# Patient Record
Sex: FEMALE | Race: WHITE | Employment: UNEMPLOYED | URBAN - METROPOLITAN AREA
[De-identification: names, ages, dates, MRNs, and addresses within clinical notes are randomized per-mention and may not be internally consistent; named-entity substitution may affect disease eponyms.]

---

## 2020-08-14 ENCOUNTER — ED HISTORICAL/CONVERTED ENCOUNTER (OUTPATIENT)
Dept: OTHER | Age: 30
End: 2020-08-14

## 2020-10-09 ENCOUNTER — HOSPITAL ENCOUNTER (INPATIENT)
Age: 30
LOS: 3 days | Discharge: HOME | DRG: 750 | End: 2020-10-13
Attending: EMERGENCY MEDICINE | Admitting: PSYCHIATRY & NEUROLOGY
Payer: MEDICAID

## 2020-10-09 DIAGNOSIS — F23 ACUTE PSYCHOSIS (HCC): Primary | ICD-10-CM

## 2020-10-09 LAB
ALBUMIN SERPL-MCNC: 4.3 G/DL (ref 3.5–5)
ALBUMIN/GLOB SERPL: 1.2 {RATIO} (ref 1.1–2.2)
ALP SERPL-CCNC: 86 U/L (ref 45–117)
ALT SERPL-CCNC: 23 U/L (ref 12–78)
AMPHET UR QL SCN: POSITIVE
ANION GAP SERPL CALC-SCNC: 20 MMOL/L (ref 5–15)
APAP SERPL-MCNC: 4 UG/ML (ref 10–30)
APPEARANCE UR: CLEAR
AST SERPL W P-5'-P-CCNC: 43 U/L (ref 15–37)
BACTERIA URNS QL MICRO: NEGATIVE /HPF
BARBITURATES UR QL SCN: NEGATIVE
BENZODIAZ UR QL: NEGATIVE
BILIRUB SERPL-MCNC: 1 MG/DL (ref 0.2–1)
BILIRUB UR QL: NEGATIVE
BUN SERPL-MCNC: 34 MG/DL (ref 6–20)
BUN/CREAT SERPL: 41 (ref 12–20)
CA-I BLD-MCNC: 9.4 MG/DL (ref 8.5–10.1)
CANNABINOIDS UR QL SCN: NEGATIVE
CHLORIDE SERPL-SCNC: 105 MMOL/L (ref 97–108)
CO2 SERPL-SCNC: 14 MMOL/L (ref 21–32)
COCAINE UR QL SCN: NEGATIVE
COLOR UR: ABNORMAL
CREAT SERPL-MCNC: 0.83 MG/DL (ref 0.55–1.02)
DATE LAST DOSE: ABNORMAL
DATE LAST DOSE: NORMAL
DRUG SCRN COMMENT,DRGCM: ABNORMAL
ETHANOL SERPL-MCNC: <10 MG/DL
GLOBULIN SER CALC-MCNC: 3.5 G/DL (ref 2–4)
GLUCOSE SERPL-MCNC: 88 MG/DL (ref 65–100)
GLUCOSE UR STRIP.AUTO-MCNC: NEGATIVE MG/DL
HCG SERPL QL: NEGATIVE
HGB UR QL STRIP: NEGATIVE
KETONES UR QL STRIP.AUTO: 80 MG/DL
LEUKOCYTE ESTERASE UR QL STRIP.AUTO: NEGATIVE
METHADONE UR QL: NEGATIVE
MUCOUS THREADS URNS QL MICRO: ABNORMAL /LPF
NITRITE UR QL STRIP.AUTO: NEGATIVE
OPIATES UR QL: NEGATIVE
PCP UR QL: NEGATIVE
PH UR STRIP: 5 [PH] (ref 5–8)
POTASSIUM SERPL-SCNC: 3.4 MMOL/L (ref 3.5–5.1)
PROT SERPL-MCNC: 7.8 G/DL (ref 6.4–8.2)
PROT UR STRIP-MCNC: 100 MG/DL
RBC #/AREA URNS HPF: ABNORMAL /HPF (ref 0–5)
REPORTED DOSE,DOSE: ABNORMAL UNITS
REPORTED DOSE,DOSE: NORMAL UNITS
SALICYLATES SERPL-MCNC: 3.7 MG/DL (ref 2.8–20)
SARS-COV-2, COV2: NORMAL
SARS-COV-2, COV2: NOT DETECTED
SODIUM SERPL-SCNC: 139 MMOL/L (ref 136–145)
SP GR UR REFRACTOMETRY: 1.02 (ref 1–1.03)
UA: UC IF INDICATED,UAUC: ABNORMAL
UROBILINOGEN UR QL STRIP.AUTO: 0.1 EU/DL (ref 0.1–1)
WBC URNS QL MICRO: ABNORMAL /HPF (ref 0–4)

## 2020-10-09 PROCEDURE — 81001 URINALYSIS AUTO W/SCOPE: CPT

## 2020-10-09 PROCEDURE — 80053 COMPREHEN METABOLIC PANEL: CPT

## 2020-10-09 PROCEDURE — 96372 THER/PROPH/DIAG INJ SC/IM: CPT

## 2020-10-09 PROCEDURE — 84703 CHORIONIC GONADOTROPIN ASSAY: CPT

## 2020-10-09 PROCEDURE — 74011250636 HC RX REV CODE- 250/636: Performed by: EMERGENCY MEDICINE

## 2020-10-09 PROCEDURE — 87635 SARS-COV-2 COVID-19 AMP PRB: CPT

## 2020-10-09 PROCEDURE — 80307 DRUG TEST PRSMV CHEM ANLYZR: CPT

## 2020-10-09 PROCEDURE — 99285 EMERGENCY DEPT VISIT HI MDM: CPT

## 2020-10-09 RX ORDER — BUPRENORPHINE HYDROCHLORIDE, NALOXONE HYDROCHLORIDE 8; 2 MG/1; MG/1
1 FILM, SOLUBLE BUCCAL; SUBLINGUAL DAILY
Status: ON HOLD | COMMUNITY
Start: 2020-10-04 | End: 2020-10-10

## 2020-10-09 RX ORDER — ALPRAZOLAM 1 MG/1
TABLET ORAL
Status: ON HOLD | COMMUNITY
Start: 2020-09-23 | End: 2020-10-10

## 2020-10-09 RX ORDER — ZIPRASIDONE MESYLATE 20 MG/ML
20 INJECTION, POWDER, LYOPHILIZED, FOR SOLUTION INTRAMUSCULAR
Status: COMPLETED | OUTPATIENT
Start: 2020-10-09 | End: 2020-10-09

## 2020-10-09 RX ADMIN — ZIPRASIDONE MESYLATE 20 MG: 20 INJECTION, POWDER, LYOPHILIZED, FOR SOLUTION INTRAMUSCULAR at 20:45

## 2020-10-10 PROBLEM — F25.9 SCHIZOAFFECTIVE DISORDER (HCC): Status: ACTIVE | Noted: 2020-10-10

## 2020-10-10 PROBLEM — F23 ACUTE PSYCHOSIS (HCC): Status: ACTIVE | Noted: 2020-10-10

## 2020-10-10 PROCEDURE — 74011250637 HC RX REV CODE- 250/637: Performed by: PSYCHIATRY & NEUROLOGY

## 2020-10-10 PROCEDURE — 65220000003 HC RM SEMIPRIVATE PSYCH

## 2020-10-10 RX ORDER — OLANZAPINE 5 MG/1
5 TABLET ORAL
Status: DISCONTINUED | OUTPATIENT
Start: 2020-10-10 | End: 2020-10-13 | Stop reason: HOSPADM

## 2020-10-10 RX ORDER — TRAZODONE HYDROCHLORIDE 50 MG/1
50 TABLET ORAL
Status: DISCONTINUED | OUTPATIENT
Start: 2020-10-10 | End: 2020-10-13 | Stop reason: HOSPADM

## 2020-10-10 RX ORDER — DIPHENHYDRAMINE HYDROCHLORIDE 50 MG/ML
50 INJECTION, SOLUTION INTRAMUSCULAR; INTRAVENOUS
Status: DISCONTINUED | OUTPATIENT
Start: 2020-10-10 | End: 2020-10-13 | Stop reason: HOSPADM

## 2020-10-10 RX ORDER — ACETAMINOPHEN 325 MG/1
650 TABLET ORAL
Status: DISCONTINUED | OUTPATIENT
Start: 2020-10-10 | End: 2020-10-13 | Stop reason: HOSPADM

## 2020-10-10 RX ORDER — LORAZEPAM 2 MG/ML
1 INJECTION INTRAMUSCULAR
Status: DISCONTINUED | OUTPATIENT
Start: 2020-10-10 | End: 2020-10-13 | Stop reason: HOSPADM

## 2020-10-10 RX ORDER — RISPERIDONE 1 MG/1
1 TABLET, FILM COATED ORAL 2 TIMES DAILY
Status: DISCONTINUED | OUTPATIENT
Start: 2020-10-10 | End: 2020-10-11

## 2020-10-10 RX ORDER — HALOPERIDOL 5 MG/ML
5 INJECTION INTRAMUSCULAR
Status: DISCONTINUED | OUTPATIENT
Start: 2020-10-10 | End: 2020-10-13 | Stop reason: HOSPADM

## 2020-10-10 RX ORDER — ADHESIVE BANDAGE
30 BANDAGE TOPICAL DAILY PRN
Status: DISCONTINUED | OUTPATIENT
Start: 2020-10-10 | End: 2020-10-13 | Stop reason: HOSPADM

## 2020-10-10 RX ORDER — BENZTROPINE MESYLATE 1 MG/1
1 TABLET ORAL
Status: DISCONTINUED | OUTPATIENT
Start: 2020-10-10 | End: 2020-10-13 | Stop reason: HOSPADM

## 2020-10-10 RX ADMIN — RISPERIDONE 1 MG: 1 TABLET ORAL at 22:22

## 2020-10-10 RX ADMIN — RISPERIDONE 1 MG: 1 TABLET ORAL at 17:14

## 2020-10-10 NOTE — ED PROVIDER NOTES
EMERGENCY DEPARTMENT HISTORY AND PHYSICAL EXAM      Date: 10/9/2020  Patient Name: Joni Sen    History of Presenting Illness     Chief Complaint   Patient presents with    Mental Health Problem       History Provided By: EMS and Law Enforcement    HPI: Joni Sen, 26 y.o. female with a past medical history significant Prior psychiatric history presents to the ED with cc of patient was brought in by EMS after police were alerted that Pt found by Theresa SOTO/ digging through trash after reports of neighbors call for Pt digging through neighbors trash. PD report Pt stated was given handful of unknown substance/pills prior to episode. PD report Pt has Hx of BH issues    There are no other complaints, changes, or physical findings at this time. PCP: None    Current Outpatient Medications   Medication Sig Dispense Refill    ALPRAZolam (XANAX) 1 mg tablet       Suboxone 8-2 mg film sublingaul film 1 Film by SubLINGual route daily. 1/2 film PRN for cravings         Past History     Past Medical History:  No past medical history on file. Past Surgical History:  No past surgical history on file. Family History:  No family history on file. Social History:  Social History     Tobacco Use    Smoking status: Unknown If Ever Smoked   Substance Use Topics    Alcohol use: Not on file    Drug use: Not on file       Allergies:  No Known Allergies      Review of Systems     Review of Systems   Unable to perform ROS: Psychiatric disorder   Psychiatric/Behavioral: Positive for agitation, behavioral problems and confusion. The patient is nervous/anxious. Physical Exam     Physical Exam  Constitutional:       General: She is in acute distress. Appearance: She is toxic-appearing. She is not ill-appearing. HENT:      Head: Normocephalic and atraumatic.       Nose: Nose normal.      Mouth/Throat:      Mouth: Mucous membranes are moist.   Eyes:      Extraocular Movements: Extraocular movements intact. Conjunctiva/sclera: Conjunctivae normal.      Pupils: Pupils are equal, round, and reactive to light. Neck:      Musculoskeletal: Neck supple. Cardiovascular:      Rate and Rhythm: Normal rate and regular rhythm. Pulses: Normal pulses. Pulmonary:      Effort: Pulmonary effort is normal.      Breath sounds: Normal breath sounds. Abdominal:      General: Abdomen is flat. Palpations: Abdomen is soft. Tenderness: There is no abdominal tenderness. Musculoskeletal: Normal range of motion. General: No swelling or deformity. Skin:     General: Skin is warm and dry. Neurological:      General: No focal deficit present. Mental Status: She is oriented to person, place, and time.    Psychiatric:      Comments: Is extremely combative and agitated         Diagnostic Study Results     Labs -     Recent Results (from the past 12 hour(s))   URINALYSIS W/ REFLEX CULTURE    Collection Time: 10/09/20  9:15 PM    Specimen: Urine   Result Value Ref Range    Color Yellow/Straw      Appearance Clear Clear      Specific gravity 1.025 1.003 - 1.030      pH (UA) 5.0 5.0 - 8.0      Protein 100 (A) Negative mg/dL    Glucose Negative Negative mg/dL    Ketone 80 (A) Negative mg/dL    Bilirubin Negative Negative      Blood Negative Negative      Urobilinogen 0.1 0.1 - 1.0 EU/dL    Nitrites Negative Negative      Leukocyte Esterase Negative Negative      UA:UC IF INDICATED Culture not indicated by UA result Culture not indicated by UA result      WBC 5-10 0 - 4 /hpf    RBC 0-5 0 - 5 /hpf    Bacteria Negative Negative /hpf    Mucus Trace /lpf   HCG QL SERUM    Collection Time: 10/09/20  9:15 PM   Result Value Ref Range    HCG, Ql. Negative Negative     METABOLIC PANEL, COMPREHENSIVE    Collection Time: 10/09/20  9:15 PM   Result Value Ref Range    Sodium 139 136 - 145 mmol/L    Potassium 3.4 (L) 3.5 - 5.1 mmol/L    Chloride 105 97 - 108 mmol/L    CO2 14 (LL) 21 - 32 mmol/L    Anion gap 20 (H) 5 - 15 mmol/L    Glucose 88 65 - 100 mg/dL    BUN 34 (H) 6 - 20 mg/dL    Creatinine 0.83 0.55 - 1.02 mg/dL    BUN/Creatinine ratio 41 (H) 12 - 20      GFR est AA >60 >60 ml/min/1.73m2    GFR est non-AA >60 >60 ml/min/1.73m2    Calcium 9.4 8.5 - 10.1 mg/dL    Bilirubin, total 1.0 0.2 - 1.0 mg/dL    AST (SGOT) 43 (H) 15 - 37 U/L    ALT (SGPT) 23 12 - 78 U/L    Alk. phosphatase 86 45 - 117 U/L    Protein, total 7.8 6.4 - 8.2 g/dL    Albumin 4.3 3.5 - 5.0 g/dL    Globulin 3.5 2.0 - 4.0 g/dL    A-G Ratio 1.2 1.1 - 2.2     DRUG SCREEN, URINE    Collection Time: 10/09/20  9:15 PM   Result Value Ref Range    AMPHETAMINES Positive (A) Negative      BARBITURATES Negative Negative      BENZODIAZEPINES Negative Negative      COCAINE Negative Negative      METHADONE Negative Negative      OPIATES Negative Negative      PCP(PHENCYCLIDINE) Negative Negative      THC (TH-CANNABINOL) Negative Negative      Drug screen comment        This test is a screen for drugs of abuse in a medical setting only (i.e., they are unconfirmed results and as such must not be used for non-medical purposes, e.g.,employment testing, legal testing). Due to its inherent nature, false positive (FP) and false negative (FN) results may be obtained. Therefore, if necessary for medical care, recommend confirmation of positive findings by GC/MS.    ACETAMINOPHEN    Collection Time: 10/09/20  9:15 PM   Result Value Ref Range    Acetaminophen level 4 (L) 10 - 30 ug/mL    Reported dose date Not provided      Reported dose: Not provided Units   SALICYLATE    Collection Time: 10/09/20  9:15 PM   Result Value Ref Range    Salicylate level 3.7 2.8 - 20.0 mg/dL    Reported dose date Not provided      Reported dose: Not provided Units   SARS-COV-2    Collection Time: 10/09/20  9:15 PM   Result Value Ref Range    SARS-CoV-2 Please find results under separate order     ETHYL ALCOHOL    Collection Time: 10/09/20  9:15 PM   Result Value Ref Range    ALCOHOL(ETHYL),SERUM <10 <10 mg/dL   SARS-COV-2    Collection Time: 10/09/20  9:15 PM   Result Value Ref Range    SARS-CoV-2 Not Detected Not Detected         Radiologic Studies -   XR Results (most recent):  No results found for this or any previous visit. CT Results  (Last 48 hours)    None        CXR Results  (Last 48 hours)    None          Medical Decision Making and ED Course   I am the first provider for this patient. Provider Notes:   Patient presents with acute suicidal ideation. DDx:  2/2 MDD, schizoaffective d/o, bipolar, drug induced, organic cause such as electrolyte anomoly or infection. Stable vitals and benign exam. No obvious organic causes to explain behavior but will obtain psych labs, UA, UDS and speak with mental health professional about possible admission. Pt is currently voluntary. Sitter at bedside. Will continue to monitor while in ED. I reviewed the vital signs, available nursing notes, past medical history, past surgical history, family history and social history. Vital Signs-Reviewed the patient's vital signs. Patient Vitals for the past 12 hrs:   Temp Pulse Resp BP SpO2   10/10/20 0604 98 °F (36.7 °C) 89 16 122/73    10/09/20 2050 98.8 °F (37.1 °C) (!) 134 24 (!) 142/80 99 %         Records Reviewed: Nursing Notes and Ambulance Run Sheet    ED Course:   Initial assessment performed. The patients presenting problems have been discussed, and they are in agreement with the care plan formulated and outlined with them. I have encouraged them to ask questions as they arise throughout their visit. Disposition     Admit to psychiatry      Diagnosis     Clinical Impression:   Encounter Diagnoses     ICD-10-CM ICD-9-CM   1. Acute psychosis [facility])  F23 298.9        Attestations:    Wolf Mon MD    Please note that this dictation was completed with [employer], the [employer] voice recognition software.   Quite often unanticipated grammatical, syntax, homophones, and other interpretive errors are inadvertently transcribed by the computer software. Please disregard these errors. Please excuse any errors that have escaped final proofreading. Thank you.

## 2020-10-10 NOTE — SUICIDE SAFETY PLAN
SAFETY PLAN    A suicide Safety Plan is a document that supports someone when they are having thoughts of suicide. Warning Signs that indicate a suicidal crisis may be developing: What (situations, thoughts, feelings, body sensations, behaviors, etc.) do you experience that lets you know you are beginning to think about suicide? 1.refused to answer        Internal Coping Strategies:  What things can I do (relaxation techniques, hobbies, physical activities, etc.) to take my mind off my problems without contacting another person? 1. Refused to answer        People and social settings that provide distraction: Who can I call or where can I go to distract me? 1. Name: refused to answer Phone: n/a    People whom I can ask for help: Who can I call when I need help - for example, friends, family, clergy, someone else? 1. Name: refused to answer  Phone: n/a    Professionals or [address] [place] [address] agencies I can contact during a crisis: Who can I call for help - for example, my doctor, my psychiatrist, my psychologist, a mental health provider, a suicide hotline? 1. Clinician Name:n/a  Phone: n/a      Clinician Pager or Emergency Contact  n/a  2. Suicide Prevention Lifeline: 3-100-947-TALK (7240)    4. [address] [place] [address] Emergency Services -  for example, [facility] suicide hotline, [address] [place] [address] Avenue: [phone]      Emergency Services Address: [address] [place] [address]      Emergency Services Phone: n/a    Making the environment safe: How can I make my environment (house/apartment/living space) safer? For example, can I remove guns, medications, and other items? 1.  Refused to answer

## 2020-10-10 NOTE — BSMART NOTE
Pt assessed face to face in ED Trauma 2. Pt is 26year old white female presenting to ED under paperless ECO with complaints of unspecified psychosis. Pt presents laying in hospital bed in green gown, filthy appearance, poor smell, unkempt and disheveled, intense eye contact, drowsy, alert only to self but not time place or situation, mumbled speech. Pt presents with labile affect, anxious mood, illogical thought process, impaired thought content, no insight/judgement. Pt unable to participate in assessment due to current mental state. Pt mumbling, word salad, unable to understand. Per [place] PD Juan J Christine who picked up pt, apparently PD called due to pt digging through her neighbor's trash can. When PD arrived on scene at pt's house, pt was on the porch yelling and screaming for everyone to leave her property. Pd reports that it was only pt and her father at the house. Pt also asked PD to speak with her 2 brothers who pt stated were in her bedroom. When PD went to speak to them, nobody there, per father, bother her brothers are away at [employer] for school. Per PD, apparently pt has psych hx but were unable to give further information. Upon arrival to ED, pt uncooperative, yelling and screaming, combative so was put into 2 point restraints and given Geodon. Pt unable to have coherent conversation with writer. Pt assessed by Natalie Lancaster from [place] [address] who reports pt is to be a TDO. Dr. Dmitriy Vail notified, reports that pt will be accepted to  pending medical clearance and negative COVID test.  ED RN Pat Joseph and Dr. Martinez Been notified.

## 2020-10-10 NOTE — GROUP NOTE
PIY[facility]  GROUP DOCUMENTATION INDIVIDUAL Group Therapy Note Date: 10/10/2020 Group Start Time: 1300 Group End Time: 7994 Group Topic: Process Group - Inpatient [facility] Aj Small IP  GROUP DOCUMENTATION GROUP Group Therapy Note The therapist facilitated a group by allowing the patient's to share their thoughts and feelings Attendees:  
 
  
 
Attendance: The nursing staff asked that the therapist not invite the patient to group. She had just arrived on the unit, and was very agitated while in the ED. She had been sleeping when group occurred. Patient's Goal: Interventions/techniques: Follows Directions:  
 
Interactions:  
 
Mental Status:  
 
Behavior/appearance:  
 
Goals Achieved: Additional Notes:   
 
Karthik Harper

## 2020-10-10 NOTE — PROGRESS NOTES
Problem: Psychosis  Goal: *STG: Decreased hallucinations  Outcome: Progressing Towards Goal     Problem: Psychosis  Goal: *STG: Remains safe in hospital  Outcome: Progressing Towards Goal      Problem: Psychosis  Goal: *STG/LTG: Demonstrates improved thought patterns as evidenced by logical and coherent speech  Outcome: Progressing Towards Goal   Pt has spent most of shift in bed resting with eyes closed, respirations even and unlabored. Pt continues to denied a/v hallucinations, without noted behaviors. Pt denied SI/HI. Pt without aggressive behaviors. Pt has accepted and tolerated meds in her room, she did not want to go into the dining room with peers today. Pt received several calls from family members who did not have her passcode. Pt informed of calls and advised her to contact them with her passcode to accept calls. Pt was provided with written numbers for family members, when handed to her she threw the paper on the bed. Pt remains unkempt and disheveled, again encouraged to completed hygienic needs as personal hygiene items have been provided to pt. Will continue to monitor pt on close observation and follow treatment plan. Pt has several black and brown bruises over her hips, legs and arms, pt stated she did not know how she got them. No open wounds.

## 2020-10-10 NOTE — ROUTINE PROCESS
TRANSFER - OUT REPORT: 
 
Verbal report given to Ranjana Mendez on Quan Sam  being transferred to  for routine progression of care Report consisted of patients Situation, Background, Assessment and  
Recommendations(SBAR). Information from the following report(s) ED Summary was reviewed with the receiving nurse. Lines:    
 
Opportunity for questions and clarification was provided. Patient trans ported with:[place] [employer] Tech

## 2020-10-10 NOTE — H&P
[facility] HISTORY AND PHYSICAL    Name:  Azra Macias  MR#:  [id number]  :  1990  ACCOUNT #:  [de-identified]  ADMIT DATE:  10/09/2020    INITIAL PSYCHIATRIC ASSESSMENT    HISTORY OF PRESENT ILLNESS:  The patient is a 79-year-old  female, who was admitted to the behavioral health floor under TDO after the patient presented with a paperless ECO. The patient presented with psychosis unspecified at the time of initial presentation to the ED. The patient is a poor historian and information is thus obtained from talking with behavioral health intake staff, review of electronic records, and talking to the patient as well. As per the information available, the patient presented initially to the emergency department unkempt, disheveled, oriented to self, disorganized thought process, anxious, labile with poor insight and judgment. The patient continues to stare, unable to provide information or express her feelings. The patient noted mumbling and difficult to understand. As per the report available and per [place] [employer], who picked the patient, they had noted that the patient was digging through her neighbor's trash can. When police had arrived at the scene, the patient was reported on the porch, yelling and screaming for everyone to leave her property. The patient also had wanted the police officers to talk to her brothers and as per the information from her father, both her brothers are at [employer] at school. The patient further had difficulty providing further information. The patient in the emergency department on arrival was noted to be yelling, screaming, actively responding, combative, had to be placed on two-point restraints and was given Geodon. The patient TDO'ed to the behavioral health floor. PAST PSYCHIATRIC HISTORY:  Information is unavailable at this time. Unable to obtain.     Trauma history, family, medical and personal history unobtainable at this time. MENTAL STATUS EXAMINATION:  The patient is alert, oriented to name. Unable to assess her orientation to place, time. Actively responding to external and internal stimuli. Disorganized, disheveled. Poor insight, judgment. Unable to express herself, loose associations. ADMITTING DIAGNOSES:  1.  Schizoaffective disorder. 2.  Psychosis, unspecified. 3.  Rule out substance-induced mood disorder. Positive for amphetamines. The patient to be placed on close observation. We will start her on Risperdal 1 mg p.o. b.i.d. and place her on p.r.n. medications as needed for acute psychosis, anxiety, agitation as per the physician orders. The patient will continue to engage in individual therapy, group therapy, and support group as she continues to progress. We will continue to obtain further collateral.    We will engage in family meetings to continue to address her treatment planning and discharge planning. Place on close observation. We will obtain previous treatment records. LENGTH OF STAY:  Five to seven days. STRENGTHS:  Seems overall healthy and has support from father. WEAKNESS:  Poor insight. DISCHARGE CRITERIA:  The patient shows improvement in her psychotic symptoms and continues to show improvement and gain insight into her current illness and situations leading to her current situation and has healthy ways to cope and shows improved insight. Medical consult to be obtained.       Leah Becerra MD      [id number]  D:  10/10/2020 14:50  T:  10/10/2020 17:25  JOB #:  [id number]

## 2020-10-10 NOTE — BSMART NOTE
Request from [facility] Hiro Ruiz to call [facility] at [phone] to inform them if pt is COVID positive as Hiro Ruiz was dealing with pt without a mask on when he first made contact.

## 2020-10-10 NOTE — BH NOTES
Writer spoke with pt's sister Geoff Sinclair ([phone]) who reported that her sister has had one prior TDO and that she is currently in a suboxone program and she wanted to relay the information to the on-call psychiatrist. Tamara Larkin informed Dr. Hernandez Lennon.

## 2020-10-10 NOTE — ED TRIAGE NOTES
Pt found by Theresa SOTO/ digging through trash after reports of neighbors call for Pt digging through neighbors trash. PD report Pt stated was given handful of unknown substance/pills prior to episode. PD report Pt has Hx of BH response.

## 2020-10-11 PROCEDURE — 65220000003 HC RM SEMIPRIVATE PSYCH

## 2020-10-11 PROCEDURE — 74011250637 HC RX REV CODE- 250/637: Performed by: EMERGENCY MEDICINE

## 2020-10-11 PROCEDURE — 74011250637 HC RX REV CODE- 250/637: Performed by: PSYCHIATRY & NEUROLOGY

## 2020-10-11 RX ORDER — HYDROXYZINE PAMOATE 50 MG/1
50 CAPSULE ORAL
Status: DISCONTINUED | OUTPATIENT
Start: 2020-10-11 | End: 2020-10-13 | Stop reason: HOSPADM

## 2020-10-11 RX ORDER — CITALOPRAM 10 MG/1
10 TABLET ORAL DAILY
Status: DISCONTINUED | OUTPATIENT
Start: 2020-10-11 | End: 2020-10-13 | Stop reason: HOSPADM

## 2020-10-11 RX ORDER — IBUPROFEN 200 MG
1 TABLET ORAL DAILY
Status: DISCONTINUED | OUTPATIENT
Start: 2020-10-11 | End: 2020-10-13 | Stop reason: HOSPADM

## 2020-10-11 RX ORDER — RISPERIDONE 2 MG/1
2 TABLET, FILM COATED ORAL 2 TIMES DAILY
Status: DISCONTINUED | OUTPATIENT
Start: 2020-10-11 | End: 2020-10-13 | Stop reason: HOSPADM

## 2020-10-11 RX ADMIN — HYDROXYZINE PAMOATE 50 MG: 50 CAPSULE ORAL at 21:00

## 2020-10-11 RX ADMIN — RISPERIDONE 1 MG: 1 TABLET ORAL at 08:09

## 2020-10-11 RX ADMIN — RISPERIDONE 2 MG: 2 TABLET ORAL at 20:59

## 2020-10-11 RX ADMIN — CITALOPRAM HYDROBROMIDE 10 MG: 10 TABLET ORAL at 11:07

## 2020-10-11 RX ADMIN — TRAZODONE HYDROCHLORIDE 50 MG: 50 TABLET ORAL at 21:00

## 2020-10-11 RX ADMIN — OLANZAPINE 5 MG: 5 TABLET, FILM COATED ORAL at 15:23

## 2020-10-11 RX ADMIN — ACETAMINOPHEN 650 MG: 325 TABLET, FILM COATED ORAL at 11:24

## 2020-10-11 RX ADMIN — HYDROXYZINE PAMOATE 50 MG: 50 CAPSULE ORAL at 11:31

## 2020-10-11 NOTE — GROUP NOTE
IP  GROUP DOCUMENTATION INDIVIDUAL Group Therapy Note Date: 10/11/2020 Group Start Time: 2390 Group End Time: 1000 Group Topic: [employer] [facility] 2 BH NON ACUTE Екатерина Bepatricke [facility] GROUP DOCUMENTATION GROUP Group Therapy Note Attendees: 
 
  
 
Attendance:  
 
Patient's Goal: Interventions/techniques: Follows Directions:  
 
Interactions:  
 
Mental Status:  
 
Behavior/appearance:  
 
Goals Achieved: Additional Notes:  
 
Jania Camilo

## 2020-10-11 NOTE — BH NOTES
RELEASE OF INFORMATION     The patient gave the therapist consent to speak with her father Oc Dial ([phone]).

## 2020-10-11 NOTE — BH NOTES
Pt is laying on bed with eyes closed. Pt slept for the entire shift. Slept thru the shift. Woke up this morning and was calm and went back to bed. No outburst behavior noted. Pt is psychotic but is not aggressive at this moment. No violent behaviors noted. Mood is angry. Affect hostile. Insight and judgement poor. Pt thought process is disorganized. Vitals stable.  Pt continue to be on close observation via 15 mins safety checks

## 2020-10-11 NOTE — CONSULTS
Consult    Subjective:     Patient is a 26y.o. year old female no past medical history except dependence on amphetamines came to emergency room and admitted to psychiatric floor because of psychosis anxiety depression aggression    Patient denies any chest pain or shortness of breath nausea vomiting diarrhea no constipation    Past Medical History:   Diagnosis Date    Aggressive outburst     Anxiety disorder       History reviewed. No pertinent surgical history. History reviewed. No pertinent family history.    Social History     Tobacco Use    Smoking status: Former Smoker     Types: Cigarettes    Smokeless tobacco: Never Used    Tobacco comment: pt refused   Substance Use Topics    Alcohol use: Not Currently       Current Facility-Administered Medications   Medication Dose Route Frequency Provider Last Rate Last Dose    risperiDONE (RisperDAL) tablet 2 mg  2 mg Oral BID Erika Cain MD        citalopram (CELEXA) tablet 10 mg  10 mg Oral DAILY Flores Cain MD   10 mg at 10/11/20 1107    nicotine (NICODERM CQ) 21 mg/24 hr patch 1 Patch  1 Patch TransDERmal DAILY Erika Cain MD   1 Patch at 10/11/20 1107    hydrOXYzine pamoate (VISTARIL) capsule 50 mg  50 mg Oral TID PRN María Gomes MD   50 mg at 10/11/20 1131    acetaminophen (TYLENOL) tablet 650 mg  650 mg Oral Q6H PRN Michelle Reed MD   650 mg at 10/11/20 1124    OLANZapine (ZyPREXA) tablet 5 mg  5 mg Oral Q6H PRN María Gomes MD   5 mg at 10/11/20 1523    haloperidol lactate (HALDOL) injection 5 mg  5 mg IntraMUSCular Q6H PRN María Gomes MD        benztropine (COGENTIN) tablet 1 mg  1 mg Oral BID PRN María Gomes MD        diphenhydrAMINE (BENADRYL) injection 50 mg  50 mg IntraMUSCular BID PRN María Gomes MD        LORazepam (ATIVAN) injection 1 mg  1 mg IntraMUSCular Q4H PRN María Gomes MD        traZODone (DESYREL) tablet 50 mg  50 mg Oral QHS PRN María Gomes MD        magnesium hydroxide (MILK OF MAGNESIA) 400 mg/5 mL oral suspension 30 mL  30 mL Oral DAILY PRN Angelica Marlow MD            No Known Allergies     Review of Systems:  Constitutional: Negative for chills and fever. HENT: Negative. Eyes: Negative. Respiratory: Negative. Cardiovascular: Negative. Gastrointestinal: Negative for abdominal pain and nausea. Skin: Negative. Neurological: Negative. Objective: Intake and Output:    No intake/output data recorded. No intake/output data recorded. Physical Exam:   Constitutional: pt is oriented to person, place, and time. HENT:   Head: Normocephalic and atraumatic. Eyes: Pupils are equal, round, and reactive to light. EOM are normal.   Cardiovascular: Normal rate, regular rhythm and normal heart sounds. Pulmonary/Chest: Breath sounds normal. No wheezes. No rales. Exhibits no tenderness. Abdominal: Soft. Bowel sounds are normal. There is no abdominal tenderness. There is no rebound and no guarding. Musculoskeletal: Normal range of motion. Neurological: pt is alert and oriented to person, place, and time. Alert. Normal strength. No cranial nerve deficit or sensory deficit. Displays a negative Romberg sign. Data Review:   No results found for this or any previous visit (from the past 24 hour(s)).     No orders to display        Assessment:     Active Problems:    Acute psychosis ([facility] [address] (10/10/2020)      Schizoaffective disorder ([facility] [address] (10/10/2020)          Plan:   Continue current medication as prescribed by the psychiatrist   repeat CBC BMP today

## 2020-10-11 NOTE — GROUP NOTE
[facility]  GROUP DOCUMENTATION INDIVIDUAL Group Therapy Note Date: 10/11/2020 Group Start Time: 1 Group End Time: 3102 Group Topic: Nursing [facility] ACUTE Jessie Obando LPN IP  GROUP DOCUMENTATION GROUP Group Therapy Note Attendees:  
 
 
  
 
Attendance: Did not attend Patient's Goal 
 
Interventions/techniques: Follows Directions:  
 
Interactions:  
 
Mental Status:  
 
Behavior/appearance:  
 
Goals Achieved:   
 
 
Additional Notes:  
 
Jose Angel Mckinney LPN

## 2020-10-11 NOTE — PROGRESS NOTES
Problem: Psychosis  Goal: *STG: Remains safe in hospital  Outcome: Progressing Towards Goal     Problem: Psychosis  Goal: *STG/LTG: Complies with medication therapy  Outcome: Progressing Towards Goal   Patient has remained in her room thus far today. Eats her meals in her room. Stated that she is a vegetarian & does not eat meat. Diet changed from regular to vegetarian. Medication compliant. No side effects noted. Stated, \"I need a cigarette, can't I go outside & smoke a cigarette. \" Nicoderm  Patch 21mg ordered & applied topically to left upper arm. Patient requested & received vistaril 50 mg po at [phone] for complaints of increase anxiety. Patient stated that she takes suboxone. According to [address] on [facility] in [place], patient picked up suboxone 8-2mg 1 film BID & half a film prn for cravings. 70 films were dispensed on 10/04/2020. Prescribed by Dr. Jessie Chua. Dr. Eduardo Lutz was made aware. Patient verbalized understanding of scheduled hearing to be held tomorrow. Stated that she has been under a TDO before in the past. Patient's mood is anxious. Thoughts are disorganized at times. Hygiene remains poor. Not attending to ADL's. Denies SI/HI. Denies AVH. No physical complaints voiced. Remains on CO. Continue to monitor patient closely. Stated that she did not sleep well last night due to having frequent awakenings. Presently lying in bed awake. Stated that she is worried about her hearing tomorrow.
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
## 2020-10-11 NOTE — GROUP NOTE
PIY[facility]  GROUP DOCUMENTATION INDIVIDUAL Group Therapy Note Date: 10/11/2020 Group Start Time: 2773 Group End Time: 1400 Group Topic: Process Group - Inpatient [facility] Yajaira Andrade [facility] GROUP DOCUMENTATION GROUP Group Therapy Note The therapist facilitated a group by encouraging the patient's to share their thoughts and feelings. Attendees:  
 
  
 
Attendance: Did not attend Patient's Goal: Interventions/techniques: Follows Directions:  
 
Interactions:  
 
Mental Status:  
 
Behavior/appearance:  
 
Goals Achieved: Additional Notes:  *** Mariana Romero

## 2020-10-11 NOTE — BH NOTES
[place]: Ms. Lee Ann Lewis  was admitted to the behavioral health floor under TDO. Pt presented with psychosis. Pt was seen during morning rounds and was withdrawn, continues to stare, unable to provide much information. Pt did give consent to reach out to her family.     MSE:  The patient is alert, oriented to name. Unable to assess her orientation to place, time. Not oriented to date (*When asked what month we are in pt replied September)  Actively responding to external and internal stimuli. Disorganized, disheveled. Poor insight, judgment. Unable to express herself, loose associations.     Assessment   1. Schizoaffective disorder. 2.  Psychosis, unspecified. 3.  Rule out substance-induced mood disorder. Positive for amphetamines.     Plan:  1. Patient on close observation  2. p.r.n. medications avalible for acute psychosis, anxiety, agitation   3.  Pt will continue to be encouraged to engage in individual therapy, group therapy, and support group as she continues to progress.   4. We will continue to obtain further collateral.   5.  We will obtain previous treatment records      Current Facility-Administered Medications:     risperiDONE (RisperDAL) tablet 2 mg, 2 mg, Oral, BID, Flores Cain MD    citalopram (CELEXA) tablet 10 mg, 10 mg, Oral, DAILY, Flores Cain MD, 10 mg at 10/11/20 1107    nicotine (NICODERM CQ) 21 mg/24 hr patch 1 Patch, 1 Patch, TransDERmal, DAILY, Flores Cain MD, 1 Patch at 10/11/20 1107    hydrOXYzine pamoate (VISTARIL) capsule 50 mg, 50 mg, Oral, TID PRN, Bridgette Palomino MD, 50 mg at 10/11/20 1131    acetaminophen (TYLENOL) tablet 650 mg, 650 mg, Oral, Q6H PRN, Elroy Orosco MD, 650 mg at 10/11/20 1124    OLANZapine (ZyPREXA) tablet 5 mg, 5 mg, Oral, Q6H PRN, Bridgette Palomino MD    haloperidol lactate (HALDOL) injection 5 mg, 5 mg, IntraMUSCular, Q6H PRN, Flores Cain MD    benztropine (COGENTIN) tablet 1 mg, 1 mg, Oral, BID PRN, Bridgette Palomino MD    diphenhydrAMINE (BENADRYL) injection 50 mg, 50 mg, IntraMUSCular, BID PRN, Flores Cain MD    LORazepam (ATIVAN) injection 1 mg, 1 mg, IntraMUSCular, Q4H PRN, Flores Cain MD    traZODone (DESYREL) tablet 50 mg, 50 mg, Oral, QHS PRN, Flores Cain MD    magnesium hydroxide (MILK OF MAGNESIA) 400 mg/5 mL oral suspension 30 mL, 30 mL, Oral, DAILY PRN, Donya Love MD

## 2020-10-11 NOTE — BH NOTES
[facility] Recreational Therapy Assessment    Orientation:  Person and Place    Reason for Admission:  Pt reported \"she don't know why she was admitted and she just want to go home\" Pt declined being depressed, having suicidal thoughts, hearing voices, seeing things or using alcohol or drugs\" According to chart pt was seen going thru the trash screaming and yelling as if she was talking to to someone. Chart indicate pt was presenting A/V Hallucinations. Pt was admitted under a TDO. Medical Precautions / Conditions:  None    Impairments:     Vision:  Wears Glasses No      Wears Contacts No      Are they with Patient No     Hearing Aids No     Utilization of Ambulatory Devices:  None    Health Problems Preventing Participation in Activities:  No    Leisure Interest Checklist:  Listening to Music, Reading, TV / Movies, Visiting with Others and Walking    Does patient participate in leisure activities:  Yes    Setting:  Alone, With Family and With Friends    Do emotions interfere with leisure activities / lifestyles:  No    When engaging in leisure activities, do you forget worries:  Yes    Do you belong to a Yazidism:  No    Are you active in Garcias activities:  No    Typical Day:  Pt reported \"she go outside and do different stuff\"    Strengths:  Pt reported \"her family\"    Limitations / Barriers:  Pt reported \"being here in the hospital\"    Treatment Modalities:  Stress Management, Coping Skills, Symptom Recognition, Healthy Thinking, Mood Management and Leisure Skills    Patient Educational  Needs:  Skills to recognize and challenge problematic thinking, Identify positive coping strategies and skills to manage symptoms or moods, Leisure education and Recognition of symptoms and signs    Focus of Treatment:  Introduce positive outlets for self expression and Introduce and encourage the exploration of alternative coping skills    Summary:  Pt was cooperative during assessment.  Pt reported she live with her parents and was not working.  Pt reported \"she didn't know why she was admitted and she wanted to go home\"

## 2020-10-11 NOTE — GROUP NOTE
[facility]  GROUP DOCUMENTATION INDIVIDUAL Group Therapy Note Date: 10/11/2020 Group Start Time: 8252 Group End Time: 1400 Group Topic: Process Group - Inpatient [facility] Kameron PICKETT  GROUP DOCUMENTATION GROUP Group Therapy Note The therapist facilitated a group by encouraging the patient's to share their thoughts and feelings. Attendees:  
 
  
 
Attendance: Did not attend Patient's Goal: Interventions/techniques: Follows Directions:  
 
Interactions:  
 
Mental Status:  
 
Behavior/appearance:  
 
Goals Achieved: Additional Notes:   
 
Brie Krueger

## 2020-10-12 PROCEDURE — 65220000003 HC RM SEMIPRIVATE PSYCH

## 2020-10-12 PROCEDURE — 74011250637 HC RX REV CODE- 250/637: Performed by: EMERGENCY MEDICINE

## 2020-10-12 PROCEDURE — 74011250637 HC RX REV CODE- 250/637: Performed by: PSYCHIATRY & NEUROLOGY

## 2020-10-12 RX ADMIN — TRAZODONE HYDROCHLORIDE 50 MG: 50 TABLET ORAL at 20:14

## 2020-10-12 RX ADMIN — ACETAMINOPHEN 650 MG: 325 TABLET, FILM COATED ORAL at 08:14

## 2020-10-12 RX ADMIN — RISPERIDONE 2 MG: 2 TABLET ORAL at 20:14

## 2020-10-12 RX ADMIN — RISPERIDONE 2 MG: 2 TABLET ORAL at 08:10

## 2020-10-12 RX ADMIN — HYDROXYZINE PAMOATE 50 MG: 50 CAPSULE ORAL at 08:14

## 2020-10-12 RX ADMIN — OLANZAPINE 5 MG: 5 TABLET, FILM COATED ORAL at 12:44

## 2020-10-12 RX ADMIN — CITALOPRAM HYDROBROMIDE 10 MG: 10 TABLET ORAL at 08:10

## 2020-10-12 NOTE — PROGRESS NOTES
Comprehensive Nutrition Assessment    Type and Reason for Visit: Initial    Nutrition Recommendations/Plan:   Continue Vegetarian diet, Double Portions     If PO intakes <50% trays, add Ensure Enlive TID    RN, please document all PO intakes in EMR    Nutrition Assessment:  Pt brought in under TDO, required 2 pt restraints in ED. Now more calm, however not interacting with peers and with poor hygiene. Per EMR, appetite and intakes fairly good >50% on double portions on vegetarian diet. Labs and meds reviewed. Malnutrition Assessment:  Malnutrition Status:  Insufficient data      Nutrition Related Findings:  Unable to perform  NFPE at this time. Noted wt not measured; possibly in error. RD called unit to request updated scale wt as able. No documented dysphagia, n/v, or c/d. No edema.       Wounds:    None       Current Nutrition Therapies:  DIET REGULAR Vegetarian, Double Portions    Anthropometric Measures:  · Height:  5' 4\" (162.6 cm)  · Current Body Wt:  45.4 kg (100 lb 1.4 oz)(stated vs est)    · Ideal Body Wt:  120 lbs:  83.4 %   · BMI Category:  Underweight (BMI less than 18.5)         Nutrition Diagnosis:   · Underweight related to (suspect inadequate protein-energy intake) as evidenced by BMI    Nutrition Interventions:   Food and/or Nutrient Delivery: Continue current diet  Nutrition Education and Counseling: No recommendations at this time  Coordination of Nutrition Care: Continued inpatient monitoring    Goals:  Meet >75% EENs in 7 days via PO  Wt gain 1lb +/- 0.5lb per week       Nutrition Monitoring and Evaluation:   Behavioral-Environmental Outcomes:  N/A  Food/Nutrient Intake Outcomes: Food and nutrient intake  Physical Signs/Symptoms Outcomes: Weight    Discharge Planning:    No discharge needs at this time     Electronically signed by Roz Esquivel on 10/12/2020 at 11:47 AM    Contact:

## 2020-10-12 NOTE — BH NOTES
Client is pleasant and cooperative. Alert and oriented x 3. Appearance is untidy and hair is uncombed. She takes meds as prescribed and denies any side effects. She admits feeling a little shaky but the meds are helping her to feel better. No interaction noted between peers. Remains on close observation for safety.

## 2020-10-12 NOTE — GROUP NOTE
[facility]  GROUP DOCUMENTATION INDIVIDUAL Group Therapy Note Date: 10/12/2020 Group Start Time: 1400 Group End Time: 1500 Group Topic: AA/NA 
 
[facility] Pedro PICKETT  GROUP DOCUMENTATION GROUP Group Therapy Note The therapist facilitated a group about the characteristics of addiction, and encouraged the patients to consider triggers, and ways to prevent relapsing. Attendees:  
 
  
 
Attendance: Did not attend Patient's Goal: Interventions/techniques: Follows Directions:  
 
Interactions:  
 
Mental Status:  
 
Behavior/appearance:  
 
Goals Achieved: Additional Notes:   
Ranjeet De León

## 2020-10-12 NOTE — BH NOTES
Dx: TDO, unspecified Psychosis    Patient affect  Is flat. She denies having any pain, depression, SI, HI and AV hallucination. She stated that she very restless. She unable to sit still for any length of time. She was giving Vistaril 50 mg po prn. She compliant with medication. Patient has been isolated to her room. She remains on close observation for safety. No acute distress noted.

## 2020-10-12 NOTE — GROUP NOTE
[facility] GROUP DOCUMENTATION INDIVIDUAL Group Therapy Note Date: 10/12/2020 Group Start Time: 1100 Group End Time: 0415 Group Topic: Process Group - Inpatient [facility] 2  NON ACUTE Tyrel Arboleda [facility] GROUP DOCUMENTATION GROUP Group Therapy Note Attendees: 7 Patients were encouraged to discuss their thoughts, feelings, and emotions. Patient's were also encouraged to be supportive of and listen to their peers. Attendance: Did not attend Patient's Goal:  N/A Interventions/techniques: Other N/A Follows Directions: Other N/A Interactions: Other N/A Mental Status: Other N/A Behavior/appearance: N/A Goals Achieved: N/A Additional Notes:  Patient did not attend group, despite having been encouraged to do so. [place]

## 2020-10-13 VITALS
BODY MASS INDEX: 17.07 KG/M2 | OXYGEN SATURATION: 97 % | HEIGHT: 64 IN | SYSTOLIC BLOOD PRESSURE: 124 MMHG | DIASTOLIC BLOOD PRESSURE: 79 MMHG | TEMPERATURE: 98.2 F | WEIGHT: 100 LBS | RESPIRATION RATE: 18 BRPM | HEART RATE: 83 BPM

## 2020-10-13 PROCEDURE — 74011250637 HC RX REV CODE- 250/637: Performed by: PSYCHIATRY & NEUROLOGY

## 2020-10-13 PROCEDURE — 74011250637 HC RX REV CODE- 250/637: Performed by: EMERGENCY MEDICINE

## 2020-10-13 RX ADMIN — CITALOPRAM HYDROBROMIDE 10 MG: 10 TABLET ORAL at 08:41

## 2020-10-13 RX ADMIN — HYDROXYZINE PAMOATE 50 MG: 50 CAPSULE ORAL at 07:19

## 2020-10-13 RX ADMIN — ACETAMINOPHEN 650 MG: 325 TABLET, FILM COATED ORAL at 08:40

## 2020-10-13 RX ADMIN — RISPERIDONE 2 MG: 2 TABLET ORAL at 08:40

## 2020-10-13 NOTE — GROUP NOTE
IP  GROUP DOCUMENTATION INDIVIDUAL Group Therapy Note Date: 10/13/2020 Group Start Time: 1300 Group End Time: 0469 Group Topic: Process Group - Inpatient [facility] 2 BH NON ACUTE [facility]  GROUP DOCUMENTATION GROUP Group Therapy Note Attendees: 5 Patients were encouraged to speak about their thoughts, feelings, and emotions. Patients were also encouraged to listen and be supportive of their peers. Attendance: Did not attend Patient's Goal:  N/A Interventions/techniques: Other N/A Follows Directions: Other N/A Interactions: Other N/A Mental Status: Other N/A Behavior/appearance: N/A Goals Achieved: N/A Additional Notes:  Pt did not attend group despite having been encouraged to do so. [place]

## 2020-10-13 NOTE — BH NOTES
Patient seen this morning presents slightly better. She has been more oriented and not labile not to internal stimuli or external stimuli this morning. She is much more coherent in conversation. Still presents anxious with limited insight. Patient focused on being dismissed by the TDO hearing. Patient denies any hallucinations this morning. Still continues to present with poor insight into need for mental health and substance abuse treatment. Has not been able to take  responsibility to the situations leading to her hospitalization. Has not distended group therapy. Assessment plan  Still presents labile and anxious isolates to self in the room.   Focused on wanting to be dismissed by TDO  Does not need to continue or see a need for inpatient or outpatient mental health and substance abuse treatment  Obtain further collateral no active SI HI TDO hearing tomorrow    Scheduled         citalopram (CELEXA) tablet 10 mg         10 mg, PO, DAILY         Last Action:  Given, 10 mg at 10/12 0810         nicotine (NICODERM CQ) 21 mg/24 hr patch 1 Patch         1 Patch, TD, DAILY         Last Action:  Apply Patch, 1 Patch at 10/12 0811         risperiDONE (RisperDAL) tablet 2 mg         2 mg, PO, BID         Last Action:  Given

## 2020-10-13 NOTE — BH NOTES
SAFETY PLAN    A suicide Safety Plan is a document that supports someone when they are having thoughts of suicide. Warning Signs that indicate a suicidal crisis may be developing: What (situations, thoughts, feelings, body sensations, behaviors, etc.) do you experience that lets you know you are beginning to think about suicide? 1. Crying Spells  2. Using drugs  3. Internal Coping Strategies:  What things can I do (relaxation techniques, hobbies, physical activities, etc.) to take my mind off my problems without contacting another person? 1. Being Outside  2. Music  3. People and social settings that provide distraction: Who can I call or where can I go to distract me? 1. Name: No one   Phone:   2. Name:   Phone:    3. Place: [place]          4. Place:     People whom I can ask for help: Who can I call when I need help - for example, friends, family, clergy, someone else? 1. Name: No one               Phone:   2. Name:   Phone:   3. Name:   Phone:     Professionals or [address] [place] [address] I can contact during a crisis: Who can I call for help - for example, my doctor, my psychiatrist, my psychologist, a mental health provider, a suicide hotline? 1. Clinician Name: N/A   Phone:       Clinician Pager or Emergency Contact #:     2. Clinician Name:    Phone:       Clinician Pager or Emergency Contact #:     3. Suicide Prevention Lifeline: 0-474-863-TALK (8333)    4. [address] [place] [address] Emergency Services -  for example, [facility] suicide hotline, [facility] Hotline: [facility]      Emergency Services Address:       Emergency Services Phone: [phone]    Making the environment safe: How can I make my environment (house/apartment/living space) safer? For example, can I remove guns, medications, and other items? 1. Remove drug paraphernalia   2.

## 2020-10-13 NOTE — BH NOTES
FIREARM ASSESSMENT    The therapist spoke with the patient's father who said that he has guns in the home, where the patient resides, which are locked up in a cabinet. He denied that she owns any herself. The therapist strongly encouraged him to keep the firearms secured by lock.

## 2020-10-13 NOTE — BH NOTES
DISCHARGE SUMMARY    NAME:Alexa Minor  : 1990  MRN: [id number]    The patient Kenyetta Thrasher exhibits the ability to control behavior in a less restrictive environment. Patient's level of functioning is improving. No assaultive/destructive behavior has been observed for the past 24 hours. No suicidal/homicidal threat or behavior has been observed for the past 24 hours. There is no evidence of serious medication side effects. Patient has not been in physical or protective restraints for at least the past 24 hours. If weapons involved, how are they secured? The father reports that all of his firearms are secured in a locked cabinet in his home. Is patient aware of and in agreement with discharge plan? Yes. Arrangements for medication:  The patient was not given any prescriptions due to the [facility] status of her discharge. Copy of discharge instructions to provider?:  Yes    Arrangements for transportation home:  Her father is picking her up in his private vehicle. Keep all follow up appointments as scheduled, continue to take prescribed medications per physician instructions.   Mental health crisis number:  [phone] or your local mental health crisis line number at [phone]
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
## 2020-10-13 NOTE — BH NOTES
Patient isolates to room throughout shift. Refuses snack refuses group. Presents disheveled, sad affect depressed mood. Denies SI. Med compliant. Instructed on safety, asking for help, s/s to report. Verbalizes understanding. Resting in room, q15 checks. Will continue to monitor.

## 2020-10-13 NOTE — MASTER TREATMENT PLAN
DISCHARGE SUMMARY 
 
NAME:Alexa Larios [place] : 1990 MRN: [id number] The patient Eve Roach exhibits the ability to control behavior in a less restrictive environment. Patient's level of functioning is improving. No assaultive/destructive behavior has been observed for the past 24 hours. No suicidal/homicidal threat or behavior has been observed for the past 24 hours. There is no evidence of serious medication side effects. Patient has not been in physical or protective restraints for at least the past 24 hours. If weapons involved, how are they secured? The patient's father, who she resides with, reports that his all of his firearms in the home are secured in a locked cabinet. Is patient aware of and in agreement with discharge plan? Yes. She was dismissed at her hearing, and is aware that she will be leaving [place]. Arrangements for medication:  Prescriptions given to patient, given a weeks supply or 30 day supply. Copy of discharge instructions to provider?:  The patient was discharged AMA, and no coordination between services was done. Arrangements for transportation home:  Her father is picking her up in his vehicle. He states that she agreed to go to a rehab program during the hearing with the , and that he and his wife are trying to arrange for her to either go to [facility] today, or [facility]. He said that if she does not adhere to the plan then she will be kicked out of his house. Keep all follow up appointments as scheduled, continue to take prescribed medications per physician instructions. Mental health crisis number:  [phone] or your local mental health crisis line number at [phone].

## 2020-10-13 NOTE — BH NOTES
Behavioral Health Transition Record to Provider    Patient Name: Roe Blanc  YOB: 1990  Medical Record Number: [id number]  Date of Admission: 10/9/2020  Date of Discharge: 10/13/20    Attending Provider: No att. providers found  Discharging Provider: N/A  To contact this individual call [phone] and ask the  to page. If unavailable, ask to be transferred to [facility] Provider on call. Mario Ruiz Provider will be available on call 24/7 and during holidays. Primary Care Provider: None    No Known Allergies    Reason for Admission: The patient was admitted to exhibiting psychotic symptoms, including disorganized thoughts and speech, as well as agitation, and responding to internal stimuli. She also tested positive for Amphetamines and has a history of substance abuse.      Admission Diagnosis: Acute psychosis ([facility] [address] [F23]  Schizoaffective disorder ([facility] [address] [F25.9]    * No surgery found *    Results for orders placed or performed during the hospital encounter of 10/09/20   SARS-COV-2   Result Value Ref Range    SARS-CoV-2 Not Detected Not Detected     URINALYSIS W/ REFLEX CULTURE    Specimen: Urine   Result Value Ref Range    Color Yellow/Straw      Appearance Clear Clear      Specific gravity 1.025 1.003 - 1.030      pH (UA) 5.0 5.0 - 8.0      Protein 100 (A) Negative mg/dL    Glucose Negative Negative mg/dL    Ketone 80 (A) Negative mg/dL    Bilirubin Negative Negative      Blood Negative Negative      Urobilinogen 0.1 0.1 - 1.0 EU/dL    Nitrites Negative Negative      Leukocyte Esterase Negative Negative      UA:UC IF INDICATED Culture not indicated by UA result Culture not indicated by UA result      WBC 5-10 0 - 4 /hpf    RBC 0-5 0 - 5 /hpf    Bacteria Negative Negative /hpf    Mucus Trace /lpf   HCG QL SERUM   Result Value Ref Range    HCG, Ql. Negative Negative     METABOLIC PANEL, COMPREHENSIVE   Result Value Ref Range    Sodium 139 136 - 145 mmol/L    Potassium 3.4 (L) 3.5 - 5.1 mmol/L    Chloride 105 97 - 108 mmol/L    CO2 14 (LL) 21 - 32 mmol/L    Anion gap 20 (H) 5 - 15 mmol/L    Glucose 88 65 - 100 mg/dL    BUN 34 (H) 6 - 20 mg/dL    Creatinine 0.83 0.55 - 1.02 mg/dL    BUN/Creatinine ratio 41 (H) 12 - 20      GFR est AA >60 >60 ml/min/1.73m2    GFR est non-AA >60 >60 ml/min/1.73m2    Calcium 9.4 8.5 - 10.1 mg/dL    Bilirubin, total 1.0 0.2 - 1.0 mg/dL    AST (SGOT) 43 (H) 15 - 37 U/L    ALT (SGPT) 23 12 - 78 U/L    Alk. phosphatase 86 45 - 117 U/L    Protein, total 7.8 6.4 - 8.2 g/dL    Albumin 4.3 3.5 - 5.0 g/dL    Globulin 3.5 2.0 - 4.0 g/dL    A-G Ratio 1.2 1.1 - 2.2     DRUG SCREEN, URINE   Result Value Ref Range    AMPHETAMINES Positive (A) Negative      BARBITURATES Negative Negative      BENZODIAZEPINES Negative Negative      COCAINE Negative Negative      METHADONE Negative Negative      OPIATES Negative Negative      PCP(PHENCYCLIDINE) Negative Negative      THC (TH-CANNABINOL) Negative Negative      Drug screen comment        This test is a screen for drugs of abuse in a medical setting only (i.e., they are unconfirmed results and as such must not be used for non-medical purposes, e.g.,employment testing, legal testing). Due to its inherent nature, false positive (FP) and false negative (FN) results may be obtained. Therefore, if necessary for medical care, recommend confirmation of positive findings by GC/MS. ACETAMINOPHEN   Result Value Ref Range    Acetaminophen level 4 (L) 10 - 30 ug/mL    Reported dose date Not provided      Reported dose: Not provided Units   SALICYLATE   Result Value Ref Range    Salicylate level 3.7 2.8 - 20.0 mg/dL    Reported dose date Not provided      Reported dose: Not provided Units   SARS-COV-2   Result Value Ref Range    SARS-CoV-2 Please find results under separate order     ETHYL ALCOHOL   Result Value Ref Range    ALCOHOL(ETHYL),SERUM <10 <10 mg/dL       Immunizations administered during this encounter:    There is no immunization history on file for this patient. Screening for Metabolic Disorders for Patients on Antipsychotic Medications  (Data obtained from the EMR)    Estimated Body Mass Index  Estimated body mass index is 17.16 kg/m² as calculated from the following:    Height as of this encounter: 5' 4\" (1.626 m). Weight as of this encounter: 45.4 kg (100 lb). Vital Signs/Blood Pressure  Visit Vitals  /79   Pulse 83   Temp 98.2 °F (36.8 °C)   Resp 18   Ht 5' 4\" (1.626 m)   Wt 45.4 kg (100 lb)   SpO2 97%   Breastfeeding No   BMI 17.16 kg/m²       Blood Glucose/Hemoglobin A1c  Lab Results   Component Value Date/Time    Glucose 88 10/09/2020 09:15 PM       No results found for: HBA1C, HGBE8, EEI2AHUR     Lipid Panel  No results found for: CHOL, CHOLX, CHLST, CHOLV, 026751, HDL, HDLP, LDL, LDLC, DLDLP, TGLX, TRIGL, TRIGP, CHHD, CHHDX     Discharge Diagnosis: 1.  Schizoaffective disorder. 2.  Psychosis, unspecified. 3.  Rule out substance-induced mood disorder. Positive for amphetamines    Discharge Plan: The patient was dismissed in her hearing today and will be discharged AMA as a result of her substance abuse that she is in denial about. Her family is working on getting her into a rehab program.     Discharge Medication List and Instructions: There are no discharge medications for this patient. Unresulted Labs (24h ago, onward)    None        To obtain results of studies pending at discharge, please contact  [phone]    Follow-up Information     Follow up With Specialties Details Why Contact Info    None    None ([phone]) Patient stated that they have no PCP            Advanced Directive:   Does the patient have an appointed surrogate decision maker? No  Does the patient have a Medical Advance Directive? No  Does the patient have a Psychiatric Advance Directive?  No  If the patient does not have a surrogate or Medical Advance Directive AND Psychiatric Advance Directive, the patient was offered information on these advance directives Patient declined to complete    Patient Instructions: The patient was not given any prescriptions to take once discharged due to her AMA status. Tobacco Cessation Discharge Plan:   Is the patient a smoker and needs referral for smoking cessation? No  Patient referred to the following for smoking cessation with an appointment? No     Patient was offered medication to assist with smoking cessation at discharge? No  Was education for smoking cessation added to the discharge instructions? Refused    Alcohol/Substance Abuse Discharge Plan:   Does the patient have a history of substance/alcohol abuse and requires a referral for treatment? Yes  Patient referred to the following for substance/alcohol abuse treatment with an appointment? Refused  Patient was offered medication to assist with alcohol cessation at discharge? No  Was education for substance/alcohol abuse added to discharge instructions?  Yes    Patient discharged to Home; discussed with patient/caregiver

## 2020-10-13 NOTE — BH NOTES
Discharge:    Patient had TDO hearing and was released. Patient was discharged AMA because she did not want to follow recommendations by provider and . Patient and nurse went over paperwork. Patient verbalized understanding. Patient belongings were returned. Patient denied SI/HI this shift. She stated she was still feeling anxious but did not want to be here. Patient was escorted to main entrance by staff and picked up by her father. Safety maintained throughout discharge.

## 2020-11-02 ENCOUNTER — TRANSCRIBE ORDER (OUTPATIENT)
Dept: SCHEDULING | Age: 30
End: 2020-11-02

## 2020-11-02 DIAGNOSIS — B18.2 CHRONIC HEPATITIS C WITH HEPATIC COMA (HCC): Primary | ICD-10-CM

## 2020-11-30 NOTE — DISCHARGE SUMMARY
Name:  Cynthia Terrazas  MR#:  [id number]  :  1990  ACCOUNT #:  [de-identified]  ADMIT DATE:  10/09/2020     Discharge summary     HISTORY OF PRESENT ILLNESS:  The patient is a 69-year-old  female, who was admitted to the behavioral health floor under TDO after the patient presented with a paperless ECO. The patient presented with psychosis unspecified at the time of initial presentation to the ED. The patient is a poor historian and information is thus obtained from talking with behavioral health intake staff, review of electronic records, and talking to the patient as well.     As per the information available, the patient presented initially to the emergency department unkempt, disheveled, oriented to self, disorganized thought process, anxious, labile with poor insight and judgment. The patient continues to stare, unable to provide information or express her feelings. The patient noted mumbling and difficult to understand. As per the report available and per [place] [employer], who picked the patient, they had noted that the patient was digging through her neighbor's trash can. When police had arrived at the scene, the patient was reported on the porch, yelling and screaming for everyone to leave her property. The patient also had wanted the police officers to talk to her brothers and as per the information from her father, both her brothers are at [facility] at school. The patient further had difficulty providing further information. The patient in the emergency department on arrival was noted to be yelling, screaming, actively responding, combative, had to be placed on two-point restraints and was given Geodon. The patient TDO'ed to the behavioral health floor. During inpatient psychiatric hospitalization patient was placed on close observation group therapy group therapy support group. Patient made slow progress and had very limited participation.   Patient often withdrawn continuing to stare unable to provide detailed information. Patient continued to improve more oriented less labile not actively seen responding to internal or external stimuli. She continued to improve more coherent in her conversation still presented with limited insight. Patient's father believes that she has been abusing Suboxone. Patient's family were more encouraging that she go into inpatient rehab. Patient's family preferred that the court mandate her to go into inpatient rehab. Patient family was encouraged to participate in the hearing via phone to advocate. Discharge diagnosis schizoaffective disorder  Psychosis unspecified  UDS positive for amphetamines  Reported abuse Suboxone    Patient was dismissed in the TDO hearing and patient still insists on wanting to leave so discharge [address] She also had declined inpatient rehab. Resources were provided      Scheduled                       citalopram (CELEXA) tablet 10 mg                    10 mg, PO, DAILY                      Last Action:  Given, 10 mg at 10/12 0810                      nicotine (NICODERM CQ) 21 mg/24 hr patch 1 Patch                          1 Patch, TD, DAILY                    Last Action:  Apply Patch, 1 Patch at 10/12 0811                      risperiDONE (RisperDAL) tablet 2 mg                           2 mg, PO, BID                             Last Action:  Given             Screening for Metabolic Disorders for Patients on Antipsychotic Medications  (Data obtained from the EMR)     Estimated Body Mass Index  Estimated body mass index is 17.16 kg/m² as calculated from the following:    Height as of this encounter: 5' 4\" (1.626 m). Weight as of this encounter: 45.4 kg (100 lb).       Vital Signs/Blood Pressure  Visit Vitals  /79  Pulse 83  Temp 98.2 °F (36.8 °C)  Resp 18  Ht 5' 4\" (1.626 m)  Wt 45.4 kg (100 lb)  SpO2 97%  Breastfeeding No  BMI 17.16 kg/m²        Blood Glucose/Hemoglobin A1c  Lab Results  Component Value Date/Time    Glucose 88 10/09/2020 09:15 PM              .

## 2021-02-28 ENCOUNTER — HOSPITAL ENCOUNTER (INPATIENT)
Age: 31
LOS: 17 days | Discharge: HOME | DRG: 812 | End: 2021-03-17
Attending: STUDENT IN AN ORGANIZED HEALTH CARE EDUCATION/TRAINING PROGRAM | Admitting: FAMILY MEDICINE
Payer: MEDICAID

## 2021-02-28 ENCOUNTER — APPOINTMENT (OUTPATIENT)
Dept: CT IMAGING | Age: 31
End: 2021-02-28
Attending: STUDENT IN AN ORGANIZED HEALTH CARE EDUCATION/TRAINING PROGRAM
Payer: MEDICAID

## 2021-02-28 ENCOUNTER — APPOINTMENT (OUTPATIENT)
Dept: CT IMAGING | Age: 31
DRG: 812 | End: 2021-02-28
Attending: STUDENT IN AN ORGANIZED HEALTH CARE EDUCATION/TRAINING PROGRAM
Payer: MEDICAID

## 2021-02-28 ENCOUNTER — HOSPITAL ENCOUNTER (EMERGENCY)
Age: 31
Discharge: HOME | End: 2021-02-28
Attending: STUDENT IN AN ORGANIZED HEALTH CARE EDUCATION/TRAINING PROGRAM | Admitting: HOSPITALIST
Payer: MEDICAID

## 2021-02-28 VITALS
SYSTOLIC BLOOD PRESSURE: 119 MMHG | TEMPERATURE: 99 F | DIASTOLIC BLOOD PRESSURE: 76 MMHG | HEART RATE: 124 BPM | HEIGHT: 66 IN | BODY MASS INDEX: 20.89 KG/M2 | WEIGHT: 130 LBS | RESPIRATION RATE: 20 BRPM | OXYGEN SATURATION: 98 %

## 2021-02-28 DIAGNOSIS — R41.82 ALTERED MENTAL STATUS, UNSPECIFIED ALTERED MENTAL STATUS TYPE: ICD-10-CM

## 2021-02-28 DIAGNOSIS — R77.8 ELEVATED TROPONIN: ICD-10-CM

## 2021-02-28 DIAGNOSIS — R31.9 URINARY TRACT INFECTION WITH HEMATURIA, SITE UNSPECIFIED: ICD-10-CM

## 2021-02-28 DIAGNOSIS — E87.6 HYPOKALEMIA: ICD-10-CM

## 2021-02-28 DIAGNOSIS — F19.10 IV DRUG ABUSE (HCC): ICD-10-CM

## 2021-02-28 DIAGNOSIS — B18.2 CHRONIC HEPATITIS C WITHOUT HEPATIC COMA (HCC): ICD-10-CM

## 2021-02-28 DIAGNOSIS — I63.10 CEREBROVASCULAR ACCIDENT (CVA) DUE TO EMBOLISM OF PRECEREBRAL ARTERY (HCC): ICD-10-CM

## 2021-02-28 DIAGNOSIS — I33.0 ACUTE BACTERIAL ENDOCARDITIS: ICD-10-CM

## 2021-02-28 DIAGNOSIS — N39.0 URINARY TRACT INFECTION WITH HEMATURIA, SITE UNSPECIFIED: ICD-10-CM

## 2021-02-28 DIAGNOSIS — R41.82 ALTERED MENTAL STATUS, UNSPECIFIED ALTERED MENTAL STATUS TYPE: Primary | ICD-10-CM

## 2021-02-28 DIAGNOSIS — D69.6 THROMBOCYTOPENIA (HCC): Primary | ICD-10-CM

## 2021-02-28 PROBLEM — A41.9 SEPSIS (HCC): Status: ACTIVE | Noted: 2021-02-28

## 2021-02-28 PROBLEM — G93.40 ACUTE ENCEPHALOPATHY: Status: ACTIVE | Noted: 2021-02-28

## 2021-02-28 LAB
ALBUMIN SERPL-MCNC: 2.6 G/DL (ref 3.5–5)
ALBUMIN SERPL-MCNC: 2.9 G/DL (ref 3.5–5)
ALBUMIN/GLOB SERPL: 0.6 {RATIO} (ref 1.1–2.2)
ALBUMIN/GLOB SERPL: 0.7 {RATIO} (ref 1.1–2.2)
ALP SERPL-CCNC: 202 U/L (ref 45–117)
ALP SERPL-CCNC: 204 U/L (ref 45–117)
ALT SERPL-CCNC: 44 U/L (ref 12–78)
ALT SERPL-CCNC: 54 U/L (ref 12–78)
AMMONIA PLAS-SCNC: <10 UMOL/L
AMPHET UR QL SCN: POSITIVE
AMPHET UR QL SCN: POSITIVE
ANION GAP SERPL CALC-SCNC: 10 MMOL/L (ref 5–15)
ANION GAP SERPL CALC-SCNC: 9 MMOL/L (ref 5–15)
APPEARANCE UR: ABNORMAL
APPEARANCE UR: CLEAR
AST SERPL W P-5'-P-CCNC: 33 U/L (ref 15–37)
AST SERPL-CCNC: 35 U/L (ref 15–37)
BACTERIA URNS QL MICRO: ABNORMAL /HPF
BACTERIA URNS QL MICRO: NEGATIVE /HPF
BARBITURATES UR QL SCN: NEGATIVE
BARBITURATES UR QL SCN: NEGATIVE
BASOPHILS # BLD: 0 K/UL (ref 0–0.1)
BASOPHILS # BLD: 0 K/UL (ref 0–0.1)
BASOPHILS NFR BLD: 0 % (ref 0–1)
BASOPHILS NFR BLD: 0 % (ref 0–1)
BENZODIAZ UR QL: NEGATIVE
BENZODIAZ UR QL: NEGATIVE
BILIRUB SERPL-MCNC: 1.4 MG/DL (ref 0.2–1)
BILIRUB SERPL-MCNC: 2.3 MG/DL (ref 0.2–1)
BILIRUB UR QL CFM: NEGATIVE
BILIRUB UR QL: NEGATIVE
BUN SERPL-MCNC: 20 MG/DL (ref 6–20)
BUN SERPL-MCNC: 22 MG/DL (ref 6–20)
BUN/CREAT SERPL: 36 (ref 12–20)
BUN/CREAT SERPL: 40 (ref 12–20)
CA-I BLD-MCNC: 9.2 MG/DL (ref 8.5–10.1)
CALCIUM SERPL-MCNC: 8.9 MG/DL (ref 8.5–10.1)
CANNABINOIDS UR QL SCN: POSITIVE
CANNABINOIDS UR QL SCN: POSITIVE
CHLORIDE SERPL-SCNC: 100 MMOL/L (ref 97–108)
CHLORIDE SERPL-SCNC: 106 MMOL/L (ref 97–108)
CK SERPL-CCNC: 131 U/L (ref 26–192)
CO2 SERPL-SCNC: 24 MMOL/L (ref 21–32)
CO2 SERPL-SCNC: 26 MMOL/L (ref 21–32)
COCAINE UR QL SCN: NEGATIVE
COCAINE UR QL SCN: NEGATIVE
COLOR UR: ABNORMAL
COLOR UR: ABNORMAL
COMMENT, HOLDF: NORMAL
COMMENT, HOLDF: NORMAL
COVID-19 RAPID TEST, COVR: NOT DETECTED
CREAT SERPL-MCNC: 0.55 MG/DL (ref 0.55–1.02)
CREAT SERPL-MCNC: 0.55 MG/DL (ref 0.55–1.02)
CRP SERPL-MCNC: 32.5 MG/DL (ref 0–0.6)
DIFFERENTIAL METHOD BLD: ABNORMAL
DIFFERENTIAL METHOD BLD: ABNORMAL
DRUG SCRN COMMENT,DRGCM: ABNORMAL
DRUG SCRN COMMENT,DRGCM: ABNORMAL
EOSINOPHIL # BLD: 0 K/UL (ref 0–0.4)
EOSINOPHIL # BLD: 0 K/UL (ref 0–0.4)
EOSINOPHIL NFR BLD: 0 % (ref 0–7)
EOSINOPHIL NFR BLD: 0 % (ref 0–7)
EPITH CASTS URNS QL MICRO: ABNORMAL /LPF
ERYTHROCYTE [DISTWIDTH] IN BLOOD BY AUTOMATED COUNT: 13.2 % (ref 11.5–14.5)
ERYTHROCYTE [DISTWIDTH] IN BLOOD BY AUTOMATED COUNT: 13.2 % (ref 11.5–14.5)
ERYTHROCYTE [SEDIMENTATION RATE] IN BLOOD: 41 MM/HR (ref 0–20)
ETHANOL SERPL-MCNC: <4 MG/DL
FLUAV AG NPH QL IA: NEGATIVE
FLUBV AG NOSE QL IA: NEGATIVE
GLOBULIN SER CALC-MCNC: 4.1 G/DL (ref 2–4)
GLOBULIN SER CALC-MCNC: 4.2 G/DL (ref 2–4)
GLUCOSE SERPL-MCNC: 142 MG/DL (ref 65–100)
GLUCOSE SERPL-MCNC: 151 MG/DL (ref 65–100)
GLUCOSE UR STRIP.AUTO-MCNC: NEGATIVE MG/DL
GLUCOSE UR STRIP.AUTO-MCNC: NEGATIVE MG/DL
HCG SERPL QL: NEGATIVE
HCG UR QL: NEGATIVE
HCT VFR BLD AUTO: 36.2 % (ref 35–47)
HCT VFR BLD AUTO: 37.1 % (ref 35–47)
HGB BLD-MCNC: 12.4 G/DL (ref 11.5–16)
HGB BLD-MCNC: 13.1 G/DL (ref 11.5–16)
HGB UR QL STRIP: ABNORMAL
HGB UR QL STRIP: ABNORMAL
HYALINE CASTS URNS QL MICRO: ABNORMAL /LPF (ref 0–5)
IMM GRANULOCYTES # BLD AUTO: 0 K/UL
IMM GRANULOCYTES # BLD AUTO: 0 K/UL (ref 0–0.04)
IMM GRANULOCYTES NFR BLD AUTO: 0 %
IMM GRANULOCYTES NFR BLD AUTO: 0 % (ref 0–0.5)
KETONES UR QL STRIP.AUTO: 15 MG/DL
KETONES UR QL STRIP.AUTO: 5 MG/DL
LACTATE SERPL-SCNC: 1.8 MMOL/L (ref 0.4–2)
LEUKOCYTE ESTERASE UR QL STRIP.AUTO: ABNORMAL
LEUKOCYTE ESTERASE UR QL STRIP.AUTO: ABNORMAL
LYMPHOCYTES # BLD: 0.4 K/UL (ref 0.8–3.5)
LYMPHOCYTES # BLD: 0.5 K/UL (ref 0.8–3.5)
LYMPHOCYTES NFR BLD: 3 % (ref 12–49)
LYMPHOCYTES NFR BLD: 5 % (ref 12–49)
MCH RBC QN AUTO: 29.2 PG (ref 26–34)
MCH RBC QN AUTO: 29.5 PG (ref 26–34)
MCHC RBC AUTO-ENTMCNC: 34.3 G/DL (ref 30–36.5)
MCHC RBC AUTO-ENTMCNC: 35.3 G/DL (ref 30–36.5)
MCV RBC AUTO: 82.8 FL (ref 80–99)
MCV RBC AUTO: 86 FL (ref 80–99)
METHADONE UR QL: NEGATIVE
METHADONE UR QL: NEGATIVE
MONOCYTES # BLD: 0.1 K/UL (ref 0–1)
MONOCYTES # BLD: 0.6 K/UL (ref 0–1)
MONOCYTES NFR BLD: 1 % (ref 5–13)
MONOCYTES NFR BLD: 6 % (ref 5–13)
NEUTS SEG # BLD: 12.9 K/UL (ref 1.8–8)
NEUTS SEG # BLD: 9.5 K/UL (ref 1.8–8)
NEUTS SEG NFR BLD: 89 % (ref 32–75)
NEUTS SEG NFR BLD: 96 % (ref 32–75)
NITRITE UR QL STRIP.AUTO: POSITIVE
NITRITE UR QL STRIP.AUTO: POSITIVE
NRBC # BLD: 0 K/UL (ref 0–0.01)
NRBC BLD-RTO: 0 PER 100 WBC
OPIATES UR QL: NEGATIVE
OPIATES UR QL: NEGATIVE
PCP UR QL: NEGATIVE
PCP UR QL: NEGATIVE
PH UR STRIP: 6 [PH] (ref 5–8)
PH UR STRIP: 6.5 [PH] (ref 5–8)
PLATELET # BLD AUTO: 61 K/UL (ref 150–400)
PLATELET # BLD AUTO: 72 K/UL (ref 150–400)
PMV BLD AUTO: 10.5 FL (ref 8.9–12.9)
PMV BLD AUTO: 10.6 FL (ref 8.9–12.9)
POTASSIUM SERPL-SCNC: 2.8 MMOL/L (ref 3.5–5.1)
POTASSIUM SERPL-SCNC: 3 MMOL/L (ref 3.5–5.1)
PROT SERPL-MCNC: 6.8 G/DL (ref 6.4–8.2)
PROT SERPL-MCNC: 7 G/DL (ref 6.4–8.2)
PROT UR STRIP-MCNC: 100 MG/DL
PROT UR STRIP-MCNC: 100 MG/DL
RBC # BLD AUTO: 4.21 M/UL (ref 3.8–5.2)
RBC # BLD AUTO: 4.48 M/UL (ref 3.8–5.2)
RBC #/AREA URNS HPF: >100 /HPF (ref 0–5)
RBC #/AREA URNS HPF: ABNORMAL /HPF (ref 0–5)
RBC MORPH BLD: ABNORMAL
SAMPLES BEING HELD,HOLD: NORMAL
SAMPLES BEING HELD,HOLD: NORMAL
SARS-COV-2, COV2: NORMAL
SODIUM SERPL-SCNC: 136 MMOL/L (ref 136–145)
SODIUM SERPL-SCNC: 139 MMOL/L (ref 136–145)
SP GR UR REFRACTOMETRY: 1.02 (ref 1–1.03)
SP GR UR REFRACTOMETRY: 1.02 (ref 1–1.03)
SPECIMEN SOURCE: NORMAL
TROPONIN I SERPL-MCNC: 0.14 NG/ML
TROPONIN I SERPL-MCNC: 0.18 NG/ML
UA: UC IF INDICATED,UAUC: ABNORMAL
UR CULT HOLD, URHOLD: NORMAL
UROBILINOGEN UR QL STRIP.AUTO: 2 EU/DL (ref 0.2–1)
UROBILINOGEN UR QL STRIP.AUTO: 4 EU/DL (ref 0.1–1)
WBC # BLD AUTO: 10.7 K/UL (ref 3.6–11)
WBC # BLD AUTO: 13.4 K/UL (ref 3.6–11)
WBC URNS QL MICRO: ABNORMAL /HPF (ref 0–4)
WBC URNS QL MICRO: ABNORMAL /HPF (ref 0–4)

## 2021-02-28 PROCEDURE — 84484 ASSAY OF TROPONIN QUANT: CPT

## 2021-02-28 PROCEDURE — 74011250637 HC RX REV CODE- 250/637: Performed by: STUDENT IN AN ORGANIZED HEALTH CARE EDUCATION/TRAINING PROGRAM

## 2021-02-28 PROCEDURE — 87186 SC STD MICRODIL/AGAR DIL: CPT

## 2021-02-28 PROCEDURE — 83605 ASSAY OF LACTIC ACID: CPT

## 2021-02-28 PROCEDURE — 99285 EMERGENCY DEPT VISIT HI MDM: CPT

## 2021-02-28 PROCEDURE — 80307 DRUG TEST PRSMV CHEM ANLYZR: CPT

## 2021-02-28 PROCEDURE — 87040 BLOOD CULTURE FOR BACTERIA: CPT

## 2021-02-28 PROCEDURE — 96375 TX/PRO/DX INJ NEW DRUG ADDON: CPT

## 2021-02-28 PROCEDURE — 93005 ELECTROCARDIOGRAM TRACING: CPT

## 2021-02-28 PROCEDURE — 85025 COMPLETE CBC W/AUTO DIFF WBC: CPT

## 2021-02-28 PROCEDURE — 74011250636 HC RX REV CODE- 250/636: Performed by: EMERGENCY MEDICINE

## 2021-02-28 PROCEDURE — 85652 RBC SED RATE AUTOMATED: CPT

## 2021-02-28 PROCEDURE — 86140 C-REACTIVE PROTEIN: CPT

## 2021-02-28 PROCEDURE — 81001 URINALYSIS AUTO W/SCOPE: CPT

## 2021-02-28 PROCEDURE — 87635 SARS-COV-2 COVID-19 AMP PRB: CPT

## 2021-02-28 PROCEDURE — 87077 CULTURE AEROBIC IDENTIFY: CPT

## 2021-02-28 PROCEDURE — 81025 URINE PREGNANCY TEST: CPT

## 2021-02-28 PROCEDURE — 65270000029 HC RM PRIVATE

## 2021-02-28 PROCEDURE — 65660000000 HC RM CCU STEPDOWN

## 2021-02-28 PROCEDURE — 74011000250 HC RX REV CODE- 250: Performed by: STUDENT IN AN ORGANIZED HEALTH CARE EDUCATION/TRAINING PROGRAM

## 2021-02-28 PROCEDURE — 87086 URINE CULTURE/COLONY COUNT: CPT

## 2021-02-28 PROCEDURE — 96374 THER/PROPH/DIAG INJ IV PUSH: CPT

## 2021-02-28 PROCEDURE — 80053 COMPREHEN METABOLIC PANEL: CPT

## 2021-02-28 PROCEDURE — 82077 ASSAY SPEC XCP UR&BREATH IA: CPT

## 2021-02-28 PROCEDURE — 36415 COLL VENOUS BLD VENIPUNCTURE: CPT

## 2021-02-28 PROCEDURE — 70450 CT HEAD/BRAIN W/O DYE: CPT

## 2021-02-28 PROCEDURE — 74011250636 HC RX REV CODE- 250/636: Performed by: STUDENT IN AN ORGANIZED HEALTH CARE EDUCATION/TRAINING PROGRAM

## 2021-02-28 PROCEDURE — 87804 INFLUENZA ASSAY W/OPTIC: CPT

## 2021-02-28 PROCEDURE — 84703 CHORIONIC GONADOTROPIN ASSAY: CPT

## 2021-02-28 PROCEDURE — 74011250636 HC RX REV CODE- 250/636: Performed by: FAMILY MEDICINE

## 2021-02-28 PROCEDURE — 87147 CULTURE TYPE IMMUNOLOGIC: CPT

## 2021-02-28 PROCEDURE — 82550 ASSAY OF CK (CPK): CPT

## 2021-02-28 PROCEDURE — 82140 ASSAY OF AMMONIA: CPT

## 2021-02-28 PROCEDURE — 71275 CT ANGIOGRAPHY CHEST: CPT

## 2021-02-28 PROCEDURE — 99284 EMERGENCY DEPT VISIT MOD MDM: CPT

## 2021-02-28 PROCEDURE — 74011000636 HC RX REV CODE- 636: Performed by: RADIOLOGY

## 2021-02-28 RX ORDER — ONDANSETRON 2 MG/ML
4 INJECTION INTRAMUSCULAR; INTRAVENOUS
Status: DISCONTINUED | OUTPATIENT
Start: 2021-02-28 | End: 2021-03-17 | Stop reason: HOSPADM

## 2021-02-28 RX ORDER — ACETAMINOPHEN 500 MG
1000 TABLET ORAL ONCE
Status: COMPLETED | OUTPATIENT
Start: 2021-02-28 | End: 2021-02-28

## 2021-02-28 RX ORDER — SODIUM CHLORIDE AND POTASSIUM CHLORIDE .9; .15 G/100ML; G/100ML
SOLUTION INTRAVENOUS CONTINUOUS
Status: DISCONTINUED | OUTPATIENT
Start: 2021-02-28 | End: 2021-03-02

## 2021-02-28 RX ORDER — LORAZEPAM 2 MG/ML
1 INJECTION INTRAMUSCULAR
Status: COMPLETED | OUTPATIENT
Start: 2021-02-28 | End: 2021-02-28

## 2021-02-28 RX ORDER — HYDROXYZINE 25 MG/1
25 TABLET, FILM COATED ORAL
Status: COMPLETED | OUTPATIENT
Start: 2021-02-28 | End: 2021-02-28

## 2021-02-28 RX ORDER — LORAZEPAM 2 MG/ML
1 INJECTION INTRAMUSCULAR
Status: COMPLETED | OUTPATIENT
Start: 2021-02-28 | End: 2021-03-01

## 2021-02-28 RX ORDER — POLYETHYLENE GLYCOL 3350 17 G/17G
17 POWDER, FOR SOLUTION ORAL DAILY PRN
Status: DISCONTINUED | OUTPATIENT
Start: 2021-02-28 | End: 2021-03-17 | Stop reason: HOSPADM

## 2021-02-28 RX ORDER — METRONIDAZOLE 500 MG/100ML
500 INJECTION, SOLUTION INTRAVENOUS EVERY 12 HOURS
Status: DISCONTINUED | OUTPATIENT
Start: 2021-03-01 | End: 2021-03-01

## 2021-02-28 RX ORDER — CEPHALEXIN 250 MG/1
250 CAPSULE ORAL 3 TIMES DAILY
Qty: 21 CAP | Refills: 0 | Status: SHIPPED | OUTPATIENT
Start: 2021-02-28

## 2021-02-28 RX ORDER — KETOROLAC TROMETHAMINE 30 MG/ML
30 INJECTION, SOLUTION INTRAMUSCULAR; INTRAVENOUS
Status: COMPLETED | OUTPATIENT
Start: 2021-02-28 | End: 2021-02-28

## 2021-02-28 RX ORDER — VANCOMYCIN HYDROCHLORIDE
1250 ONCE
Status: COMPLETED | OUTPATIENT
Start: 2021-02-28 | End: 2021-03-01

## 2021-02-28 RX ORDER — SODIUM CHLORIDE 9 MG/ML
1000 INJECTION, SOLUTION INTRAVENOUS ONCE
Status: COMPLETED | OUTPATIENT
Start: 2021-02-28 | End: 2021-03-01

## 2021-02-28 RX ORDER — SODIUM CHLORIDE 0.9 % (FLUSH) 0.9 %
5-40 SYRINGE (ML) INJECTION EVERY 8 HOURS
Status: DISCONTINUED | OUTPATIENT
Start: 2021-02-28 | End: 2021-03-17 | Stop reason: HOSPADM

## 2021-02-28 RX ORDER — PROMETHAZINE HYDROCHLORIDE 25 MG/1
12.5 TABLET ORAL
Status: DISCONTINUED | OUTPATIENT
Start: 2021-02-28 | End: 2021-03-17 | Stop reason: HOSPADM

## 2021-02-28 RX ORDER — ACETAMINOPHEN 325 MG/1
650 TABLET ORAL
Status: DISCONTINUED | OUTPATIENT
Start: 2021-02-28 | End: 2021-03-10

## 2021-02-28 RX ORDER — POTASSIUM CHLORIDE 750 MG/1
40 TABLET, FILM COATED, EXTENDED RELEASE ORAL
Status: COMPLETED | OUTPATIENT
Start: 2021-02-28 | End: 2021-02-28

## 2021-02-28 RX ORDER — CEPHALEXIN 250 MG/1
250 CAPSULE ORAL 3 TIMES DAILY
Qty: 21 CAP | Refills: 0 | Status: SHIPPED | OUTPATIENT
Start: 2021-02-28 | End: 2021-02-28 | Stop reason: SDUPTHER

## 2021-02-28 RX ORDER — ENOXAPARIN SODIUM 100 MG/ML
40 INJECTION SUBCUTANEOUS DAILY
Status: DISCONTINUED | OUTPATIENT
Start: 2021-03-01 | End: 2021-03-06

## 2021-02-28 RX ORDER — ACETAMINOPHEN 650 MG/1
650 SUPPOSITORY RECTAL
Status: DISCONTINUED | OUTPATIENT
Start: 2021-02-28 | End: 2021-03-10

## 2021-02-28 RX ORDER — LORAZEPAM 0.5 MG/1
1 TABLET ORAL
Status: DISCONTINUED | OUTPATIENT
Start: 2021-02-28 | End: 2021-02-28

## 2021-02-28 RX ORDER — SODIUM CHLORIDE 0.9 % (FLUSH) 0.9 %
5-40 SYRINGE (ML) INJECTION AS NEEDED
Status: DISCONTINUED | OUTPATIENT
Start: 2021-02-28 | End: 2021-03-17 | Stop reason: HOSPADM

## 2021-02-28 RX ORDER — POTASSIUM CHLORIDE 750 MG/1
10 TABLET, FILM COATED, EXTENDED RELEASE ORAL
Status: COMPLETED | OUTPATIENT
Start: 2021-02-28 | End: 2021-02-28

## 2021-02-28 RX ADMIN — IOPAMIDOL 80 ML: 755 INJECTION, SOLUTION INTRAVENOUS at 21:36

## 2021-02-28 RX ADMIN — Medication 10 ML: at 23:41

## 2021-02-28 RX ADMIN — KETOROLAC TROMETHAMINE 30 MG: 30 INJECTION, SOLUTION INTRAMUSCULAR at 22:15

## 2021-02-28 RX ADMIN — SODIUM CHLORIDE 1000 ML: 9 INJECTION, SOLUTION INTRAVENOUS at 09:19

## 2021-02-28 RX ADMIN — ACETAMINOPHEN 1000 MG: 500 TABLET ORAL at 08:13

## 2021-02-28 RX ADMIN — SODIUM CHLORIDE 1000 ML: 9 INJECTION, SOLUTION INTRAVENOUS at 20:44

## 2021-02-28 RX ADMIN — HYDROXYZINE HYDROCHLORIDE 25 MG: 25 TABLET, FILM COATED ORAL at 22:15

## 2021-02-28 RX ADMIN — POTASSIUM CHLORIDE AND SODIUM CHLORIDE: 900; 150 INJECTION, SOLUTION INTRAVENOUS at 23:40

## 2021-02-28 RX ADMIN — CEFTRIAXONE SODIUM 2 G: 2 INJECTION, POWDER, FOR SOLUTION INTRAMUSCULAR; INTRAVENOUS at 11:21

## 2021-02-28 RX ADMIN — SODIUM CHLORIDE 1000 ML: 9 INJECTION, SOLUTION INTRAVENOUS at 06:10

## 2021-02-28 RX ADMIN — POTASSIUM CHLORIDE 40 MEQ: 750 TABLET, FILM COATED, EXTENDED RELEASE ORAL at 11:21

## 2021-02-28 RX ADMIN — VANCOMYCIN HYDROCHLORIDE 1250 MG: 10 INJECTION, POWDER, LYOPHILIZED, FOR SOLUTION INTRAVENOUS at 20:59

## 2021-02-28 RX ADMIN — METRONIDAZOLE 500 MG: 500 INJECTION, SOLUTION INTRAVENOUS at 23:45

## 2021-02-28 RX ADMIN — LORAZEPAM 1 MG: 2 INJECTION INTRAMUSCULAR at 06:11

## 2021-02-28 RX ADMIN — POTASSIUM CHLORIDE 10 MEQ: 750 TABLET, FILM COATED, EXTENDED RELEASE ORAL at 22:22

## 2021-02-28 NOTE — Clinical Note
Status[de-identified] INPATIENT [101]   Type of Bed: Remote Telemetry [29]   Inpatient Hospitalization Certified Necessary for the Following Reasons: 9.  Other (further clarification in H&P documentation)   Admitting Diagnosis: Acute encephalopathy [[id number]]   Admitting Physician: Pamela Cormier [[id number]]   Attending Physician: Pamela Cormier [[id number]]   Estimated Length of Stay: 3-4 Midnights   Discharge Plan[de-identified] [facility]

## 2021-02-28 NOTE — ED PROVIDER NOTES
EMERGENCY DEPARTMENT HISTORY AND PHYSICAL EXAM      Date: 2/28/2021  Patient Name: Puja Casiano    History of Presenting Illness     Chief Complaint   Patient presents with    Other       History Provided By: patient, triage note    HPI: Puja Casiano, 31 y.o. female with a past medical history significant Schizoaffective disorder, substance abuse disorder presents to the ED with cc of total body pain for 3 days. Patient providing minimal history, falling asleep during interview intermittently wakes up in the ninth and states that she has total body pain, does not give full history. I attempted to call contact listed in patient's chart Mr. Shey Higginbotham listed as life partner, no answer left message on voicemail. As per chart review patient was admitted in December to [facility] regional behavioral health for psychosis, substance abuse disorder. There are no other complaints, changes, or physical findings at this time. PCP: Unknown, Provider    Current Facility-Administered Medications   Medication Dose Route Frequency Provider Last Rate Last Admin    potassium chloride SR (KLOR-CON 10) tablet 40 mEq  40 mEq Oral NOW Tammy Maldonado MD        cefTRIAXone (ROCEPHIN) 2 g in sterile water (preservative free) 20 mL IV syringe  2 g IntraVENous NOW Tammy Maldonado MD           Past History     Past Medical History:  Past Medical History:   Diagnosis Date    Aggressive outburst     Anxiety disorder     Hepatitis C        Past Surgical History:  History reviewed. No pertinent surgical history. Family History:  History reviewed. No pertinent family history.     Social History:  Social History     Tobacco Use    Smoking status: Former Smoker     Types: Cigarettes    Smokeless tobacco: Never Used    Tobacco comment: pt refused   Substance Use Topics    Alcohol use: Not Currently    Drug use: Yes     Types: Marijuana, Heroin     Comment: positive for amphetamines in ED       Allergies:  No Known Allergies      Review of Systems     Review of Systems   Unable to perform ROS: Psychiatric disorder       Physical Exam     Physical Exam  Vitals signs and nursing note reviewed. Constitutional:       General: She is not in acute distress. Appearance: She is ill-appearing. She is not toxic-appearing. HENT:      Head: Normocephalic and atraumatic. Nose: Nose normal.      Mouth/Throat:      Mouth: Mucous membranes are moist.   Eyes:      Extraocular Movements: Extraocular movements intact. Conjunctiva/sclera: Conjunctivae normal.   Neck:      Musculoskeletal: Normal range of motion and neck supple. Cardiovascular:      Rate and Rhythm: Regular rhythm. Tachycardia present. Pulses: Normal pulses. Pulmonary:      Effort: Pulmonary effort is normal.      Breath sounds: Normal breath sounds. Abdominal:      General: Abdomen is flat. There is no distension. Palpations: Abdomen is soft. Tenderness: There is generalized abdominal tenderness. Musculoskeletal: Normal range of motion. General: No swelling, tenderness or signs of injury. Lymphadenopathy:      Cervical: No cervical adenopathy. Skin:     General: Skin is warm. Comments: Multiple pock marks and abrasions over face and arms in various stages of healing. Neurological:      Mental Status: She is alert. Cranial Nerves: No cranial nerve deficit. Motor: No weakness.    Psychiatric:      Comments: Anxious,          Diagnostic Study Results     Labs -     Recent Results (from the past 12 hour(s))   CBC WITH AUTOMATED DIFF    Collection Time: 02/28/21  6:00 AM   Result Value Ref Range    WBC 10.7 3.6 - 11.0 K/uL    RBC 4.21 3.80 - 5.20 M/uL    HGB 12.4 11.5 - 16.0 g/dL    HCT 36.2 35.0 - 47.0 %    MCV 86.0 80.0 - 99.0 FL    MCH 29.5 26.0 - 34.0 PG    MCHC 34.3 30.0 - 36.5 g/dL    RDW 13.2 11.5 - 14.5 %    PLATELET 72 (L) 767 - 400 K/uL    MPV 10.5 8.9 - 12.9 FL    NEUTROPHILS 89 (H) 32 - 75 % LYMPHOCYTES 5 (L) 12 - 49 %    MONOCYTES 6 5 - 13 %    EOSINOPHILS 0 0 - 7 %    BASOPHILS 0 0 - 1 %    IMMATURE GRANULOCYTES 0 0.0 - 0.5 %    ABS. NEUTROPHILS 9.5 (H) 1.8 - 8.0 K/UL    ABS. LYMPHOCYTES 0.5 (L) 0.8 - 3.5 K/UL    ABS. MONOCYTES 0.6 0.0 - 1.0 K/UL    ABS. EOSINOPHILS 0.0 0.0 - 0.4 K/UL    ABS. BASOPHILS 0.0 0.0 - 0.1 K/UL    ABS. IMM. GRANS. 0.0 0.00 - 0.04 K/UL    DF AUTOMATED     METABOLIC PANEL, COMPREHENSIVE    Collection Time: 02/28/21  6:00 AM   Result Value Ref Range    Sodium 136 136 - 145 mmol/L    Potassium 2.8 (L) 3.5 - 5.1 mmol/L    Chloride 100 97 - 108 mmol/L    CO2 26 21 - 32 mmol/L    Anion gap 10 5 - 15 mmol/L    Glucose 151 (H) 65 - 100 mg/dL    BUN 22 (H) 6 - 20 mg/dL    Creatinine 0.55 0.55 - 1.02 mg/dL    BUN/Creatinine ratio 40 (H) 12 - 20      GFR est AA >60 >60 ml/min/1.73m2    GFR est non-AA >60 >60 ml/min/1.73m2    Calcium 9.2 8.5 - 10.1 mg/dL    Bilirubin, total 1.4 (H) 0.2 - 1.0 mg/dL    AST (SGOT) 33 15 - 37 U/L    ALT (SGPT) 54 12 - 78 U/L    Alk.  phosphatase 204 (H) 45 - 117 U/L    Protein, total 7.0 6.4 - 8.2 g/dL    Albumin 2.9 (L) 3.5 - 5.0 g/dL    Globulin 4.1 (H) 2.0 - 4.0 g/dL    A-G Ratio 0.7 (L) 1.1 - 2.2     HCG QL SERUM    Collection Time: 02/28/21  6:00 AM   Result Value Ref Range    HCG, Ql. Negative Negative     URINALYSIS W/ REFLEX CULTURE    Collection Time: 02/28/21  6:00 AM    Specimen: Urine   Result Value Ref Range    Color Paige      Appearance Turbid (A) Clear      Specific gravity 1.016 1.003 - 1.030      pH (UA) 6.0 5.0 - 8.0      Protein 100 (A) Negative mg/dL    Glucose Negative Negative mg/dL    Ketone 5 (A) Negative mg/dL    Bilirubin Negative Negative      Blood Large (A) Negative      Urobilinogen 4.0 (H) 0.1 - 1.0 EU/dL    Nitrites Positive (A) Negative      Leukocyte Esterase Trace (A) Negative      UA:UC IF INDICATED Culture not indicated by UA result Culture not indicated by UA result      WBC 5-10 0 - 4 /hpf    RBC 20-50 0 - 5 /hpf Bacteria Negative Negative /hpf   DRUG SCREEN, URINE    Collection Time: 02/28/21  6:00 AM   Result Value Ref Range    AMPHETAMINES Positive (A) Negative      BARBITURATES Negative Negative      BENZODIAZEPINES Negative Negative      COCAINE Negative Negative      METHADONE Negative Negative      OPIATES Negative Negative      PCP(PHENCYCLIDINE) Negative Negative      THC (TH-CANNABINOL) Positive (A) Negative      Drug screen comment        This test is a screen for drugs of abuse in a medical setting only (i.e., they are unconfirmed results and as such must not be used for non-medical purposes, e.g.,employment testing, legal testing). Due to its inherent nature, false positive (FP) and false negative (FN) results may be obtained. Therefore, if necessary for medical care, recommend confirmation of positive findings by GC/MS. ETHYL ALCOHOL    Collection Time: 02/28/21  6:00 AM   Result Value Ref Range    ALCOHOL(ETHYL),SERUM <4 <10 mg/dL   TROPONIN I    Collection Time: 02/28/21  8:00 AM   Result Value Ref Range    Troponin-I, Qt. 0.14 (H) <0.05 ng/mL   CK    Collection Time: 02/28/21  8:00 AM   Result Value Ref Range     26 - 192 U/L       Radiologic Studies -   [unfilled]  CT Results  (Last 48 hours)               02/28/21 0832  CT HEAD WO CONT Final result    Impression:  Negative CT of the brain. Narrative:  Axial images from the skull base to the vertex were obtained without the use of   contrast. Bone windows were reviewed. Sagittal and coronal reformatted images   were also reviewed. All CT scans at this facility are performed using dose   reduction optimization techniques as appropriate to a performed exam including   the following:   automated exposure control, adjustments of the mA and/or kV   according to patient size, or use of iterative reconstruction technique. INDICATION: Headache. There is no evidence of hemorrhage. No mass or mass effect is seen.  There is no   acute ischemia. Ventricles, sulci and cisterns are intact. Normal gray matter/white matter   differentiation. Bone windows show no acute abnormalities. The visualized portions of the orbits,   paranasal sinuses and mastoid air cells are unremarkable. Review of the   reconstructed images show no additional findings. CXR Results  (Last 48 hours)    None          Medical Decision Making and ED Course   I am the first provider for this patient. I reviewed the vital signs, available nursing notes, past medical history, past surgical history, family history and social history. Vital Signs-Reviewed the patient's vital signs. Patient Vitals for the past 12 hrs:   Temp Pulse Resp BP SpO2   02/28/21 0758 99.8 °F (37.7 °C) (!) 110 27 124/76 97 %   02/28/21 0542 99 °F (37.2 °C) 94 20 126/73 99 %       EKG interpretation: (Preliminary)  Sinus tachycardia 119, no ST elevations, normal axis      Records Reviewed: Nursing Notes and Old Medical Records    The patient presents with total body pain, weakness with a differential diagnosis of mental health disorder, withdrawal from substance abuse, UTI, sepsis. Provider Notes (Medical Decision Making):     MDM   69-year-old female, past medical history of schizoaffective disorder, bipolar disorder, substance abuse disorder, presents to emergency department from home for total body pain reportedly for the last 3 days. Patient was extremely anxious upon arrival, previous team administered 2 mg of Ativan. Currently patient awake, does not provide adequate history, falling asleep during interview. Physical exam shows tachycardia 105-110, disheveled appearing female. Clear breath sounds, abdomen soft nondistended, reported diffuse tenderness to palpation. No signs of acute injury no signs of traumatic injury. We will draw basic lab work including tox screens, attempt to reach family to gain more history. ED Course:   Initial assessment performed.  The patients presenting problems have been discussed, and they are in agreement with the care plan formulated and outlined with them. I have encouraged them to ask questions as they arise throughout their visit. ED Course as of Feb 28 1028   Thuan Garciayohan Feb 28, 2021   0809 Patient's boyfriend Dustin Alaniz returned call, states that patient has been acting more confused over the last several days, much more somnolent, falling asleep during conversations, and complaining of total body pain. States that this not consistent with her psychiatric disorders. Patient reassessed, states that she just has come total body pain does not offer any more history, patient afebrile at triage, no significant white count, no nuchal rigidity, do not have high suspicion for meningitis at this time. [PZ]   4915 Patient metabolic panel resulting, potassium low 2.8, and 22 creatinine 0.55, 40 mEq of potassium p.o. ordered. Additionally patient's lab work significant for elevation of troponin, 0.14. EKG reviewed no ST elevations. Urinalysis and tox screen still pending. Head CT resulted by radiology, no acute abnormalities noted. [PZ]   [id number] Patient reassessed, patient's boyfriend at bedside, stating patient complaining of pain in throat increasingly confused over the last several days. Again I reassessed patient patient falling asleep midsentence, attempted to passively flex head patient complaining of severe pain, becomes very stiff, although this is the same reaction over any physical touch. Unclear if there is significant nuchal rigidity however given mental status changes, will treat for presumptive meningitis. CBC reviewed, platelets are 70 at this time will not attempt LP due to thrombocytopenia. [PZ]   [id number] Given altered mentation, unclear history, will treat presumptively with 2 g of Rocephin.       Discussed case with , will admit for altered mental status, elevated troponins, UA U tox are still pending.    [PZ] ED Course User Index  [PZ] Noreen Harrington MD         Procedures       Keon Zavala MD  Procedures             Disposition   Admit patient          Diagnosis     Clinical Impression:   1. Altered mental status, unspecified altered mental status type    2. Elevated troponin        Attestations:    Keon Zavala MD    Please note that this dictation was completed with [employer], the computer voice recognition software. Quite often unanticipated grammatical, syntax, homophones, and other interpretive errors are inadvertently transcribed by the computer software. Please disregard these errors. Please excuse any errors that have escaped final proofreading. Thank you.

## 2021-02-28 NOTE — ED NOTES
Pt arrives via POV from [facility] accompanied by mother with AMS and chest pain. Mother reports pt as active IV drug user. Pt unable to get out of vehicle upon arrival to [facility] ED d/t generalized pain. Pt moaning and tearful in triage, grabbing right side of chest. Track marks noted on both arms.

## 2021-02-28 NOTE — ED NOTES
Pt states she wants to use BR and wants to stand up. Make shift bedside commode (chair and bedpan) arranged at bedside. Pt able to stand c assist, nervous but pivoted s difficulty then voided . Pt continues to state she wants to go home. Instructed pt that the doctor wants her to get more IVF and meds, trying to get her better. States \"I don't care I just want to go home\". Assisted back to bed and wants her mom to be called to pick her up.  Call placed to mom went straight to voice mail

## 2021-03-01 LAB
ALBUMIN SERPL-MCNC: 2.1 G/DL (ref 3.5–5)
ALBUMIN/GLOB SERPL: 0.5 {RATIO} (ref 1.1–2.2)
ALP SERPL-CCNC: 177 U/L (ref 45–117)
ALT SERPL-CCNC: 34 U/L (ref 12–78)
ANION GAP SERPL CALC-SCNC: 9 MMOL/L (ref 5–15)
AST SERPL-CCNC: 36 U/L (ref 15–37)
ATRIAL RATE: 119 BPM
ATRIAL RATE: 130 BPM
BASOPHILS # BLD: 0 K/UL (ref 0–0.1)
BASOPHILS NFR BLD: 0 % (ref 0–1)
BILIRUB SERPL-MCNC: 2.2 MG/DL (ref 0.2–1)
BUN SERPL-MCNC: 19 MG/DL (ref 6–20)
BUN/CREAT SERPL: 42 (ref 12–20)
CALCIUM SERPL-MCNC: 8.8 MG/DL (ref 8.5–10.1)
CALCULATED P AXIS, ECG09: 72 DEGREES
CALCULATED P AXIS, ECG09: 75 DEGREES
CALCULATED R AXIS, ECG10: 91 DEGREES
CALCULATED R AXIS, ECG10: 98 DEGREES
CALCULATED T AXIS, ECG11: 46 DEGREES
CALCULATED T AXIS, ECG11: 53 DEGREES
CHLORIDE SERPL-SCNC: 111 MMOL/L (ref 97–108)
CO2 SERPL-SCNC: 22 MMOL/L (ref 21–32)
COMMENT, HOLDF: NORMAL
CREAT SERPL-MCNC: 0.45 MG/DL (ref 0.55–1.02)
DIAGNOSIS, 93000: NORMAL
DIAGNOSIS, 93000: NORMAL
DIFFERENTIAL METHOD BLD: ABNORMAL
EOSINOPHIL # BLD: 0 K/UL (ref 0–0.4)
EOSINOPHIL NFR BLD: 0 % (ref 0–7)
ERYTHROCYTE [DISTWIDTH] IN BLOOD BY AUTOMATED COUNT: 13.4 % (ref 11.5–14.5)
GLOBULIN SER CALC-MCNC: 3.9 G/DL (ref 2–4)
GLUCOSE SERPL-MCNC: 108 MG/DL (ref 65–100)
HCT VFR BLD AUTO: 31.6 % (ref 35–47)
HGB BLD-MCNC: 11.1 G/DL (ref 11.5–16)
IMM GRANULOCYTES # BLD AUTO: 0 K/UL
IMM GRANULOCYTES NFR BLD AUTO: 0 %
LYMPHOCYTES # BLD: 0.3 K/UL (ref 0.8–3.5)
LYMPHOCYTES NFR BLD: 2 % (ref 12–49)
MCH RBC QN AUTO: 29.2 PG (ref 26–34)
MCHC RBC AUTO-ENTMCNC: 35.1 G/DL (ref 30–36.5)
MCV RBC AUTO: 83.2 FL (ref 80–99)
MONOCYTES # BLD: 0.3 K/UL (ref 0–1)
MONOCYTES NFR BLD: 2 % (ref 5–13)
NEUTS BAND NFR BLD MANUAL: 2 % (ref 0–6)
NEUTS SEG # BLD: 13.9 K/UL (ref 1.8–8)
NEUTS SEG NFR BLD: 94 % (ref 32–75)
NRBC # BLD: 0 K/UL (ref 0–0.01)
NRBC BLD-RTO: 0 PER 100 WBC
P-R INTERVAL, ECG05: 138 MS
P-R INTERVAL, ECG05: 148 MS
PLATELET # BLD AUTO: 56 K/UL (ref 150–400)
PMV BLD AUTO: 10.2 FL (ref 8.9–12.9)
POTASSIUM SERPL-SCNC: 3 MMOL/L (ref 3.5–5.1)
PROT SERPL-MCNC: 6 G/DL (ref 6.4–8.2)
Q-T INTERVAL, ECG07: 322 MS
Q-T INTERVAL, ECG07: 348 MS
QRS DURATION, ECG06: 92 MS
QRS DURATION, ECG06: 94 MS
QTC CALCULATION (BEZET), ECG08: 473 MS
QTC CALCULATION (BEZET), ECG08: 489 MS
RBC # BLD AUTO: 3.8 M/UL (ref 3.8–5.2)
RBC MORPH BLD: ABNORMAL
SAMPLES BEING HELD,HOLD: NORMAL
SODIUM SERPL-SCNC: 142 MMOL/L (ref 136–145)
TROPONIN I SERPL-MCNC: 0.18 NG/ML
VENTRICULAR RATE, ECG03: 119 BPM
VENTRICULAR RATE, ECG03: 130 BPM
WBC # BLD AUTO: 14.5 K/UL (ref 3.6–11)

## 2021-03-01 PROCEDURE — 74011250637 HC RX REV CODE- 250/637: Performed by: FAMILY MEDICINE

## 2021-03-01 PROCEDURE — 74011250636 HC RX REV CODE- 250/636: Performed by: INTERNAL MEDICINE

## 2021-03-01 PROCEDURE — U0005 INFEC AGEN DETEC AMPLI PROBE: HCPCS

## 2021-03-01 PROCEDURE — 74011000258 HC RX REV CODE- 258: Performed by: INTERNAL MEDICINE

## 2021-03-01 PROCEDURE — 74011250637 HC RX REV CODE- 250/637: Performed by: INTERNAL MEDICINE

## 2021-03-01 PROCEDURE — 36415 COLL VENOUS BLD VENIPUNCTURE: CPT

## 2021-03-01 PROCEDURE — 0202U NFCT DS 22 TRGT SARS-COV-2: CPT

## 2021-03-01 PROCEDURE — 74011000258 HC RX REV CODE- 258: Performed by: FAMILY MEDICINE

## 2021-03-01 PROCEDURE — 74011250636 HC RX REV CODE- 250/636: Performed by: NURSE PRACTITIONER

## 2021-03-01 PROCEDURE — 74011250636 HC RX REV CODE- 250/636: Performed by: FAMILY MEDICINE

## 2021-03-01 PROCEDURE — 80053 COMPREHEN METABOLIC PANEL: CPT

## 2021-03-01 PROCEDURE — 85025 COMPLETE CBC W/AUTO DIFF WBC: CPT

## 2021-03-01 PROCEDURE — 65660000001 HC RM ICU INTERMED STEPDOWN

## 2021-03-01 PROCEDURE — 84484 ASSAY OF TROPONIN QUANT: CPT

## 2021-03-01 RX ORDER — KETOROLAC TROMETHAMINE 30 MG/ML
15 INJECTION, SOLUTION INTRAMUSCULAR; INTRAVENOUS
Status: DISCONTINUED | OUTPATIENT
Start: 2021-03-01 | End: 2021-03-06

## 2021-03-01 RX ORDER — LORAZEPAM 2 MG/ML
2 INJECTION INTRAMUSCULAR
Status: DISCONTINUED | OUTPATIENT
Start: 2021-03-01 | End: 2021-03-02

## 2021-03-01 RX ORDER — BUPRENORPHINE HYDROCHLORIDE AND NALOXONE HYDROCHLORIDE DIHYDRATE 8; 2 MG/1; MG/1
1 TABLET SUBLINGUAL DAILY
Status: DISCONTINUED | OUTPATIENT
Start: 2021-03-01 | End: 2021-03-01 | Stop reason: DRUGHIGH

## 2021-03-01 RX ORDER — BUPRENORPHINE HYDROCHLORIDE AND NALOXONE HYDROCHLORIDE DIHYDRATE 8; 2 MG/1; MG/1
1 TABLET SUBLINGUAL 2 TIMES DAILY
Status: DISCONTINUED | OUTPATIENT
Start: 2021-03-01 | End: 2021-03-17 | Stop reason: HOSPADM

## 2021-03-01 RX ORDER — POTASSIUM CHLORIDE 7.45 MG/ML
10 INJECTION INTRAVENOUS
Status: COMPLETED | OUTPATIENT
Start: 2021-03-01 | End: 2021-03-01

## 2021-03-01 RX ORDER — ALPRAZOLAM 0.25 MG/1
1 TABLET ORAL
Status: DISCONTINUED | OUTPATIENT
Start: 2021-03-01 | End: 2021-03-02

## 2021-03-01 RX ADMIN — PIPERACILLIN AND TAZOBACTAM 3.38 G: 3; .375 INJECTION, POWDER, LYOPHILIZED, FOR SOLUTION INTRAVENOUS at 11:28

## 2021-03-01 RX ADMIN — POTASSIUM CHLORIDE 10 MEQ: 7.46 INJECTION, SOLUTION INTRAVENOUS at 12:30

## 2021-03-01 RX ADMIN — LORAZEPAM 2 MG: 2 INJECTION INTRAMUSCULAR; INTRAVENOUS at 00:53

## 2021-03-01 RX ADMIN — ENOXAPARIN SODIUM 40 MG: 40 INJECTION SUBCUTANEOUS at 08:57

## 2021-03-01 RX ADMIN — PIPERACILLIN AND TAZOBACTAM 3.38 G: 3; .375 INJECTION, POWDER, LYOPHILIZED, FOR SOLUTION INTRAVENOUS at 19:11

## 2021-03-01 RX ADMIN — LORAZEPAM 2 MG: 2 INJECTION INTRAMUSCULAR; INTRAVENOUS at 14:18

## 2021-03-01 RX ADMIN — VANCOMYCIN HYDROCHLORIDE 1000 MG: 1 INJECTION, POWDER, LYOPHILIZED, FOR SOLUTION INTRAVENOUS at 07:08

## 2021-03-01 RX ADMIN — Medication 10 ML: at 06:00

## 2021-03-01 RX ADMIN — METRONIDAZOLE 500 MG: 500 INJECTION, SOLUTION INTRAVENOUS at 11:11

## 2021-03-01 RX ADMIN — POTASSIUM CHLORIDE 10 MEQ: 7.46 INJECTION, SOLUTION INTRAVENOUS at 13:34

## 2021-03-01 RX ADMIN — ACETAMINOPHEN 650 MG: 325 TABLET ORAL at 08:58

## 2021-03-01 RX ADMIN — LORAZEPAM 2 MG: 2 INJECTION INTRAMUSCULAR; INTRAVENOUS at 18:00

## 2021-03-01 RX ADMIN — VANCOMYCIN HYDROCHLORIDE 1000 MG: 1 INJECTION, POWDER, LYOPHILIZED, FOR SOLUTION INTRAVENOUS at 11:35

## 2021-03-01 RX ADMIN — POTASSIUM CHLORIDE 10 MEQ: 7.46 INJECTION, SOLUTION INTRAVENOUS at 14:18

## 2021-03-01 RX ADMIN — VANCOMYCIN HYDROCHLORIDE 1000 MG: 1 INJECTION, POWDER, LYOPHILIZED, FOR SOLUTION INTRAVENOUS at 20:32

## 2021-03-01 RX ADMIN — CEFTRIAXONE SODIUM 1 G: 1 INJECTION, POWDER, FOR SOLUTION INTRAMUSCULAR; INTRAVENOUS at 11:11

## 2021-03-01 RX ADMIN — LORAZEPAM 1 MG: 2 INJECTION INTRAMUSCULAR; INTRAVENOUS at 08:57

## 2021-03-01 RX ADMIN — ACETAMINOPHEN 650 MG: 325 TABLET ORAL at 01:06

## 2021-03-01 RX ADMIN — POTASSIUM CHLORIDE 10 MEQ: 7.46 INJECTION, SOLUTION INTRAVENOUS at 11:28

## 2021-03-01 RX ADMIN — KETOROLAC TROMETHAMINE 15 MG: 30 INJECTION, SOLUTION INTRAMUSCULAR at 21:07

## 2021-03-01 RX ADMIN — POTASSIUM CHLORIDE AND SODIUM CHLORIDE: 900; 150 INJECTION, SOLUTION INTRAVENOUS at 11:28

## 2021-03-01 RX ADMIN — LORAZEPAM 1 MG: 2 INJECTION INTRAMUSCULAR; INTRAVENOUS at 01:03

## 2021-03-01 NOTE — ED PROVIDER NOTES
Patient is a 56-year-old female history of anxiety, hepatitis C and IV drug use (heroin) presenting today with altered mental status and fatigue with generalized pain. She is a very poor historian due to fatigue. Her mom is at bedside and provides history. 4 days of generalized body pain, somnolence, trouble breathing, chest pain and fevers as high as 101 Fahrenheit. She last used IV drug yesterday. Per chart review she was seen earlier today at [facility].  She had a negative CT head. Lab work showed thrombocytopenia at 70, renal function okay, elevated troponin. She was to be admitted however patient left AMA. She says that she did not get along with the physicians there. She already received ceftriaxone and blood cultures were sent. Past Medical History:   Diagnosis Date    Aggressive outburst     Anxiety disorder     Hepatitis C     IV drug abuse ([facility] [address]        History reviewed. No pertinent surgical history. History reviewed. No pertinent family history.     Social History     Socioeconomic History    Marital status: SINGLE     Spouse name: Not on file    Number of children: Not on file    Years of education: Not on file    Highest education level: Not on file   Occupational History    Not on file   Social Needs    Financial resource strain: Patient refused    Food insecurity     Worry: Patient refused     Inability: Patient refused    Transportation needs     Medical: Patient refused     Non-medical: Patient refused   Tobacco Use    Smoking status: Former Smoker     Types: Cigarettes    Smokeless tobacco: Never Used    Tobacco comment: pt refused   Substance and Sexual Activity    Alcohol use: Not Currently    Drug use: Yes     Types: Marijuana, Heroin     Comment: positive for amphetamines in ED    Sexual activity: Not Currently   Lifestyle    Physical activity     Days per week: Patient refused     Minutes per session: Patient refused    Stress: Very much Relationships    Social connections     Talks on phone: Patient refused     Gets together: Patient refused     Attends Anabaptist service: Patient refused     Active member of club or organization: Patient refused     Attends meetings of clubs or organizations: Patient refused     Relationship status: Patient refused    Intimate partner violence     Fear of current or ex partner: Patient refused     Emotionally abused: Patient refused     Physically abused: Patient refused     Forced sexual activity: Patient refused   Other Topics Concern     Service Not Asked    Blood Transfusions Not Asked    Caffeine Concern Not Asked    Occupational Exposure Not Asked    Hobby Hazards Not Asked    Sleep Concern Not Asked    Stress Concern Not Asked    Weight Concern Not Asked    Special Diet Yes    Back Care Not Asked    Exercise Not Asked    Bike Helmet Not Asked   [address] [facility] Not Asked    Self-Exams Not Asked   Social History Narrative    Not on file         ALLERGIES: Patient has no known allergies. Review of Systems   Constitutional: Positive for chills and fever. HENT: Negative for congestion and rhinorrhea. Eyes: Negative for redness and visual disturbance. Respiratory: Positive for shortness of breath. Negative for cough. Cardiovascular: Positive for chest pain. Negative for leg swelling. Gastrointestinal: Positive for abdominal pain. Negative for diarrhea, nausea and vomiting. Genitourinary: Negative for dysuria, flank pain, frequency, hematuria and urgency. Musculoskeletal: Positive for arthralgias, back pain and myalgias. Negative for neck pain. Skin: Negative for rash and wound. Allergic/Immunologic: Negative for immunocompromised state. Neurological: Negative for dizziness and headaches. Psychiatric/Behavioral: Positive for confusion.        Vitals:    02/28/21 1734   BP: 121/82   Pulse: (!) 133   Resp: 28   Temp: 98.8 °F (37.1 °C)   SpO2: 95%   Weight: 59 kg (130 lb)   Height: 5' 6\" (1.676 m)            Physical Exam  Vitals signs and nursing note reviewed. Constitutional:       General: She is in acute distress. Appearance: She is well-developed. She is ill-appearing. She is not diaphoretic. Comments: Somnolent but wakes easily  Cachectic   HENT:      Head: Normocephalic. Mouth/Throat:      Pharynx: No oropharyngeal exudate. Eyes:      General: Scleral icterus present. Right eye: No discharge. Left eye: No discharge. Pupils: Pupils are equal, round, and reactive to light. Neck:      Musculoskeletal: Normal range of motion and neck supple. No neck rigidity or pain with movement. Cardiovascular:      Rate and Rhythm: Regular rhythm. Tachycardia present. Pulses: Normal pulses. Heart sounds: Normal heart sounds. No murmur. No friction rub. No gallop. Pulmonary:      Effort: Pulmonary effort is normal. No respiratory distress. Breath sounds: Normal breath sounds. No stridor. No wheezing or rales. Abdominal:      General: Bowel sounds are normal. There is no distension. Palpations: Abdomen is soft. Tenderness: There is no abdominal tenderness. There is no guarding or rebound. Musculoskeletal: Normal range of motion. General: No deformity. Skin:     General: Skin is warm and dry. Capillary Refill: Capillary refill takes less than 2 seconds. Findings: No rash. Comments: Track marks on extremities  Questionable Osler's nodes to index fingers bilaterally. no splinter hemorrhages. Neurological:      Mental Status: She is alert and oriented to person, place, and time.       Comments: Moves all 4 extremities   Psychiatric:      Comments: Anxious       Labs Reviewed:   UDS positive for amphetamines and THC  UA appears infected  Leukocytosis, thrombocytopenia, left shift  Hypokalemic with normal renal function  Alk phos elevated  Mild hyperbilirubinemia  Ammonia okay  Troponin elevated 0.18 which is up trending  Lactic acid normal    Imaging Reviewed:   CTA without septic emboli      Course:  After my evaluation of pt I ordered IVF and vancomycin (already received ctx and had blood cx sent earlier today). 9:29 PM RR, HR, thrombocytopenia--code sepsis called. Lactic ok, bp ok therefore doesn't require 30ml/kg IVF. abx given already. cx sent this AM.     Perfect Serve Consult for Admission  9:48 PM    ED Room Number: ER12/12  Patient Name and age:  Alix Salgado 31 y.o.  female  Working Diagnosis:   1. Thrombocytopenia ([facility] [address]    2. Altered mental status, unspecified altered mental status type    3. Urinary tract infection with hematuria, site unspecified    4. IV drug abuse ([facility] [address]    5. Hypokalemia    6. Chronic hepatitis C without hepatic coma ([facility] [address]        COVID-19 Suspicion:  no  Sepsis present:  yes  Reassessment needed: no  Code Status:  Full Code  Readmission: no  Isolation Requirements:  no  Recommended Level of Care:  telemetry  Department:[facility] Adult ED - 21   Other:  31 y.o. female hx of IVDA here today with myalgias, fatigue, sob/cp, +trop, neg CT PE/septic emboli. Left AMA from other hospital today--got ctx, I added on vanco. cx sent earlier. Tachycardic, bp ok. ?ITP. MDM:  Patient is a 50-year-old female history of IV drug abuse presenting today with generalized myalgias, fatigue, altered mental status and fevers. She was seen at another hospital earlier today for the same but left [address] She received ceftriaxone at that time. Here she arrives with elevated white blood cell count, elevated troponin and hypokalemia with inflammatory markers also elevated. My initial concern was for septic emboli/endocarditis so CTA was ordered. No emboli seen on CTA. She was started on vancomycin to supplement the ceftriaxone she already received. Blood cultures had already been sent prior to arrival.  She has no nuchal rigidity to suggest meningitis.   She does have thrombocytopenia therefore may have possible ITP in the setting of hepatitis C. She does have a UTI which should be covered by the ceftriaxone she received. She will be admitted for further management and work-up. Clinical Impression:     ICD-10-CM ICD-9-CM    1. Thrombocytopenia (HCC)  D69.6 287.5    2. Altered mental status, unspecified altered mental status type  R41.82 780.97    3. Urinary tract infection with hematuria, site unspecified  N39.0 599.0     R31.9 599.70    4. IV drug abuse ([facility] [address]  F19.10 305.90    5. Hypokalemia  E87.6 276.8    6. Chronic hepatitis C without hepatic coma (HCC)  B18.2 070.54            Disposition: admit  Geoffrey Arevalo DO      Total critical care time spent exclusive of procedures:  40  Due to a high probability of clinically significant, life threatening deterioration, the patient required my highest level of preparedness to intervene emergently and I personally spent this critical care time directly and personally managing the patient. This critical care time included obtaining a history; examining the patient; pulse oximetry; ordering and review of studies; arranging urgent treatment with development of a management plan; evaluation of patient's response to treatment; frequent reassessment; and, discussions with other providers. This critical care time was performed to assess and manage the high probability of imminent, life-threatening deterioration that could result in multi-organ failure. It was exclusive of separately billable procedures and treating other patients and teaching time.

## 2021-03-01 NOTE — PROGRESS NOTES
0730: Bedside and Verbal shift change report given to Fiona Rice RN (oncoming nurse) by Adriana Motley RN (offgoing nurse). Report included the following information SBAR, Kardex, ED Summary, Procedure Summary, Intake/Output, MAR, Recent Results, Med Rec Status, Cardiac Rhythm ST, Alarm Parameters  and Dual Neuro Assessment. 0800: Resumed pt care, VSS, no pain. Pt confused, not answering questions except thrashing/screaming when attempting to evaluate patient. Family outside of room, charge nurse & night shift RN to speak with family about COVID precautions and no visitors allowed until negative test is present. Purse full of pills without patients name returned to family to take home. Bed alarm in place   Drips: NS/K+ @ 100/hr     0900: Large episode of bowel incontinence, full CHG provided, brief placed. x2 PIV's inserted. PRN ativan given for agitation/restlessness. 1200: Large episode of bowel incontinence, full CHG provided, brief replaced. During change pt extremely agitated/thrashing around/screaming, trying to hit/kick staff, unable to be redirected. 1300: Orders for PRN suboxone/xanax. Dr. Pelon Wills notified of positive blood cultures, consult placed for Infectious Disease     1400: Pt ripping off all clothing/leads/etc, another episodes of bowel incontinence. Orders for restraints and PRN ativan. 1415: PRN ativan given for +2 agitation. 1600: 6+ loose bowel movements in past 6 hours, orders to place flexiseal     Assigned nurse, Joana Nichols, and verifying nurse, Merlinda Sato, verify a provider order for the Fecal Management System is present and have reviewed the indications and contraindications confirming it is appropriate to proceed with insertion of the Fecal Management System. 1700: Orders to send COVID PCR     1730: PCR sent    1800: PRN ativan given for 2+ agitation     2000: Bedside and Verbal shift change report given to Luna Harvey (oncoming nurse) by Fiona Rice RN (offgoing nurse). Report included the following information SBAR, Kardex, Procedure Summary, Intake/Output, MAR, Recent Results, Med Rec Status, Cardiac Rhythm ST, Alarm Parameters  and Dual Neuro Assessment.

## 2021-03-01 NOTE — PROGRESS NOTES
Clinical Pharmacy Note: Metronidazole Dosing    Please note that the metronidazole dose for Brisa Aguilera has been changed to 500 mg IV q12h per [facility]-approved protocol. Please contact the pharmacy with any questions.     [address], [place]

## 2021-03-01 NOTE — PROGRESS NOTES
Verbal shift change report given to Bhumika Beth (oncoming nurse) by Miguel Fournier RN (offgoing nurse). Report included the following information SBAR, Kardex, ED Summary, Procedure Summary, Intake/Output, MAR, Accordion, Recent Results, Med Rec Status and Cardiac Rhythm ST.         DUAL SKIN  Primary Nurse Awais Patel and BELKIS Del Valle performed a dual skin assessment on this patient Impairment noted- see wound doc flow sheet  Terrance score is 16        TRANSFER - IN REPORT:    Verbal report received from Shari Smith, [address] [place] [address] (name) on Dominga Wade  being received from ED (unit) for routine progression of care      Report consisted of patients Situation, Background, Assessment and   Recommendations(SBAR). Information from the following report(s) SBAR, Kardex, ED Summary, Intake/Output, MAR, Accordion, Recent Results, Med Rec Status and Cardiac Rhythm ST was reviewed with the receiving nurse. Opportunity for questions and clarification was provided. Assessment completed upon patients arrival to unit and care assumed.

## 2021-03-01 NOTE — ROUTINE PROCESS
TRANSFER - OUT REPORT:    Verbal report given to [address] RN(name) on [facility]  being transferred to [facility] (unit) for routine progression of care       Report consisted of patients Situation, Background, Assessment and   Recommendations(SBAR). Information from the following report(s) SBAR, Kardex, ED Summary, Intake/Output, MAR and Recent Results was reviewed with the receiving nurse. Lines:   Peripheral IV 02/28/21 Left Antecubital (Active)        Opportunity for questions and clarification was provided.       Patient transported with:   Registered Nurse

## 2021-03-01 NOTE — H&P
Hospitalist Admission Note    NAME: Quentin Pathak   :  1990   MRN:  [id number]     Date/Time:  2021 11:23 PM    Patient PCP: Unknown, Provider  ______________________________________________________________________  Given the patient's current clinical presentation, I have a high level of concern for decompensation if discharged from the emergency department. Complex decision making was performed, which includes reviewing the patient's available past medical records, laboratory results, and x-ray films. My assessment of this patient's clinical condition and my plan of care is as follows. Assessment / Plan:  Patient 69-year-old female admitted for severe sepsis    1. Severe sepsis evidenced by leukocytosis, tachycardia, fever, elevated troponin: We will admit to the stepdown, fluid resuscitation, broad-spectrum antibiotic, strict intake output. Keep patient n.p.o. at this time. Cycle troponin. Follow blood cultures. Pharmacy will dose antibiotics. Empiric treatment of aspiration pneumonia as evidenced on CT of chest.  Consult ID  2. History of IV drug abuse: No detailed information provided by mother and patient unable to provide information due to altered mental status, history of heroin and amphetamine abuse. Symptomatic management. Lorazepam as needed for agitation  Code Status: Full code  Surrogate Decision Maker:    DVT Prophylaxis: Lovenox  GI Prophylaxis: not indicated    Baseline: Lives with her mother. She has been bedridden for last 3 to 4 days with fever and myalgia      Subjective:   CHIEF COMPLAINT: Altered mental status myalgia and fever    HISTORY OF PRESENT ILLNESS:     Thony Garcia is a 31 y.o.  female who presents with above symptoms. Started suddenly. Progressively worse. Severe now. Patient with altered mental status not willing and unable to provide information. Mother reports she is adult but lives at her house.   Not sure when she used any type of IV drugs last time. Possibly yesterday. Reports she has been dealing with IV drug abuse for many years. Apparently after an . Mother reports she has been multiple times in, due to narcotic overdose, reversed by nasal Narcan. She also has history of amphetamine use, unknown last use. Last 4 days unable to ambulate. She has been suffering from severe myalgia and fever occasional cough, negative for Covid no exposure. Mother reports she was negative as well. Patient and mother report, never had similar symptoms in the past.  She takes Suboxone and Xanax as needed for anxiety. Not have any other prescriptions. We were asked to admit for work up and evaluation of the above problems. Past Medical History:   Diagnosis Date    Aggressive outburst     Anxiety disorder     Hepatitis C     IV drug abuse ([facility] [address]         History reviewed. No pertinent surgical history. Social History     Tobacco Use    Smoking status: Former Smoker     Types: Cigarettes    Smokeless tobacco: Never Used    Tobacco comment: pt refused   Substance Use Topics    Alcohol use: Not Currently        History reviewed. No pertinent family history. No Known Allergies     Prior to Admission medications    Medication Sig Start Date End Date Taking? Authorizing Provider   cephALEXin (Keflex) 250 mg capsule Take 1 Cap by mouth three (3) times daily. 21   Babatunde Ross MD       REVIEW OF SYSTEMS:     I am not able to complete the review of systems because:    The patient is intubated and sedated    The patient has altered mental status due to his acute medical problems    The patient has baseline aphasia from prior stroke(s)    The patient has baseline dementia and is not reliable historian    The patient is in acute medical distress and unable to provide information           Total of 12 systems reviewed as follows:       POSITIVE= underlined text  Negative = text not underlined  General:  fever, chills, sweats, generalized weakness, weight loss/gain,      loss of appetite   Eyes:    blurred vision, eye pain, loss of vision, double vision  ENT:    rhinorrhea, pharyngitis   Respiratory:   cough, sputum production, SOB, FERREIRA, wheezing, pleuritic pain   Cardiology:   chest pain, palpitations, orthopnea, PND, edema, syncope   Gastrointestinal:  abdominal pain , N/V, diarrhea, dysphagia, constipation, bleeding   Genitourinary:  frequency, urgency, dysuria, hematuria, incontinence   Muskuloskeletal :  arthralgia, myalgia, back pain  Hematology:  easy bruising, nose or gum bleeding, lymphadenopathy   Dermatological: rash, ulceration, pruritis, color change / jaundice  Endocrine:   hot flashes or polydipsia   Neurological:  headache, dizziness, confusion, focal weakness, paresthesia,     Speech difficulties, memory loss, gait difficulty  Psychological: Feelings of anxiety, depression, agitation    Objective:   VITALS:    Visit Vitals  /77   Pulse (!) 131   Temp 99.4 °F (37.4 °C)   Resp (!) 32   Ht 5' 6\" (1.676 m)   Wt 59 kg (130 lb)   SpO2 98%   BMI 20.98 kg/m²       PHYSICAL EXAM:    General:    Opens eyes, ill looking patient. Dilated pupils. Septic. HEENT: Atraumatic, anicteric sclerae, pink conjunctivae     No oral ulcers, mucosa moist, throat clear, dentition fair  Neck:  Supple, symmetrical,  thyroid: non tender  Lungs:   Clear to auscultation bilaterally. No Wheezing or Rhonchi. No rales. Chest wall: Tender due to myalgia. No Accessory muscle use. Heart:   Regular  rhythm tachycardic 130s,  No  murmur   No edema  Abdomen:   Soft, tender all over. Not distended. Bowel sounds normal  Extremities: No cyanosis. No clubbing,      Skin turgor normal, Capillary refill normal, Radial dial pulse 2+  Skin:     Not pale. Not Jaundiced  No rashes   Psych:  Anxious  Neurologic: Lethargic. EOMs intact. No facial asymmetry. No aphasia or slurred speech. Symmetrical strength, Sensation grossly intact. _______________________________________________________________________  Care Plan discussed with:    Comments   Patient     Family      RN     Care Manager                    Consultant:      _______________________________________________________________________  Expected  Disposition:   Home with Family    HH/PT/OT/RN    SNF/LTC    BE    ________________________________________________________________________  TOTAL TIME: 36 Minutes    Critical Care Provided     Minutes non procedure based      Comments     Reviewed previous records   >50% of visit spent in counseling and coordination of care  Discussion with patient and/or family and questions answered       ________________________________________________________________________  Signed: Angela Perry MD    Procedures: see electronic medical records for all procedures/Xrays and details which were not copied into this note but were reviewed prior to creation of Plan. LAB DATA REVIEWED:    Recent Results (from the past 24 hour(s))   CBC WITH AUTOMATED DIFF    Collection Time: 02/28/21  6:00 AM   Result Value Ref Range    WBC 10.7 3.6 - 11.0 K/uL    RBC 4.21 3.80 - 5.20 M/uL    HGB 12.4 11.5 - 16.0 g/dL    HCT 36.2 35.0 - 47.0 %    MCV 86.0 80.0 - 99.0 FL    MCH 29.5 26.0 - 34.0 PG    MCHC 34.3 30.0 - 36.5 g/dL    RDW 13.2 11.5 - 14.5 %    PLATELET 72 (L) 753 - 400 K/uL    MPV 10.5 8.9 - 12.9 FL    NEUTROPHILS 89 (H) 32 - 75 %    LYMPHOCYTES 5 (L) 12 - 49 %    MONOCYTES 6 5 - 13 %    EOSINOPHILS 0 0 - 7 %    BASOPHILS 0 0 - 1 %    IMMATURE GRANULOCYTES 0 0.0 - 0.5 %    ABS. NEUTROPHILS 9.5 (H) 1.8 - 8.0 K/UL    ABS. LYMPHOCYTES 0.5 (L) 0.8 - 3.5 K/UL    ABS. MONOCYTES 0.6 0.0 - 1.0 K/UL    ABS. EOSINOPHILS 0.0 0.0 - 0.4 K/UL    ABS. BASOPHILS 0.0 0.0 - 0.1 K/UL    ABS. IMM.  GRANS. 0.0 0.00 - 0.04 K/UL    DF AUTOMATED     METABOLIC PANEL, COMPREHENSIVE    Collection Time: 02/28/21  6:00 AM   Result Value Ref Range    Sodium 136 136 - 145 mmol/L Potassium 2.8 (L) 3.5 - 5.1 mmol/L    Chloride 100 97 - 108 mmol/L    CO2 26 21 - 32 mmol/L    Anion gap 10 5 - 15 mmol/L    Glucose 151 (H) 65 - 100 mg/dL    BUN 22 (H) 6 - 20 mg/dL    Creatinine 0.55 0.55 - 1.02 mg/dL    BUN/Creatinine ratio 40 (H) 12 - 20      GFR est AA >60 >60 ml/min/1.73m2    GFR est non-AA >60 >60 ml/min/1.73m2    Calcium 9.2 8.5 - 10.1 mg/dL    Bilirubin, total 1.4 (H) 0.2 - 1.0 mg/dL    AST (SGOT) 33 15 - 37 U/L    ALT (SGPT) 54 12 - 78 U/L    Alk.  phosphatase 204 (H) 45 - 117 U/L    Protein, total 7.0 6.4 - 8.2 g/dL    Albumin 2.9 (L) 3.5 - 5.0 g/dL    Globulin 4.1 (H) 2.0 - 4.0 g/dL    A-G Ratio 0.7 (L) 1.1 - 2.2     HCG QL SERUM    Collection Time: 02/28/21  6:00 AM   Result Value Ref Range    HCG, Ql. Negative Negative     URINALYSIS W/ REFLEX CULTURE    Collection Time: 02/28/21  6:00 AM    Specimen: Urine   Result Value Ref Range    Color Paige      Appearance Turbid (A) Clear      Specific gravity 1.016 1.003 - 1.030      pH (UA) 6.0 5.0 - 8.0      Protein 100 (A) Negative mg/dL    Glucose Negative Negative mg/dL    Ketone 5 (A) Negative mg/dL    Bilirubin Negative Negative      Blood Large (A) Negative      Urobilinogen 4.0 (H) 0.1 - 1.0 EU/dL    Nitrites Positive (A) Negative      Leukocyte Esterase Trace (A) Negative      UA:UC IF INDICATED Culture not indicated by UA result Culture not indicated by UA result      WBC 5-10 0 - 4 /hpf    RBC 20-50 0 - 5 /hpf    Bacteria Negative Negative /hpf   DRUG SCREEN, URINE    Collection Time: 02/28/21  6:00 AM   Result Value Ref Range    AMPHETAMINES Positive (A) Negative      BARBITURATES Negative Negative      BENZODIAZEPINES Negative Negative      COCAINE Negative Negative      METHADONE Negative Negative      OPIATES Negative Negative      PCP(PHENCYCLIDINE) Negative Negative      THC (TH-CANNABINOL) Positive (A) Negative      Drug screen comment        This test is a screen for drugs of abuse in a medical setting only (i.e., they are unconfirmed results and as such must not be used for non-medical purposes, e.g.,employment testing, legal testing). Due to its inherent nature, false positive (FP) and false negative (FN) results may be obtained. Therefore, if necessary for medical care, recommend confirmation of positive findings by GC/MS.    ETHYL ALCOHOL    Collection Time: 02/28/21  6:00 AM   Result Value Ref Range    ALCOHOL(ETHYL),SERUM <4 <10 mg/dL   TROPONIN I    Collection Time: 02/28/21  8:00 AM   Result Value Ref Range    Troponin-I, Qt. 0.14 (H) <0.05 ng/mL   CK    Collection Time: 02/28/21  8:00 AM   Result Value Ref Range     26 - 192 U/L   INFLUENZA A & B AG (RAPID TEST)    Collection Time: 02/28/21 10:50 AM   Result Value Ref Range    Influenza A Antigen Negative Negative      Influenza B Antigen Negative Negative     SARS-COV-2    Collection Time: 02/28/21 11:00 AM   Result Value Ref Range    SARS-CoV-2 Nasopharyngeal     COVID-19 RAPID TEST    Collection Time: 02/28/21 11:00 AM   Result Value Ref Range    Specimen source Nasopharyngeal      COVID-19 rapid test Not Detected Not Detected     DRUG SCREEN, URINE    Collection Time: 02/28/21  6:12 PM   Result Value Ref Range    AMPHETAMINES Positive (A) NEG      BARBITURATES Negative NEG      BENZODIAZEPINES Negative NEG      COCAINE Negative NEG      METHADONE Negative NEG      OPIATES Negative NEG      PCP(PHENCYCLIDINE) Negative NEG      THC (TH-CANNABINOL) Positive (A) NEG      Drug screen comment (NOTE)    URINALYSIS W/MICROSCOPIC    Collection Time: 02/28/21  6:12 PM   Result Value Ref Range    Color DARK YELLOW      Appearance CLEAR CLEAR      Specific gravity 1.020 1.003 - 1.030      pH (UA) 6.5 5.0 - 8.0      Protein 100 (A) NEG mg/dL    Glucose Negative NEG mg/dL    Ketone 15 (A) NEG mg/dL    Blood LARGE (A) NEG      Urobilinogen 2.0 (H) 0.2 - 1.0 EU/dL    Nitrites Positive (A) NEG      Leukocyte Esterase MODERATE (A) NEG      WBC 20-50 0 - 4 /hpf    RBC >100 (H) 0 - 5 /hpf    Epithelial cells FEW FEW /lpf    Bacteria 1+ (A) NEG /hpf    Hyaline cast 0-2 0 - 5 /lpf   URINE CULTURE HOLD SAMPLE    Collection Time: 02/28/21  6:12 PM    Specimen: Serum; Urine   Result Value Ref Range    Urine culture hold        Urine on hold in Microbiology dept for 2 days. If unpreserved urine is submitted, it cannot be used for addtional testing after 24 hours, recollection will be required. BILIRUBIN, CONFIRM    Collection Time: 02/28/21  6:12 PM   Result Value Ref Range    Bilirubin UA, confirm Negative NEG     HCG URINE, QL. - POC    Collection Time: 02/28/21  6:20 PM   Result Value Ref Range    Pregnancy test,urine (POC) Negative NEG     SAMPLES BEING HELD    Collection Time: 02/28/21  8:34 PM   Result Value Ref Range    SAMPLES BEING HELD 1RED     COMMENT        Add-on orders for these samples will be processed based on acceptable specimen integrity and analyte stability, which may vary by analyte. CBC WITH AUTOMATED DIFF    Collection Time: 02/28/21  8:34 PM   Result Value Ref Range    WBC 13.4 (H) 3.6 - 11.0 K/uL    RBC 4.48 3.80 - 5.20 M/uL    HGB 13.1 11.5 - 16.0 g/dL    HCT 37.1 35.0 - 47.0 %    MCV 82.8 80.0 - 99.0 FL    MCH 29.2 26.0 - 34.0 PG    MCHC 35.3 30.0 - 36.5 g/dL    RDW 13.2 11.5 - 14.5 %    PLATELET 61 (L) 559 - 400 K/uL    MPV 10.6 8.9 - 12.9 FL    NRBC 0.0 0  WBC    ABSOLUTE NRBC 0.00 0.00 - 0.01 K/uL    NEUTROPHILS 96 (H) 32 - 75 %    LYMPHOCYTES 3 (L) 12 - 49 %    MONOCYTES 1 (L) 5 - 13 %    EOSINOPHILS 0 0 - 7 %    BASOPHILS 0 0 - 1 %    IMMATURE GRANULOCYTES 0 %    ABS. NEUTROPHILS 12.9 (H) 1.8 - 8.0 K/UL    ABS. LYMPHOCYTES 0.4 (L) 0.8 - 3.5 K/UL    ABS. MONOCYTES 0.1 0.0 - 1.0 K/UL    ABS. EOSINOPHILS 0.0 0.0 - 0.4 K/UL    ABS. BASOPHILS 0.0 0.0 - 0.1 K/UL    ABS. IMM.  GRANS. 0.0 K/UL    DF MANUAL      RBC COMMENTS NORMOCYTIC, NORMOCHROMIC     METABOLIC PANEL, COMPREHENSIVE    Collection Time: 02/28/21  8:34 PM   Result Value Ref Range    Sodium 139 136 - 145 mmol/L    Potassium 3.0 (L) 3.5 - 5.1 mmol/L    Chloride 106 97 - 108 mmol/L    CO2 24 21 - 32 mmol/L    Anion gap 9 5 - 15 mmol/L    Glucose 142 (H) 65 - 100 mg/dL    BUN 20 6 - 20 MG/DL    Creatinine 0.55 0.55 - 1.02 MG/DL    BUN/Creatinine ratio 36 (H) 12 - 20      GFR est AA >60 >60 ml/min/1.73m2    GFR est non-AA >60 >60 ml/min/1.73m2    Calcium 8.9 8.5 - 10.1 MG/DL    Bilirubin, total 2.3 (H) 0.2 - 1.0 MG/DL    ALT (SGPT) 44 12 - 78 U/L    AST (SGOT) 35 15 - 37 U/L    Alk. phosphatase 202 (H) 45 - 117 U/L    Protein, total 6.8 6.4 - 8.2 g/dL    Albumin 2.6 (L) 3.5 - 5.0 g/dL    Globulin 4.2 (H) 2.0 - 4.0 g/dL    A-G Ratio 0.6 (L) 1.1 - 2.2     TROPONIN I    Collection Time: 02/28/21  8:34 PM   Result Value Ref Range    Troponin-I, Qt. 0.18 (H) <0.05 ng/mL   AMMONIA    Collection Time: 02/28/21  8:34 PM   Result Value Ref Range    Ammonia <10 <32 UMOL/L   SED RATE (ESR)    Collection Time: 02/28/21  8:34 PM   Result Value Ref Range    Sed rate, automated 41 (H) 0 - 20 mm/hr   C REACTIVE PROTEIN, QT    Collection Time: 02/28/21  8:34 PM   Result Value Ref Range    C-Reactive protein 32.50 (H) 0.00 - 0.60 mg/dL   LACTIC ACID    Collection Time: 02/28/21  8:34 PM   Result Value Ref Range    Lactic acid 1.8 0.4 - 2.0 MMOL/L   EKG, 12 LEAD, INITIAL    Collection Time: 02/28/21  8:52 PM   Result Value Ref Range    Ventricular Rate 130 BPM    Atrial Rate 130 BPM    P-R Interval 138 ms    QRS Duration 92 ms    Q-T Interval 322 ms    QTC Calculation (Bezet) 473 ms    Calculated P Axis 75 degrees    Calculated R Axis 91 degrees    Calculated T Axis 53 degrees    Diagnosis Sinus tachycardia  No previous ECGs available      SAMPLES BEING HELD    Collection Time: 02/28/21 10:25 PM   Result Value Ref Range    SAMPLES BEING HELD  1 blue     COMMENT        Add-on orders for these samples will be processed based on acceptable specimen integrity and analyte stability, which may vary by analyte.

## 2021-03-01 NOTE — PROGRESS NOTES
[facility]                                                                                          Hospitalist Progress Note  Dona York MD  Answering service: [phone] from in house phone        Date of Service:  3/1/2021  NAME:  Puja Casiano  :  1990  MRN:  [id number]      Please note that this dictation was completed with [employer], the computer voice recognition software. Quite often unanticipated grammatical, syntax, homophones, and other interpretive errors are inadvertently transcribed by the computer software. Please disregard these errors. Please excuse any errors that have escaped final proofreading. Admission Summary:   Raven Valera is a 31 y.o.  female who presents with above symptoms. Started suddenly. Progressively worse. Severe now. Patient with altered mental status not willing and unable to provide information. Mother reports she is adult but lives at her house. Not sure when she used any type of IV drugs last time. Possibly yesterday. Reports she has been dealing with IV drug abuse for many years. Apparently after an . Mother reports she has been multiple times in, due to narcotic overdose, reversed by nasal Narcan. She also has history of amphetamine use, unknown last use. Last 4 days unable to ambulate. She has been suffering from severe myalgia and fever occasional cough, negative for Covid no exposure. Mother reports she was negative as well. Patient and mother report, never had similar symptoms in the past.  She takes Suboxone and Xanax as needed for anxiety. Not have any other prescriptions.       Interval history / Subjective:   Patient seen and examined today. Labs imaging and chart reviewed. Patient is agitated and thrashing in the bed. She does respond to when I am talking to her but does not open her eyes. She looks flushed. No family present bedside.        Assessment & Plan: Sepsis due to bacteremia POA   With evidence of GPC bacteremia 4 x 4, and also the presence of leukocytosis with elevated CRP and tachycardia  Troponins elevated as well but flat  IV drug abuser and so my suspicion for endocarditis is very high at this point of time  Started on vancomycin and Zosyn, 2D echo ordered to look for vegetations  Repeat blood cultures tomorrow  Infectious diseases consulted    Altered mental status  Suspected metabolic encephalopathy, also there might be contribution from withdrawal  But cannot rule out any septic emboli in the brain  CT of the head without any acute abnormality. Once that she is a little bit more stable we will get MRI of the brain done      Possibility of her benzo withdrawal.  And/or amphetamine overdose  Started patient back on Xanax and her Suboxone  Symptomatic treatment needed      Hypokalemia  IV fluids with potassium       N.p.o.  CT scan shows a possibility of aspiration  She is not awake and alert to eat at this point of time      Thrombocytopenia  Hold Lovenox    Body mass index is 19.66 kg/m². History of hepatitis C. Mother aware of the admission     Talked to dad on phone, [phone]. Code status: FULL  DVT prophylaxis: scd     Care Plan discussed with: Patient/Family  Anticipated Disposition: Home w/Family  Anticipated Discharge: Greater than 48 hours     Hospital Problems  Never Reviewed          Codes Class Noted POA    [facility] ICD-10-CM: A41.9  ICD-9-CM: 038.9, 995.91  2/28/2021 Unknown                Review of Systems:   A comprehensive review of systems was negative except for that written in the HPI. Vital Signs:    Last 24hrs VS reviewed since prior progress note.  Most recent are:  Visit Vitals  BP (!) 141/97 (BP 1 Location: Right upper arm, BP Patient Position: At rest)   Pulse (!) 131   Temp 99.8 °F (37.7 °C)   Resp (!) 35   Ht 5' 6\" (1.676 m)   Wt 55.2 kg (121 lb 12.8 oz)   SpO2 97%   BMI 19.66 kg/m²         Intake/Output Summary (Last 24 hours) at 3/1/2021 1317  Last data filed at 3/1/2021 1200  Gross per 24 hour   Intake 1450 ml   Output 350 ml   Net 1100 ml        Physical Examination:     I had a face to face encounter with this patient and independently examined them on 03/01/21 as outlined below:        Constitutional:  Patient to altered able to answer few questions, face flushed   ENT:  Oral mucosa moist, oropharynx benign.    Resp:  CTA bilaterally. No wheezing/rhonchi/rales. No accessory muscle use   CV:  Regular rhythm, normal rate, no murmurs, gallops, rubs    GI:  Soft, non distended, non tender. normoactive bowel sounds, no hepatosplenomegaly     Musculoskeletal:  Moving around in the bed    Neurologic:  Obtunded            Data Review:    Review and/or order of clinical lab test      Labs:     Recent Labs     03/01/21 0510 02/28/21 2034   WBC 14.5* 13.4*   HGB 11.1* 13.1   HCT 31.6* 37.1   PLT 56* 61*     Recent Labs     03/01/21  0510 02/28/21  2034 02/28/21  0600    139 136   K 3.0* 3.0* 2.8*   * 106 100   CO2 22 24 26   BUN 19 20 22*   CREA 0.45* 0.55 0.55   * 142* 151*   CA 8.8 8.9 9.2     Recent Labs     03/01/21  0510 02/28/21  2034 02/28/21  0600   ALT 34 44 54   * 202* 204*   TBILI 2.2* 2.3* 1.4*   TP 6.0* 6.8 7.0   ALB 2.1* 2.6* 2.9*   GLOB 3.9 4.2* 4.1*     No results for input(s): INR, PTP, APTT, INREXT in the last 72 hours.   No results for input(s): FE, TIBC, PSAT, FERR in the last 72 hours.   No results found for: FOL, RBCF   No results for input(s): PH, PCO2, PO2 in the last 72 hours.  Recent Labs     03/01/21  0510 02/28/21  2034 02/28/21  0800   CPK  --   --  131   TROIQ 0.18* 0.18* 0.14*     No results found for: CHOL, CHOLX, CHLST, CHOLV, HDL, HDLP, LDL, LDLC, DLDLP, TGLX, TRIGL, TRIGP, CHHD, CHHDX  No results found for: GLUCPOC  Lab Results   Component Value Date/Time    Color DARK YELLOW 02/28/2021 06:12 PM    Appearance CLEAR 02/28/2021 06:12 PM     Specific gravity 1.020 02/28/2021 06:12 PM    Specific gravity 1.016 02/28/2021 06:00 AM    pH (UA) 6.5 02/28/2021 06:12 PM    Protein 100 (A) 02/28/2021 06:12 PM    Glucose Negative 02/28/2021 06:12 PM    Ketone 15 (A) 02/28/2021 06:12 PM    Bilirubin Negative 02/28/2021 06:00 AM    Urobilinogen 2.0 (H) 02/28/2021 06:12 PM    Nitrites Positive (A) 02/28/2021 06:12 PM    Leukocyte Esterase MODERATE (A) 02/28/2021 06:12 PM    Epithelial cells FEW 02/28/2021 06:12 PM    Bacteria 1+ (A) 02/28/2021 06:12 PM    WBC 20-50 02/28/2021 06:12 PM    RBC >100 (H) 02/28/2021 06:12 PM         Medications Reviewed:     Current Facility-Administered Medications   Medication Dose Route Frequency    influenza vaccine 2020-21 (6 mos+)(PF) (FLUARIX/FLULAVAL/FLUZONE QUAD) injection 0.5 mL  0.5 mL IntraMUSCular PRIOR TO DISCHARGE    potassium chloride 10 mEq in 100 ml IVPB  10 mEq IntraVENous Q1H    piperacillin-tazobactam (ZOSYN) 3.375 g in 0.9% sodium chloride (MBP/ADV) 100 mL MBP  3.375 g IntraVENous Q8H    buprenorphine-naloxone (SUBOXONE) 8-2mg SL tablet  1 Tab SubLINGual DAILY    ALPRAZolam (XANAX) tablet 1 mg  1 mg Oral QID PRN    sodium chloride (NS) flush 5-40 mL  5-40 mL IntraVENous Q8H    sodium chloride (NS) flush 5-40 mL  5-40 mL IntraVENous PRN    acetaminophen (TYLENOL) tablet 650 mg  650 mg Oral Q6H PRN    Or    acetaminophen (TYLENOL) suppository 650 mg  650 mg Rectal Q6H PRN    polyethylene glycol (MIRALAX) packet 17 g  17 g Oral DAILY PRN    promethazine (PHENERGAN) tablet 12.5 mg  12.5 mg Oral Q6H PRN    Or    ondansetron (ZOFRAN) injection 4 mg  4 mg IntraVENous Q6H PRN    [Held by provider] enoxaparin (LOVENOX) injection 40 mg  40 mg SubCUTAneous DAILY    0.9% sodium chloride with KCl 20 mEq/L infusion   IntraVENous CONTINUOUS    Vancomycin- pharmacyto dose   Other Rx Dosing/Monitoring    vancomycin (VANCOCIN) 1,000 mg in 0.9% sodium chloride 250 mL (VIAL-MATE)  1,000 mg IntraVENous Q8H ______________________________________________________________________  EXPECTED LENGTH OF STAY: 4d 19h  ACTUAL LENGTH OF STAY:          1                 Ranjana Mckenzie MD

## 2021-03-01 NOTE — PROGRESS NOTES
Pharmacist Note - Vancomycin Dosing    Consult provided for this 31 y.o. female for indication of sepsis. -hx of IVDA    Recent Labs     21  0600   WBC 13.4* 10.7   CREA 0.55 0.55   BUN 20 22*     Frequency of BMP: daily x 3  Height: 168 cm  Weight: 59 kg  Est CrCl: >100 ml/min  Temp (24hrs), Av.2 °F (37.3 °C), Min:98.8 °F (37.1 °C), Max:99.8 °F (37.7 °C)    Goal trough = 15 - 20 mcg/mL    Therapy will be initiated with a loading dose of 1250 mg IV x 1 to be followed by a maintenance dose of 1000 mg IV every 8 hours. Pharmacy to follow patient daily and order levels / make dose adjustments as appropriate.

## 2021-03-02 ENCOUNTER — APPOINTMENT (OUTPATIENT)
Dept: CT IMAGING | Age: 31
DRG: 812 | End: 2021-03-02
Attending: INTERNAL MEDICINE
Payer: MEDICAID

## 2021-03-02 LAB
ALBUMIN SERPL-MCNC: 2.2 G/DL (ref 3.5–5)
ALBUMIN/GLOB SERPL: 0.5 {RATIO} (ref 1.1–2.2)
ALP SERPL-CCNC: 183 U/L (ref 45–117)
ALT SERPL-CCNC: 30 U/L (ref 12–78)
ANION GAP SERPL CALC-SCNC: 10 MMOL/L (ref 5–15)
ANION GAP SERPL CALC-SCNC: 11 MMOL/L (ref 5–15)
ARTERIAL PATENCY WRIST A: YES
AST SERPL-CCNC: 42 U/L (ref 15–37)
B PERT DNA SPEC QL NAA+PROBE: NOT DETECTED
BASE DEFICIT BLD-SCNC: 4 MMOL/L
BASOPHILS # BLD: 0 K/UL (ref 0–0.1)
BASOPHILS NFR BLD: 0 % (ref 0–1)
BDY SITE: ABNORMAL
BILIRUB SERPL-MCNC: 1.3 MG/DL (ref 0.2–1)
BORDETELLA PARAPERTUSSIS PCR, BORPAR: NOT DETECTED
BUN SERPL-MCNC: 34 MG/DL (ref 6–20)
BUN SERPL-MCNC: 35 MG/DL (ref 6–20)
BUN/CREAT SERPL: 62 (ref 12–20)
BUN/CREAT SERPL: 69 (ref 12–20)
C DIFF GDH STL QL: NEGATIVE
C DIFF TOX A+B STL QL IA: NEGATIVE
C PNEUM DNA SPEC QL NAA+PROBE: NOT DETECTED
CA-I BLD-SCNC: 1.29 MMOL/L (ref 1.12–1.32)
CALCIUM SERPL-MCNC: 8.6 MG/DL (ref 8.5–10.1)
CALCIUM SERPL-MCNC: 9.3 MG/DL (ref 8.5–10.1)
CHLORIDE SERPL-SCNC: 121 MMOL/L (ref 97–108)
CHLORIDE SERPL-SCNC: 122 MMOL/L (ref 97–108)
CO2 SERPL-SCNC: 19 MMOL/L (ref 21–32)
CO2 SERPL-SCNC: 20 MMOL/L (ref 21–32)
CREAT SERPL-MCNC: 0.51 MG/DL (ref 0.55–1.02)
CREAT SERPL-MCNC: 0.55 MG/DL (ref 0.55–1.02)
DATE LAST DOSE: NORMAL
DIFFERENTIAL METHOD BLD: ABNORMAL
EOSINOPHIL # BLD: 0 K/UL (ref 0–0.4)
EOSINOPHIL NFR BLD: 0 % (ref 0–7)
ERYTHROCYTE [DISTWIDTH] IN BLOOD BY AUTOMATED COUNT: 13.9 % (ref 11.5–14.5)
FLUAV H1 2009 PAND RNA SPEC QL NAA+PROBE: NOT DETECTED
FLUAV H1 RNA SPEC QL NAA+PROBE: NOT DETECTED
FLUAV H3 RNA SPEC QL NAA+PROBE: NOT DETECTED
FLUAV SUBTYP SPEC NAA+PROBE: NOT DETECTED
FLUBV RNA SPEC QL NAA+PROBE: NOT DETECTED
GAS FLOW.O2 O2 DELIVERY SYS: ABNORMAL L/MIN
GAS FLOW.O2 SETTING OXYMISER: 2 L/M
GLOBULIN SER CALC-MCNC: 4.1 G/DL (ref 2–4)
GLUCOSE SERPL-MCNC: 108 MG/DL (ref 65–100)
GLUCOSE SERPL-MCNC: 116 MG/DL (ref 65–100)
HADV DNA SPEC QL NAA+PROBE: NOT DETECTED
HCO3 BLD-SCNC: 18.6 MMOL/L (ref 22–26)
HCOV 229E RNA SPEC QL NAA+PROBE: NOT DETECTED
HCOV HKU1 RNA SPEC QL NAA+PROBE: NOT DETECTED
HCOV NL63 RNA SPEC QL NAA+PROBE: NOT DETECTED
HCOV OC43 RNA SPEC QL NAA+PROBE: NOT DETECTED
HCT VFR BLD AUTO: 30.2 % (ref 35–47)
HGB BLD-MCNC: 10.3 G/DL (ref 11.5–16)
HMPV RNA SPEC QL NAA+PROBE: NOT DETECTED
HPIV1 RNA SPEC QL NAA+PROBE: NOT DETECTED
HPIV2 RNA SPEC QL NAA+PROBE: NOT DETECTED
HPIV3 RNA SPEC QL NAA+PROBE: NOT DETECTED
HPIV4 RNA SPEC QL NAA+PROBE: NOT DETECTED
IMM GRANULOCYTES # BLD AUTO: 0.3 K/UL (ref 0–0.04)
IMM GRANULOCYTES NFR BLD AUTO: 2 % (ref 0–0.5)
INTERPRETATION: NORMAL
LYMPHOCYTES # BLD: 1.5 K/UL (ref 0.8–3.5)
LYMPHOCYTES NFR BLD: 9 % (ref 12–49)
M PNEUMO DNA SPEC QL NAA+PROBE: NOT DETECTED
MCH RBC QN AUTO: 28.9 PG (ref 26–34)
MCHC RBC AUTO-ENTMCNC: 34.1 G/DL (ref 30–36.5)
MCV RBC AUTO: 84.6 FL (ref 80–99)
MONOCYTES # BLD: 0.8 K/UL (ref 0–1)
MONOCYTES NFR BLD: 5 % (ref 5–13)
NEUTS SEG # BLD: 13.8 K/UL (ref 1.8–8)
NEUTS SEG NFR BLD: 84 % (ref 32–75)
NRBC # BLD: 0 K/UL (ref 0–0.01)
NRBC BLD-RTO: 0 PER 100 WBC
O2/TOTAL GAS SETTING VFR VENT: 0.28 %
PCO2 BLD: 23 MMHG (ref 35–45)
PH BLD: 7.52 [PH] (ref 7.35–7.45)
PLATELET # BLD AUTO: 62 K/UL (ref 150–400)
PMV BLD AUTO: 11.4 FL (ref 8.9–12.9)
PO2 BLD: 78 MMHG (ref 80–100)
POTASSIUM SERPL-SCNC: 2.7 MMOL/L (ref 3.5–5.1)
POTASSIUM SERPL-SCNC: 2.8 MMOL/L (ref 3.5–5.1)
PROT SERPL-MCNC: 6.3 G/DL (ref 6.4–8.2)
RBC # BLD AUTO: 3.57 M/UL (ref 3.8–5.2)
RBC MORPH BLD: ABNORMAL
REPORTED DOSE,DOSE: NORMAL UNITS
REPORTED DOSE/TIME,TMG: NORMAL
RSV RNA SPEC QL NAA+PROBE: NOT DETECTED
RV+EV RNA SPEC QL NAA+PROBE: DETECTED
SAO2 % BLD: 97 % (ref 92–97)
SARS-COV-2 PCR, COVPCR: NOT DETECTED
SODIUM SERPL-SCNC: 151 MMOL/L (ref 136–145)
SODIUM SERPL-SCNC: 152 MMOL/L (ref 136–145)
SPECIMEN TYPE: ABNORMAL
VANCOMYCIN TROUGH SERPL-MCNC: 9 UG/ML (ref 5–10)
WBC # BLD AUTO: 16.4 K/UL (ref 3.6–11)

## 2021-03-02 PROCEDURE — 80053 COMPREHEN METABOLIC PANEL: CPT

## 2021-03-02 PROCEDURE — 74011250636 HC RX REV CODE- 250/636: Performed by: INTERNAL MEDICINE

## 2021-03-02 PROCEDURE — 85025 COMPLETE CBC W/AUTO DIFF WBC: CPT

## 2021-03-02 PROCEDURE — 65660000001 HC RM ICU INTERMED STEPDOWN

## 2021-03-02 PROCEDURE — 36415 COLL VENOUS BLD VENIPUNCTURE: CPT

## 2021-03-02 PROCEDURE — 74177 CT ABD & PELVIS W/CONTRAST: CPT

## 2021-03-02 PROCEDURE — 74011250636 HC RX REV CODE- 250/636: Performed by: NURSE PRACTITIONER

## 2021-03-02 PROCEDURE — 99222 1ST HOSP IP/OBS MODERATE 55: CPT | Performed by: INTERNAL MEDICINE

## 2021-03-02 PROCEDURE — 74011000258 HC RX REV CODE- 258: Performed by: INTERNAL MEDICINE

## 2021-03-02 PROCEDURE — 87324 CLOSTRIDIUM AG IA: CPT

## 2021-03-02 PROCEDURE — 74011250636 HC RX REV CODE- 250/636: Performed by: FAMILY MEDICINE

## 2021-03-02 PROCEDURE — 36600 WITHDRAWAL OF ARTERIAL BLOOD: CPT

## 2021-03-02 PROCEDURE — 74011000636 HC RX REV CODE- 636: Performed by: RADIOLOGY

## 2021-03-02 PROCEDURE — 82803 BLOOD GASES ANY COMBINATION: CPT

## 2021-03-02 PROCEDURE — 80202 ASSAY OF VANCOMYCIN: CPT

## 2021-03-02 RX ORDER — LORAZEPAM 2 MG/ML
2 INJECTION INTRAMUSCULAR
Status: DISCONTINUED | OUTPATIENT
Start: 2021-03-02 | End: 2021-03-09

## 2021-03-02 RX ORDER — POTASSIUM CHLORIDE 7.45 MG/ML
10 INJECTION INTRAVENOUS
Status: COMPLETED | OUTPATIENT
Start: 2021-03-02 | End: 2021-03-02

## 2021-03-02 RX ORDER — DIAZEPAM 10 MG/2ML
10 INJECTION INTRAMUSCULAR ONCE
Status: COMPLETED | OUTPATIENT
Start: 2021-03-02 | End: 2021-03-02

## 2021-03-02 RX ORDER — LORAZEPAM 2 MG/ML
4 INJECTION INTRAMUSCULAR
Status: DISCONTINUED | OUTPATIENT
Start: 2021-03-02 | End: 2021-03-09

## 2021-03-02 RX ORDER — METRONIDAZOLE 500 MG/100ML
500 INJECTION, SOLUTION INTRAVENOUS EVERY 12 HOURS
Status: DISCONTINUED | OUTPATIENT
Start: 2021-03-02 | End: 2021-03-03

## 2021-03-02 RX ORDER — DEXTROSE, SODIUM CHLORIDE, AND POTASSIUM CHLORIDE 5; .45; .3 G/100ML; G/100ML; G/100ML
INJECTION INTRAVENOUS CONTINUOUS
Status: DISCONTINUED | OUTPATIENT
Start: 2021-03-02 | End: 2021-03-03

## 2021-03-02 RX ADMIN — PIPERACILLIN AND TAZOBACTAM 3.38 G: 3; .375 INJECTION, POWDER, LYOPHILIZED, FOR SOLUTION INTRAVENOUS at 05:20

## 2021-03-02 RX ADMIN — POTASSIUM CHLORIDE 10 MEQ: 10 INJECTION, SOLUTION INTRAVENOUS at 13:45

## 2021-03-02 RX ADMIN — VANCOMYCIN HYDROCHLORIDE 1000 MG: 1 INJECTION, POWDER, LYOPHILIZED, FOR SOLUTION INTRAVENOUS at 13:45

## 2021-03-02 RX ADMIN — POTASSIUM CHLORIDE, DEXTROSE MONOHYDRATE AND SODIUM CHLORIDE: 300; 5; 450 INJECTION, SOLUTION INTRAVENOUS at 21:06

## 2021-03-02 RX ADMIN — LORAZEPAM 2 MG: 2 INJECTION INTRAMUSCULAR; INTRAVENOUS at 21:06

## 2021-03-02 RX ADMIN — Medication 10 ML: at 21:12

## 2021-03-02 RX ADMIN — CEFEPIME HYDROCHLORIDE 2 G: 2 INJECTION, POWDER, FOR SOLUTION INTRAVENOUS at 22:00

## 2021-03-02 RX ADMIN — LORAZEPAM 2 MG: 2 INJECTION INTRAMUSCULAR; INTRAVENOUS at 05:17

## 2021-03-02 RX ADMIN — PIPERACILLIN AND TAZOBACTAM 3.38 G: 3; .375 INJECTION, POWDER, LYOPHILIZED, FOR SOLUTION INTRAVENOUS at 11:21

## 2021-03-02 RX ADMIN — POTASSIUM CHLORIDE, DEXTROSE MONOHYDRATE AND SODIUM CHLORIDE: 300; 5; 450 INJECTION, SOLUTION INTRAVENOUS at 11:21

## 2021-03-02 RX ADMIN — LORAZEPAM 4 MG: 2 INJECTION INTRAMUSCULAR; INTRAVENOUS at 22:30

## 2021-03-02 RX ADMIN — POTASSIUM CHLORIDE 10 MEQ: 10 INJECTION, SOLUTION INTRAVENOUS at 12:00

## 2021-03-02 RX ADMIN — DIAZEPAM 10 MG: 5 INJECTION, SOLUTION INTRAMUSCULAR; INTRAVENOUS at 23:05

## 2021-03-02 RX ADMIN — CEFEPIME HYDROCHLORIDE 2 G: 2 INJECTION, POWDER, FOR SOLUTION INTRAVENOUS at 13:45

## 2021-03-02 RX ADMIN — VANCOMYCIN HYDROCHLORIDE 1000 MG: 1 INJECTION, POWDER, LYOPHILIZED, FOR SOLUTION INTRAVENOUS at 06:21

## 2021-03-02 RX ADMIN — VANCOMYCIN HYDROCHLORIDE 1000 MG: 1 INJECTION, POWDER, LYOPHILIZED, FOR SOLUTION INTRAVENOUS at 23:04

## 2021-03-02 RX ADMIN — IOPAMIDOL 100 ML: 755 INJECTION, SOLUTION INTRAVENOUS at 12:00

## 2021-03-02 RX ADMIN — Medication 10 ML: at 00:00

## 2021-03-02 RX ADMIN — Medication 10 ML: at 13:53

## 2021-03-02 RX ADMIN — LORAZEPAM 2 MG: 2 INJECTION INTRAMUSCULAR; INTRAVENOUS at 13:45

## 2021-03-02 RX ADMIN — POTASSIUM CHLORIDE 10 MEQ: 10 INJECTION, SOLUTION INTRAVENOUS at 11:21

## 2021-03-02 RX ADMIN — METRONIDAZOLE 500 MG: 500 INJECTION, SOLUTION INTRAVENOUS at 13:45

## 2021-03-02 RX ADMIN — LORAZEPAM 2 MG: 2 INJECTION INTRAMUSCULAR; INTRAVENOUS at 01:30

## 2021-03-02 RX ADMIN — Medication 10 ML: at 05:18

## 2021-03-02 RX ADMIN — POTASSIUM CHLORIDE 10 MEQ: 10 INJECTION, SOLUTION INTRAVENOUS at 10:19

## 2021-03-02 RX ADMIN — KETOROLAC TROMETHAMINE 15 MG: 30 INJECTION, SOLUTION INTRAMUSCULAR at 05:17

## 2021-03-02 NOTE — PROGRESS NOTES
2330: Pt removed fecal management system, RN placed new one  0030 Pt trying to bite RN and spitting while trying to get labs. 0400 Pt attempting to kick RN while trying to get labs and change her. Bedside and Verbal shift change report given to Monica Rice (oncoming nurse) by Vaughn Costa (offgoing nurse). Report included the following information SBAR, ED Summary, MAR, Accordion, Recent Results, Med Rec Status, Cardiac Rhythm Sinus Tach, Alarm Parameters  and Quality Measures.    Last 3 Recorded Weights in this Encounter    02/28/21 1734 03/01/21 0420 03/02/21 0615   Weight: 59 kg (130 lb) 55.2 kg (121 lb 12.8 oz) 55.1 kg (121 lb 7.6 oz)

## 2021-03-02 NOTE — PROGRESS NOTES
[facility]  Hospitalist Group                                                                                          Hospitalist Progress Note  Gordon Jha MD  Answering service: [phone] from in house phone        Date of Service:  3/2/2021  NAME:  Tanya Rodriguez  :  1990  MRN:  [id number]      Please note that this dictation was completed with [employer], the computer voice recognition software. Quite often unanticipated grammatical, syntax, homophones, and other interpretive errors are inadvertently transcribed by the computer software. Please disregard these errors. Please excuse any errors that have escaped final proofreading. Admission Summary:   Supriya Christensen is a 31 y.o.  female who presents with above symptoms. Started suddenly. Progressively worse. Severe now. Patient with altered mental status not willing and unable to provide information. Mother reports she is adult but lives at her house. Not sure when she used any type of IV drugs last time. Possibly yesterday. Reports she has been dealing with IV drug abuse for many years. Apparently after an . Mother reports she has been multiple times in, due to narcotic overdose, reversed by nasal Narcan. She also has history of amphetamine use, unknown last use. Last 4 days unable to ambulate. She has been suffering from severe myalgia and fever occasional cough, negative for Covid no exposure. Mother reports she was negative as well. Patient and mother report, never had similar symptoms in the past.  She takes Suboxone and Xanax as needed for anxiety. Not have any other prescriptions.       Interval history / Subjective:   Patient seen and examined today. Labs imaging and chart reviewed.     Patient again is agitated and thrashing around in the bed  She tried to answer my questions about again was confused    Hyponatremia and hypokalemia noticed today because of free water loss from diarrhea  CT abdomen, C. difficile IV Ativan as needed ordered       Assessment & Plan:     Sepsis due to bacteremia POA   With evidence of staph aureus bacteremia 4 x 4, and also the presence of leukocytosis with elevated CRP and tachycardia  Troponins elevated as well but flat  IV drug abuser and so my suspicion for endocarditis is very high at this point of time  Started on vancomycin and Zosyn, 2D echo ordered to look for vegetations, changed to cefepime and flagyl by ID   Repeat blood cultures   Infectious diseases consulted   CT chest shows a may be possibility of some aspiration  In the bronchi, but no evident pneumonia  Respiratory PCR negative including SARS      Diarrhea. Check for C. Difficile. CT abdomen ordered    Hypernatremia and hypokalemia  Due to free water loss from a diarrhea. Checking C. difficile. Change IV fluids to dextrose with potassium  Patient is n.p.o. as she is not safe to swallow    Altered mental status  Likely withdrawal from drugs and/or alcohol  But cannot rule out any septic emboli in the brain  CT of the head without any acute abnormality. IV Ativan 2 mg and 4 mg as needed   if no improvement needs Precedex drip in the ICU  And an MRI needed to rule out septic emboli in the brain      Possibility of her benzo withdrawal.  And/or amphetamine overdose  Started patient back on Xanax and her Suboxone  Symptomatic treatment needed  IV Ativan as needed. See above    Hypokalemia  IV fluids with potassium       N.p.o.  CT scan shows a possibility of aspiration  She is not awake and alert to eat at this point of time      Thrombocytopenia  Hold Lovenox    Body mass index is 19.61 kg/m². History of hepatitis C. Talked to dad on phone, [phone] on 3/1.   Left VM on 3/2      Code status: FULL  DVT prophylaxis: scd     Care Plan discussed with: Patient/Family  Anticipated Disposition: Home w/Family  Anticipated Discharge: Greater than 48 hours     Hospital Problems  Never Reviewed          Codes Class Noted POA    Sepsis ([facility] ICD-10-CM: A41.9  ICD-9-CM: 038.9, 995.91  2/28/2021 Unknown                Review of Systems:   A comprehensive review of systems was negative except for that written in the HPI. Vital Signs:    Last 24hrs VS reviewed since prior progress note. Most recent are:  Visit Vitals  /86 (BP 1 Location: Left upper arm, BP Patient Position: At rest)   Pulse 88   Temp 100.1 °F (37.8 °C)   Resp (!) 43   Ht 5' 6\" (1.676 m)   Wt 55.1 kg (121 lb 7.6 oz)   SpO2 97%   BMI 19.61 kg/m²         Intake/Output Summary (Last 24 hours) at 3/2/2021 1459  Last data filed at 3/1/2021 1911  Gross per 24 hour   Intake 600 ml   Output    Net 600 ml        Physical Examination:     I had a face to face encounter with this patient and independently examined them on 03/02/21 as outlined below:        Constitutional:  Patient in restraints and trying to move around in the bed   ENT:  Oral mucosa moist, oropharynx benign. Resp:  CTA bilaterally. No wheezing/rhonchi/rales. No accessory muscle use   CV:  Regular rhythm, normal rate, no murmurs, gallops, rubs    GI:  Soft, non distended, non tender.  normoactive bowel sounds, no hepatosplenomegaly     Musculoskeletal:  Moving around in the bed    Neurologic:  Obtunded            Data Review:    Review and/or order of clinical lab test      Labs:     Recent Labs     03/02/21  1041 03/01/21  0510   WBC 16.4* 14.5*   HGB 10.3* 11.1*   HCT 30.2* 31.6*   PLT 62* 56*     Recent Labs     03/02/21  1041 03/02/21  0613 03/01/21  0510   * 151* 142   K 2.7* 2.8* 3.0*   * 121* 111*   CO2 19* 20* 22   BUN 34* 35* 19   CREA 0.55 0.51* 0.45*   * 116* 108*   CA 8.6 9.3 8.8     Recent Labs     03/02/21  0613 03/01/21  0510 02/28/21 2034   ALT 30 34 44   * 177* 202*   TBILI 1.3* 2.2* 2.3*   TP 6.3* 6.0* 6.8   ALB 2.2* 2.1* 2.6*   GLOB 4.1* 3.9 4.2*     No results for input(s): INR, PTP, APTT, INREXT, INREXT in the last 72 hours. No results for input(s): FE, TIBC, PSAT, FERR in the last 72 hours. No results found for: FOL, RBCF   No results for input(s): PH, PCO2, PO2 in the last 72 hours.   Recent Labs     03/01/21  0510 02/28/21 2034 02/28/21  0800   CPK  --   --  131   TROIQ 0.18* 0.18* 0.14*     No results found for: CHOL, CHOLX, CHLST, CHOLV, HDL, HDLP, LDL, LDLC, DLDLP, TGLX, TRIGL, TRIGP, CHHD, CHHDX  No results found for: [facility]  Lab Results   Component Value Date/Time    Color DARK YELLOW 02/28/2021 06:12 PM    Appearance CLEAR 02/28/2021 06:12 PM    Specific gravity 1.020 02/28/2021 06:12 PM    Specific gravity 1.016 02/28/2021 06:00 AM    pH (UA) 6.5 02/28/2021 06:12 PM    Protein 100 (A) 02/28/2021 06:12 PM    Glucose Negative 02/28/2021 06:12 PM    Ketone 15 (A) 02/28/2021 06:12 PM    Bilirubin Negative 02/28/2021 06:00 AM    Urobilinogen 2.0 (H) 02/28/2021 06:12 PM    Nitrites Positive (A) 02/28/2021 06:12 PM    Leukocyte Esterase MODERATE (A) 02/28/2021 06:12 PM    Epithelial cells FEW 02/28/2021 06:12 PM    Bacteria 1+ (A) 02/28/2021 06:12 PM    WBC 20-50 02/28/2021 06:12 PM    RBC >100 (H) 02/28/2021 06:12 PM         Medications Reviewed:     Current Facility-Administered Medications   Medication Dose Route Frequency    dextrose 5% - 0.45% NaCl with KCl 40 mEq/L infusion   IntraVENous CONTINUOUS    iopamidoL (ISOVUE-370) 76 % injection 100 mL  100 mL IntraVENous RAD ONCE    iohexoL (OMNIPAQUE) 240 mg iodine/mL solution 50 mL  50 mL Oral RAD ONCE    iohexoL (OMNIPAQUE) 240 mg iodine/mL solution        LORazepam (ATIVAN) injection 4 mg  4 mg IntraVENous Q4H PRN    LORazepam (ATIVAN) injection 2 mg  2 mg IntraVENous Q2H PRN    cefepime (MAXIPIME) 2 g in 0.9% sodium chloride (MBP/ADV) 100 mL MBP  2 g IntraVENous Q8H    metroNIDAZOLE (FLAGYL) IVPB premix 500 mg  500 mg IntraVENous Q12H    influenza vaccine 2020-21 (6 mos+)(PF) (FLUARIX/FLULAVAL/FLUZONE QUAD) injection 0.5 mL  0.5 mL IntraMUSCular PRIOR TO DISCHARGE    buprenorphine-naloxone (SUBOXONE) 8-2mg SL tablet  1 Tab SubLINGual BID    ketorolac (TORADOL) injection 15 mg  15 mg IntraVENous Q6H PRN    sodium chloride (NS) flush 5-40 mL  5-40 mL IntraVENous Q8H    sodium chloride (NS) flush 5-40 mL  5-40 mL IntraVENous PRN    [Held by provider] acetaminophen (TYLENOL) tablet 650 mg  650 mg Oral Q6H PRN    Or    [Held by provider] acetaminophen (TYLENOL) suppository 650 mg  650 mg Rectal Q6H PRN    polyethylene glycol (MIRALAX) packet 17 g  17 g Oral DAILY PRN    promethazine (PHENERGAN) tablet 12.5 mg  12.5 mg Oral Q6H PRN    Or    ondansetron (ZOFRAN) injection 4 mg  4 mg IntraVENous Q6H PRN    [Held by provider] enoxaparin (LOVENOX) injection 40 mg  40 mg SubCUTAneous DAILY    Vancomycin- pharmacyto dose   Other Rx Dosing/Monitoring    vancomycin (VANCOCIN) 1,000 mg in 0.9% sodium chloride 250 mL (VIAL-MATE)  1,000 mg IntraVENous Q8H     ______________________________________________________________________  EXPECTED LENGTH OF STAY: 4d 19h  ACTUAL LENGTH OF STAY:          2                 Clarita Lloyd MD

## 2021-03-02 NOTE — PROGRESS NOTES
Reason for Admission:   Admitted from home with complaints of fever elevated white count and troponin. Has long history of IV drug abuse and with at least one overdose. RUR Score:   19%-Med               PCP: does not have a PCP. Will ask CM specialist to assist with scheduling a follow up appointment with a new PCP. Do you (patient/family) have any concerns for transition/discharge? Plan for utilizing home health:     No needs identified  Current Advanced Directive/Advance Care Plan:  Does not have AMD    Healthcare Decision Maker: Janet mckeon            Transition of Care Plan:   Consult to ID for sepsis  Patient agitated and aggressive at times  Once medically stable will likely discharge home with family.   Care Management Interventions  PCP Verified by CM: No  Mode of Transport at Discharge: (parents car)  Current Support Network: (Lives in parents home)  The Patient and/or Patient Representative was Provided with a Choice of Provider and Agrees with the Discharge Plan?: (Life partner Lizy Mejia)  [address] Provided?: No  Kailee Coleman RN CRM  Ext [phone]

## 2021-03-02 NOTE — PROGRESS NOTES
Problem: Falls - Risk of  Goal: *Absence of Falls  Description: Document Sheri Minium Fall Risk and appropriate interventions in the flowsheet. Outcome: Progressing Towards Goal  Note: Fall Risk Interventions:       Mentation Interventions: Adequate sleep, hydration, pain control    Medication Interventions: Bed/chair exit alarm    Elimination Interventions: Bed/chair exit alarm              Problem: Pressure Injury - Risk of  Goal: *Prevention of pressure injury  Description: Document Terrance Scale and appropriate interventions in the flowsheet.   Outcome: Progressing Towards Goal  Note: Pressure Injury Interventions:  Sensory Interventions: Assess need for specialty bed    Moisture Interventions: Absorbent underpads, Check for incontinence Q2 hours and as needed    Activity Interventions: Assess need for specialty bed    Mobility Interventions: Assess need for specialty bed, HOB 30 degrees or less    Nutrition Interventions: Document food/fluid/supplement intake    Friction and Shear Interventions: Apply protective barrier, creams and emollients

## 2021-03-02 NOTE — CONSULTS
Infectious Disease Consult Note    Reason for Consult: sepsis  Date of Consultation: March 2, 2021  Date of Admission: 2/28/2021  Referring Physician: Dr Ligia Torres       HPI:    I received a consult request today from Dr Gordo Irby to obtain any history from patient who was not verbal and very agitate and restless when I went to see her. Primary team informed of this. From chart review, Ms Renny Noriega is a 31 yr old lady wt hx of IVDU, Hep C, anxiety, aggressive outburst who was admitted 2/28/21 with chief complaint of \" altered mental status myalgia and fever\". H&P indicates that there has been history of narcotic overdose and a history of amphetamine use. ER notes initially indicate patient reported pain all over for 3 days in the triage note. ER provider notes indicate the patient was providing minimal history, falling asleep during interview. ER notes indicate patient was admitted in December to [facility] regional behavioral health for psychosis , substance abuse disorder. ER notes indicate that she was \" ill-appearing with tachycardia and generalized abdominal tenderness. Another ER note indicates patient arrives from [facility] accompanied by mother with altered mental status and chest pain. Mother reports patient is active IV drug abuser. Patient unable to get out of vehicle upon arrival to [facility] ED due to generalized pain\" per chart. From the H&P it is noted that she had severe sepsis she was started on vancomycin, ceftriaxone on 2/28/2021. Zosyn was started on 3/1/2021. From chart review she has been tachycardic, tachypneic yesterday. T-max is 101.2. Labs show leukocytosis that is worsening, thrombocytopenia, elevated inflammatory markers including ESR and CRP. Urine analysis greater than 100 RBC 1+ bacteria moderate leukocyte esterase and positive nitrites with large blood. Labs indicate hypokalemia, hyponatremia. BUN 34 creatinine 0.55 today.   She had mild elevation of AST 42, alkaline phosphatase 183, bilirubin 1.3 and ALT 30. Ammonia less than 10. Elevation of troponin noted and . Drug screen positive for amphetamines and THC. Point-of-care urine pregnancy test - 2/28/2021. Blood culture at [facility] reported as gram-positive cocci in clusters preliminary report of probable staph aureus (negative for antigen detection) confirmation and sensitivities to follow growing in all 4 bottles drawn. Urine culture 220 and 21 which is a clean-catch is greater than 100,000 colonies with culture in progress. C. difficile testing pending. Rapid SARS-CoV-2 and viral respiratory panel negative except for positive rhinovirus and enterovirus on the viral respiratory panel that was detected    She is currently on IV vancomycin and Zosyn      Past Medical History:  Past Medical History:   Diagnosis Date    Aggressive outburst     Anxiety disorder     Hepatitis C     IV drug abuse ([facility] [address]          Surgical History:  Unable to obtain from patient    Family History:   Unable to obtain from patient social History:      Social hx  Unable to obtain from patient    Allergies:  No Known Allergies      Review of Systems:    Unable to obtain from patient    Medications:  No current facility-administered medications on file prior to encounter. Current Outpatient Medications on File Prior to Encounter   Medication Sig Dispense Refill    cephALEXin (Keflex) 250 mg capsule Take 1 Cap by mouth three (3) times daily.  21 Cap 0           Physical Exam:    Vitals:   Patient Vitals for the past 24 hrs:   Temp Pulse Resp BP SpO2   03/02/21 0516 (!) 101.8 °F (38.8 °C) (!) 111 30 (!) 143/95 95 %   03/02/21 0042 100.2 °F (37.9 °C) (!) 103 19 132/87 100 %   03/01/21 2022 (!) 101.2 °F (38.4 °C) (!) 117 (!) 46 (!) 135/93 95 %   03/01/21 2021  (!) 118 (!) 41  95 %   03/01/21 2020  (!) 116 (!) 40  95 %   03/01/21 2019  (!) 116 (!) 43  95 %   03/01/21 2018  (!) 116 (!) 36  95 %   03/01/21 2017  (!) 119 (!) 35  95 %   03/01/21 2016  (!) 117 (!) 44  95 %   03/01/21 2015  (!) 115 (!) 39  95 %   03/01/21 2014  (!) 116 (!) 34  95 %   03/01/21 2013  (!) 120 (!) 37  94 %   03/01/21 2012  (!) 117 (!) 34  95 %   03/01/21 2011  (!) 116 (!) 31  95 %   03/01/21 2010  (!) 118 24  95 %   03/01/21 2009  (!) 120 (!) 45  95 %   03/01/21 2008  (!) 118 (!) 32  95 %   03/01/21 2007  (!) 121 (!) 39  95 %   03/01/21 2006  (!) 123 (!) 44  96 %   03/01/21 2005  (!) 123 29  97 %   03/01/21 2004  (!) 127 27  96 %   03/01/21 2003  (!) 122 (!) 51  96 %   03/01/21 2002  (!) 114 (!) 48  96 %   03/01/21 2001  (!) 116 (!) 45  96 %   03/01/21 2000  (!) 116 (!) 41  95 %   03/01/21 1959  (!) 112 (!) 47  95 %   03/01/21 1958  (!) 116 (!) 46  95 %   03/01/21 1957  (!) 117 (!) 51  95 %   03/01/21 1956  (!) 114 (!) 35  95 %   03/01/21 1955  (!) 117 (!) 37  95 %   03/01/21 1954  (!) 116 (!) 44  98 %   03/01/21 1953  (!) 125 (!) 31  91 %   03/01/21 1952  (!) 124 (!) 56  95 %   03/01/21 1951  (!) 117 (!) 48  95 %   03/01/21 1950  (!) 118 (!) 47  94 %   03/01/21 1949  (!) 112 (!) 48  95 %   03/01/21 1948  (!) 116 (!) 53  95 %   03/01/21 1947  (!) 116 (!) 43  95 %   03/01/21 1946  (!) 124 (!) 41  95 %   03/01/21 1945  (!) 124 (!) 57  95 %   03/01/21 1944  (!) 128 (!) 36  96 %   03/01/21 1943  (!) 118 26  96 %   03/01/21 1942  (!) 121 (!) 37  98 %   03/01/21 1941  (!) 119 (!) 50  95 %   03/01/21 1940  (!) 120 (!) 45  94 %   03/01/21 1939  (!) 121 (!) 39  95 %   03/01/21 1938  (!) 117 (!) 39  97 %   03/01/21 1937  (!) 118 (!) 38  97 %   03/01/21 1936  (!) 118 (!) 50  96 %   03/01/21 1935  (!) 123 (!) 50  96 %   03/01/21 1934  (!) 121 (!) 43  96 %   03/01/21 1933  (!) 119 (!) 34  94 %   03/01/21 1932  (!) 121 (!) 40  95 %   03/01/21 1931  (!) 120 (!) 54  91 %   03/01/21 1930  (!) 119 (!) 42  95 %   03/01/21 1929  (!) 119 (!) 31  100 %   03/01/21 1928  (!) 120 (!) 42  91 %   03/01/21 1927  (!) 121 (!) 57  96 %   03/01/21 1926  (!) 120 (!) 41  93 %   03/01/21 1925  (!) 114 (!) 39  96 %   03/01/21 1924  (!) 121 (!) 51  95 %   03/01/21 1923  (!) 120 (!) 40  97 %   03/01/21 1922  (!) 118 (!) 42  97 %   03/01/21 1921  (!) 116 (!) 50  97 %   03/01/21 1920  (!) 116 (!) 50  97 %   03/01/21 1919  (!) 120 (!) 52  97 %   03/01/21 1918  (!) 119 (!) 58  96 %   03/01/21 1917  (!) 119 (!) 44  96 %   03/01/21 1916  (!) 119 (!) 36  97 %   03/01/21 1915  (!) 119 (!) 49  96 %   03/01/21 1914  (!) 122 (!) 56  95 %   03/01/21 1913  (!) 119 (!) 37  98 %   03/01/21 1912  (!) 121 (!) 41     03/01/21 1911  (!) 124 (!) 39     03/01/21 1910  (!) 124 (!) 38     03/01/21 1909  (!) 120 (!) 32     03/01/21 1908  (!) 122 (!) 56     03/01/21 1907  (!) 123 (!) 35     03/01/21 1906  (!) 120 (!) 45     03/01/21 1905  (!) 122 (!) 42     03/01/21 1904  (!) 119 (!) 50     03/01/21 1903  (!) 123 (!) 45     03/01/21 1902  (!) 122 (!) 54     03/01/21 1901  (!) 126 (!) 48     03/01/21 1900  (!) 121 (!) 48     03/01/21 1859  (!) 126 30     03/01/21 1858  (!) 125 (!) 33     03/01/21 1857  (!) 123 (!) 49     03/01/21 1856  (!) 123 (!) 54     03/01/21 1855  (!) 123 (!) 40     03/01/21 1854  (!) 121 (!) 46     03/01/21 1853  (!) 124 (!) 32     03/01/21 1852  (!) 120 (!) 39     03/01/21 1851  (!) 121 (!) 44     03/01/21 1850  (!) 121 (!) 48     03/01/21 1849  (!) 122 (!) 48     03/01/21 1848  (!) 119 (!) 40     03/01/21 1847  (!) 123 (!) 44     03/01/21 1846  (!) 122 (!) 53     03/01/21 1845  (!) 122 (!) 45     03/01/21 1844  (!) 128 (!) 44     03/01/21 1843  (!) 125 (!) 48     03/01/21 1842  (!) 123 (!) 55     03/01/21 1841  (!) 121 (!) 41     03/01/21 1840  (!) 124 (!) 31     03/01/21 1839  (!) 123 (!) 37     03/01/21 1838  (!) 120 (!) 38     03/01/21 1837  (!) 120 (!) 58     03/01/21 1836  (!) 124 (!) 44     03/01/21 1835  (!) 116 (!) 55     03/01/21 1834  (!) 122 (!) 49     03/01/21 1833  (!) 127 (!) 36     03/01/21 1832  (!) 124 (!) 31     03/01/21 1831  (!) 128 23     03/01/21 1830  (!) 128 29     03/01/21 1829  (!) 128 29     03/01/21 1828  (!) 122 (!) 34     03/01/21 1827  (!) 121 (!) 43     03/01/21 1826  (!) 118 (!) 40     03/01/21 1825  (!) 123 (!) 47     03/01/21 1824  (!) 120 (!) 45     03/01/21 1823  (!) 122 (!) 47     03/01/21 1822  (!) 122 (!) 47     03/01/21 1821  (!) 121 (!) 46     03/01/21 1820  (!) 122 (!) 54     03/01/21 1819  (!) 119 (!) 42     03/01/21 1818  (!) 121 (!) 34     03/01/21 1817  (!) 122 (!) 44     03/01/21 1816  (!) 121 (!) 41     03/01/21 1815  (!) 121 (!) 50     03/01/21 1814  (!) 121 (!) 40     03/01/21 1813  (!) 123 (!) 37     03/01/21 1812  (!) 123 (!) 47     03/01/21 1811  (!) 117 (!) 39     03/01/21 1810  (!) 119 (!) 49     03/01/21 1809  (!) 119 (!) 50     03/01/21 1808  (!) 121 (!) 54     03/01/21 1807  (!) 122 (!) 55     03/01/21 1806  (!) 123 (!) 43     03/01/21 1805  (!) 121 (!) 54     03/01/21 1804  (!) 121 (!) 48     03/01/21 1803  (!) 118 (!) 49     03/01/21 1802  (!) 121 (!) 52  95 %   03/01/21 1801  (!) 123 (!) 53  93 %   03/01/21 1800  (!) 122 (!) 54  95 %   03/01/21 1600 100 °F (37.8 °C) (!) 125 (!) 34 (!) 138/96 98 %   ·   · GEN: Restless in bed with wrist soft restraints noted  · HEENT: Eyes closed, unable to assess oral cavity  · CV: S1, S2 heard , tachycardic, unable to hear for murmur at this time  · Lungs:  coarse  · Abdomen: soft, non distended, unable to assess of tenderness patient is agitated and restless and consistently moving  · Extremities: no edema noted   · Neuro: eyes closed, agitate and restless moving in bed , trying to pull face mask off but has soft wrist restraints noted   · Skin: no rash on visualized areas   · MSK unable to assess back, no erythema or edema noted of the elbows, wrists knees or the ankles bilaterally  Labs:   Recent Results (from the past 24 hour(s))   RESPIRATORY VIRUS PANEL W/COVID-19, PCR    Collection Time: 03/01/21  5:09 PM    Specimen: Nasopharyngeal   Result Value Ref Range    Adenovirus Not detected NOTD      Coronavirus 229E Not detected NOTD      Coronavirus HKU1 Not detected NOTD      Coronavirus CVNL63 Not detected NOTD      Coronavirus OC43 Not detected NOTD      Metapneumovirus Not detected NOTD      Rhinovirus and Enterovirus Detected (A) NOTD      Influenza A Not detected NOTD      Influenza A, subtype H1 Not detected NOTD      Influenza A, subtype H3 Not detected NOTD      INFLUENZA A H1N1 PCR Not detected NOTD      Influenza B Not detected NOTD      Parainfluenza 1 Not detected NOTD      Parainfluenza 2 Not detected NOTD      Parainfluenza 3 Not detected NOTD      Parainfluenza virus 4 Not detected NOTD      RSV by PCR Not detected NOTD      B. parapertussis, PCR Not detected NOTD      Bordetella pertussis - PCR Not detected NOTD      Chlamydophila pneumoniae DNA, QL, PCR Not detected NOTD      Mycoplasma pneumoniae DNA, QL, PCR Not detected NOTD      SARS-CoV-2, PCR Not detected NOTD     METABOLIC PANEL, COMPREHENSIVE    Collection Time: 03/02/21  6:13 AM   Result Value Ref Range    Sodium 151 (H) 136 - 145 mmol/L    Potassium 2.8 (L) 3.5 - 5.1 mmol/L    Chloride 121 (H) 97 - 108 mmol/L    CO2 20 (L) 21 - 32 mmol/L    Anion gap 10 5 - 15 mmol/L    Glucose 116 (H) 65 - 100 mg/dL    BUN 35 (H) 6 - 20 MG/DL    Creatinine 0.51 (L) 0.55 - 1.02 MG/DL    BUN/Creatinine ratio 69 (H) 12 - 20      GFR est AA >60 >60 ml/min/1.73m2    GFR est non-AA >60 >60 ml/min/1.73m2    Calcium 9.3 8.5 - 10.1 MG/DL    Bilirubin, total 1.3 (H) 0.2 - 1.0 MG/DL    ALT (SGPT) 30 12 - 78 U/L    AST (SGOT) 42 (H) 15 - 37 U/L    Alk.  phosphatase 183 (H) 45 - 117 U/L    Protein, total 6.3 (L) 6.4 - 8.2 g/dL    Albumin 2.2 (L) 3.5 - 5.0 g/dL    Globulin 4.1 (H) 2.0 - 4.0 g/dL    A-G Ratio 0.5 (L) 1.1 - 2.2     VANCOMYCIN, TROUGH    Collection Time: 03/02/21  6:13 AM   Result Value Ref Range    Vancomycin,trough 9.0 5.0 - 10.0 ug/mL    Reported dose date NOT PROVIDED      Reported dose time: NOT PROVIDED      Reported dose: NOT PROVIDED UNITS   POC EG7    Collection Time: 03/02/21  9:11 AM   Result Value Ref Range    Calcium, ionized (POC) 1.29 1.12 - 1.32 mmol/L    FIO2 (POC) 0.28 %    pH (POC) 7.52 (H) 7.35 - 7.45      pCO2 (POC) 23.0 (L) 35.0 - 45.0 MMHG    pO2 (POC) 78 (L) 80 - 100 MMHG    HCO3 (POC) 18.6 (L) 22 - 26 MMOL/L    Base deficit (POC) 4 mmol/L    sO2 (POC) 97 92 - 97 %    Site RIGHT BRACHIAL      Device: NASAL CANNULA      Flow rate (POC) 2 L/M    Allens test (POC) YES      Specimen type (POC) ARTERIAL     METABOLIC PANEL, BASIC    Collection Time: 03/02/21 10:41 AM   Result Value Ref Range    Sodium 152 (H) 136 - 145 mmol/L    Potassium 2.7 (LL) 3.5 - 5.1 mmol/L    Chloride 122 (H) 97 - 108 mmol/L    CO2 19 (L) 21 - 32 mmol/L    Anion gap 11 5 - 15 mmol/L    Glucose 108 (H) 65 - 100 mg/dL    BUN 34 (H) 6 - 20 MG/DL    Creatinine 0.55 0.55 - 1.02 MG/DL    BUN/Creatinine ratio 62 (H) 12 - 20      GFR est AA >60 >60 ml/min/1.73m2    GFR est non-AA >60 >60 ml/min/1.73m2    Calcium 8.6 8.5 - 10.1 MG/DL   CBC WITH AUTOMATED DIFF    Collection Time: 03/02/21 10:41 AM   Result Value Ref Range    WBC 16.4 (H) 3.6 - 11.0 K/uL    RBC 3.57 (L) 3.80 - 5.20 M/uL    HGB 10.3 (L) 11.5 - 16.0 g/dL    HCT 30.2 (L) 35.0 - 47.0 %    MCV 84.6 80.0 - 99.0 FL    MCH 28.9 26.0 - 34.0 PG    MCHC 34.1 30.0 - 36.5 g/dL    RDW 13.9 11.5 - 14.5 %    PLATELET 62 (L) 213 - 400 K/uL    MPV 11.4 8.9 - 12.9 FL    NRBC 0.0 0  WBC    ABSOLUTE NRBC 0.00 0.00 - 0.01 K/uL    NEUTROPHILS 84 (H) 32 - 75 %    LYMPHOCYTES 9 (L) 12 - 49 %    MONOCYTES 5 5 - 13 %    EOSINOPHILS 0 0 - 7 %    BASOPHILS 0 0 - 1 %    IMMATURE GRANULOCYTES 2 (H) 0.0 - 0.5 %    ABS. NEUTROPHILS 13.8 (H) 1.8 - 8.0 K/UL    ABS. LYMPHOCYTES 1.5 0.8 - 3.5 K/UL    ABS. MONOCYTES 0.8 0.0 - 1.0 K/UL    ABS. EOSINOPHILS 0.0 0.0 - 0.4 K/UL    ABS. BASOPHILS 0.0 0.0 - 0.1 K/UL    ABS. IMM. GRANS. 0.3 (H) 0.00 - 0.04 K/UL    DF SMEAR SCANNED      RBC COMMENTS NORMOCYTIC, NORMOCHROMIC         Microbiology Data:       Blood: 2/28/21  Specimen Information: Blood        Component Value Flag Ref Range Units Status   Special Requests:      Preliminary   No Special Requests    Culture result:      Preliminary   preliminary report of probable S. Aureus (Negative for antigen detection) confirmation and sensitivities to follow. growing in all 4 bottles drawn    Culture result:      Preliminary   Gram pos cocci in clusters          Urine: pending            Imaging:   CT chest  FINDINGS:  The lungs are clear of mass, nodule, airspace disease or edema. There is  opacification of peripheral lower lobe airways bilaterally.     The pulmonary arteries are well enhanced and no pulmonary emboli are identified.     There is no mediastinal or hilar adenopathy or mass. The aorta enhances normally  without evidence of aneurysm or dissection.     The visualized portions of the upper abdominal organs are normal.     IMPRESSION  No pulmonary process or septic emboli. Lower lobe branch bronchial  opacification likely reflecting  secretions or aspiration. Assessment / Plan:     1) bacteremia with gram-positive cocci, cultures pending  2)Hx of IVDU , urine drug screen positive for amphetamines and THC  3)sepsis   4) hyponatremia  5) hypokalemia  6) elevated inflammatory markers  7) mild elevation bilirubin and AST  8) altered mental status, encephalopathy  9) history of overdose per chart  10) thrombocytopenia  11) chart reported history of hepatitis C  12) chart reported history of anxiety, aggressive outbursts, schizoaffective disorder          Currently on IV vancomycin and Zosyn. Continue to monitor renal function closely  Await blood cultures from 2/2/21. Check blood cultures to 24 to 48 hours pending clearance obtained  Check TTE  Await C. difficile results  Possibly   withdrawal and alerted  primary team regarding patient's restlessness/agitation. Plan per primary team  We will change Zosyn to cefepime and Flagyl. Vancomycin plus Zosyn has been known to cause high risk for nephrotoxicity. Also will have better penetration to CNS if necessary with cefepime and Flagyl rather than Zosyn  Hyponatremia and hypokalemia management per primary team  CT abdomen pelvis pending  May  need imaging of her back to assess for potential discitis osteomyelitis. Exam is not helpful at this time        Thank for the opportunity to participate in the care of this patient. Please contact with questions or concerns.            Rylee Garza,   1:21 PM

## 2021-03-02 NOTE — ACP (ADVANCE CARE PLANNING)
Advance Care Planning     Advance Care Planning Activator (Inpatient)  Conversation Note      Date of ACP Conversation: 03/02/21     Conversation Conducted with:   Patient with Decision Making Capacity    ACP Activator: Ryna Smith RN  Health Care Decision Maker:    Current Designated Health Care Decision Maker:     Primary Decision Maker: [address] [place] Partner - [phone]  Click here to complete [address] including selection of the Healthcare Decision Maker Relationship (ie \"Primary\")      Care Preferences    Ventilation: \"If you were in your present state of health and suddenly became very ill and were unable to breathe on your own, what would your preference be about the use of a ventilator (breathing machine) if it were available to you? \"      If patient would desire the use of a ventilator (breathing machine), answer \"yes\", if not \"no\":yes    \"If your health worsens and it becomes clear that your chance of recovery is unlikely, what would your preference be about the use of a ventilator (breathing machine) if it were available to you? \"     Would the patient desire the use of a ventilator (breathing machine)? YES      Resuscitation  \"CPR works best to restart the heart when there is a sudden event, like a heart attack, in someone who is otherwise healthy. Unfortunately, CPR does not typically restart the heart for people who have serious health conditions or who are very sick. \"    \"In the event your heart stopped as a result of an underlying serious health condition, would you want attempts to be made to restart your heart (answer \"yes\" for attempt to resuscitate) or would you prefer a natural death (answer \"no\" for do not attempt to resuscitate)? \" yes      [x] Yes  [] No   Educated Patient / Jessy Silva regarding differences between Advance Directives and portable DNR orders.     Length of ACP Conversation in minutes:  10    Conversation Outcomes:  [x] ACP discussion completed  [] Existing advance directive reviewed with patient; no changes to patient's previously recorded wishes     [] New Advance Directive completed   [] Portable Do Not Resuscitate prepared for Provider review and signature  [] POLST/POST/MOLST/MOST prepared for Provider review and signature      Follow-up plan:    [] Schedule follow-up conversation to continue planning  [] Referred individual to Provider for additional questions/concerns   [] Advised patient/agent/surrogate to review completed ACP document and update if needed with changes in condition, patient preferences or care setting     [] This note routed to one or more involved healthcare providers   Reviewed with patient that if she does not have a Living Will or AMD. That decision making will revert to parents as they are NOK.  She declines to compltete AMD.

## 2021-03-02 NOTE — PROGRESS NOTES
Pharmacist Note - Vancomycin Dosing  Therapy day 3  Indication: GPC bacteremia   Current regimen: 1 gram IV Q 8 hours    Recent Labs     03/02/21  1041 03/02/21  0613 03/01/21  0510 02/28/21  2034   WBC 16.4*  --  14.5* 13.4*   CREA 0.55 0.51* 0.45* 0.55   BUN 34* 35* 19 20       A Trough Level resulted at 9 mcg/mL which was obtained 9.75 hrs post-dose. The extrapolated \"true\" trough is approximately 12 mcg/mL based on the patient's known kinetics. Goal trough: 10 - 15 mcg/mL for anticipated MSSA from preliminary cultures  Plan: Continue current regimen. Pharmacy will continue to monitor this patient daily for changes in clinical status and renal function.

## 2021-03-02 NOTE — PROGRESS NOTES
Clinical Pharmacy Note: Metronidazole Dosing    Please note that the metronidazole dose for Tana Umaña has been changed to 500 mg q12h per [facility]-approved protocol. Please contact the pharmacy with any questions.     NA Pollack [facility]

## 2021-03-03 ENCOUNTER — APPOINTMENT (OUTPATIENT)
Dept: NON INVASIVE DIAGNOSTICS | Age: 31
DRG: 812 | End: 2021-03-03
Attending: INTERNAL MEDICINE
Payer: MEDICAID

## 2021-03-03 ENCOUNTER — APPOINTMENT (OUTPATIENT)
Dept: GENERAL RADIOLOGY | Age: 31
DRG: 812 | End: 2021-03-03
Attending: ANESTHESIOLOGY
Payer: MEDICAID

## 2021-03-03 LAB
ALBUMIN SERPL-MCNC: 2.2 G/DL (ref 3.5–5)
ALBUMIN/GLOB SERPL: 0.5 {RATIO} (ref 1.1–2.2)
ALP SERPL-CCNC: 165 U/L (ref 45–117)
ALT SERPL-CCNC: 27 U/L (ref 12–78)
ANION GAP SERPL CALC-SCNC: 8 MMOL/L (ref 5–15)
AST SERPL-CCNC: 39 U/L (ref 15–37)
BACTERIA SPEC CULT: ABNORMAL
BASOPHILS # BLD: 0 K/UL (ref 0–0.1)
BASOPHILS NFR BLD: 0 % (ref 0–1)
BILIRUB SERPL-MCNC: 1.4 MG/DL (ref 0.2–1)
BUN SERPL-MCNC: 28 MG/DL (ref 6–20)
BUN/CREAT SERPL: 53 (ref 12–20)
CALCIUM SERPL-MCNC: 9.1 MG/DL (ref 8.5–10.1)
CC UR VC: ABNORMAL
CHLORIDE SERPL-SCNC: 124 MMOL/L (ref 97–108)
CO2 SERPL-SCNC: 18 MMOL/L (ref 21–32)
CREAT SERPL-MCNC: 0.53 MG/DL (ref 0.55–1.02)
DIFFERENTIAL METHOD BLD: ABNORMAL
ECHO AO ROOT DIAM: 2.76 CM
ECHO AV AREA PEAK VELOCITY: 2.2 CM2
ECHO AV AREA/BSA PEAK VELOCITY: 1.4 CM2/M2
ECHO AV PEAK GRADIENT: 4.52 MMHG
ECHO AV PEAK VELOCITY: 106.29 CM/S
ECHO LA AREA 4C: 18.57 CM2
ECHO LA MAJOR AXIS: 3.2 CM
ECHO LA MINOR AXIS: 2.03 CM
ECHO LA VOL 4C: 47.83 ML (ref 22–52)
ECHO LA VOLUME INDEX A4C: 30.37 ML/M2 (ref 16–28)
ECHO LV INTERNAL DIMENSION DIASTOLIC: 4.79 CM (ref 3.9–5.3)
ECHO LV INTERNAL DIMENSION SYSTOLIC: 2.86 CM
ECHO LV IVSD: 0.56 CM (ref 0.6–0.9)
ECHO LV MASS 2D: 106.5 G (ref 67–162)
ECHO LV MASS INDEX 2D: 67.6 G/M2 (ref 43–95)
ECHO LV POSTERIOR WALL DIASTOLIC: 0.84 CM (ref 0.6–0.9)
ECHO LVOT DIAM: 1.95 CM
ECHO LVOT PEAK GRADIENT: 2.45 MMHG
ECHO LVOT PEAK VELOCITY: 78.25 CM/S
ECHO PV MAX VELOCITY: 112.17 CM/S
ECHO PV PEAK INSTANTANEOUS GRADIENT SYSTOLIC: 5.03 MMHG
ECHO RV INTERNAL DIMENSION: 3.11 CM
ECHO RV TAPSE: 2.02 CM (ref 1.5–2)
ECHO TV REGURGITANT MAX VELOCITY: 229.1 CM/S
ECHO TV REGURGITANT PEAK GRADIENT: 20.99 MMHG
EOSINOPHIL # BLD: 0 K/UL (ref 0–0.4)
EOSINOPHIL NFR BLD: 0 % (ref 0–7)
ERYTHROCYTE [DISTWIDTH] IN BLOOD BY AUTOMATED COUNT: 14.7 % (ref 11.5–14.5)
GLOBULIN SER CALC-MCNC: 4.4 G/DL (ref 2–4)
GLUCOSE BLD STRIP.AUTO-MCNC: 118 MG/DL (ref 65–100)
GLUCOSE BLD STRIP.AUTO-MCNC: 132 MG/DL (ref 65–100)
GLUCOSE SERPL-MCNC: 119 MG/DL (ref 65–100)
HCT VFR BLD AUTO: 29.4 % (ref 35–47)
HGB BLD-MCNC: 10.1 G/DL (ref 11.5–16)
IMM GRANULOCYTES # BLD AUTO: 0.4 K/UL (ref 0–0.04)
IMM GRANULOCYTES NFR BLD AUTO: 2 % (ref 0–0.5)
LYMPHOCYTES # BLD: 1.8 K/UL (ref 0.8–3.5)
LYMPHOCYTES NFR BLD: 9 % (ref 12–49)
MCH RBC QN AUTO: 29 PG (ref 26–34)
MCHC RBC AUTO-ENTMCNC: 34.4 G/DL (ref 30–36.5)
MCV RBC AUTO: 84.5 FL (ref 80–99)
MONOCYTES # BLD: 1.3 K/UL (ref 0–1)
MONOCYTES NFR BLD: 7 % (ref 5–13)
NEUTS SEG # BLD: 15.9 K/UL (ref 1.8–8)
NEUTS SEG NFR BLD: 82 % (ref 32–75)
NRBC # BLD: 0 K/UL (ref 0–0.01)
NRBC BLD-RTO: 0 PER 100 WBC
PLATELET # BLD AUTO: 109 K/UL (ref 150–400)
PMV BLD AUTO: 12.7 FL (ref 8.9–12.9)
POTASSIUM SERPL-SCNC: 3.8 MMOL/L (ref 3.5–5.1)
PROT SERPL-MCNC: 6.6 G/DL (ref 6.4–8.2)
RBC # BLD AUTO: 3.48 M/UL (ref 3.8–5.2)
SERVICE CMNT-IMP: ABNORMAL
SODIUM SERPL-SCNC: 150 MMOL/L (ref 136–145)
SPECIAL REQUESTS,SREQ: ABNORMAL
WBC # BLD AUTO: 19.4 K/UL (ref 3.6–11)

## 2021-03-03 PROCEDURE — 99232 SBSQ HOSP IP/OBS MODERATE 35: CPT | Performed by: INTERNAL MEDICINE

## 2021-03-03 PROCEDURE — 99221 1ST HOSP IP/OBS SF/LOW 40: CPT | Performed by: THORACIC SURGERY (CARDIOTHORACIC VASCULAR SURGERY)

## 2021-03-03 PROCEDURE — 82962 GLUCOSE BLOOD TEST: CPT

## 2021-03-03 PROCEDURE — 71045 X-RAY EXAM CHEST 1 VIEW: CPT

## 2021-03-03 PROCEDURE — 93306 TTE W/DOPPLER COMPLETE: CPT | Performed by: SPECIALIST

## 2021-03-03 PROCEDURE — 94760 N-INVAS EAR/PLS OXIMETRY 1: CPT

## 2021-03-03 PROCEDURE — 74011250636 HC RX REV CODE- 250/636: Performed by: INTERNAL MEDICINE

## 2021-03-03 PROCEDURE — 77010033678 HC OXYGEN DAILY

## 2021-03-03 PROCEDURE — 74011250636 HC RX REV CODE- 250/636: Performed by: ANESTHESIOLOGY

## 2021-03-03 PROCEDURE — 74011250637 HC RX REV CODE- 250/637: Performed by: FAMILY MEDICINE

## 2021-03-03 PROCEDURE — 74011250636 HC RX REV CODE- 250/636: Performed by: NURSE PRACTITIONER

## 2021-03-03 PROCEDURE — 02HV33Z INSERTION OF INFUSION DEVICE INTO SUPERIOR VENA CAVA, PERCUTANEOUS APPROACH: ICD-10-PCS | Performed by: INTERNAL MEDICINE

## 2021-03-03 PROCEDURE — 74011000250 HC RX REV CODE- 250: Performed by: INTERNAL MEDICINE

## 2021-03-03 PROCEDURE — 74011250636 HC RX REV CODE- 250/636: Performed by: FAMILY MEDICINE

## 2021-03-03 PROCEDURE — 36556 INSERT NON-TUNNEL CV CATH: CPT

## 2021-03-03 PROCEDURE — 74011250637 HC RX REV CODE- 250/637: Performed by: NURSE PRACTITIONER

## 2021-03-03 PROCEDURE — 65660000001 HC RM ICU INTERMED STEPDOWN

## 2021-03-03 PROCEDURE — 93306 TTE W/DOPPLER COMPLETE: CPT

## 2021-03-03 PROCEDURE — 94762 N-INVAS EAR/PLS OXIMTRY CONT: CPT

## 2021-03-03 PROCEDURE — 74011000258 HC RX REV CODE- 258: Performed by: INTERNAL MEDICINE

## 2021-03-03 RX ORDER — IBUPROFEN 200 MG
1 TABLET ORAL DAILY
Status: DISCONTINUED | OUTPATIENT
Start: 2021-03-04 | End: 2021-03-17 | Stop reason: HOSPADM

## 2021-03-03 RX ORDER — DEXTROSE AND POTASSIUM CHLORIDE 5; .15 G/100ML; G/100ML
SOLUTION INTRAVENOUS CONTINUOUS
Status: DISCONTINUED | OUTPATIENT
Start: 2021-03-03 | End: 2021-03-03

## 2021-03-03 RX ORDER — DEXTROSE MONOHYDRATE 50 MG/ML
200 INJECTION, SOLUTION INTRAVENOUS CONTINUOUS
Status: DISCONTINUED | OUTPATIENT
Start: 2021-03-03 | End: 2021-03-04

## 2021-03-03 RX ORDER — LANOLIN ALCOHOL/MO/W.PET/CERES
3 CREAM (GRAM) TOPICAL
Status: DISCONTINUED | OUTPATIENT
Start: 2021-03-03 | End: 2021-03-17 | Stop reason: HOSPADM

## 2021-03-03 RX ORDER — MIDAZOLAM HYDROCHLORIDE 1 MG/ML
1 INJECTION, SOLUTION INTRAMUSCULAR; INTRAVENOUS ONCE
Status: COMPLETED | OUTPATIENT
Start: 2021-03-03 | End: 2021-03-03

## 2021-03-03 RX ORDER — FENTANYL CITRATE 50 UG/ML
50 INJECTION, SOLUTION INTRAMUSCULAR; INTRAVENOUS ONCE
Status: COMPLETED | OUTPATIENT
Start: 2021-03-03 | End: 2021-03-03

## 2021-03-03 RX ORDER — DM/P-EPHED/ACETAMINOPH/DOXYLAM 30-7.5/3
2 LIQUID (ML) ORAL
Status: DISPENSED | OUTPATIENT
Start: 2021-03-03 | End: 2021-03-04

## 2021-03-03 RX ADMIN — LORAZEPAM 4 MG: 2 INJECTION INTRAMUSCULAR; INTRAVENOUS at 16:22

## 2021-03-03 RX ADMIN — FENTANYL CITRATE 50 MCG: 50 INJECTION, SOLUTION INTRAMUSCULAR; INTRAVENOUS at 16:52

## 2021-03-03 RX ADMIN — DEXTROSE MONOHYDRATE 200 ML/HR: 50 INJECTION, SOLUTION INTRAVENOUS at 18:25

## 2021-03-03 RX ADMIN — WATER 2 G: 1 INJECTION INTRAMUSCULAR; INTRAVENOUS; SUBCUTANEOUS at 18:25

## 2021-03-03 RX ADMIN — KETOROLAC TROMETHAMINE 15 MG: 30 INJECTION, SOLUTION INTRAMUSCULAR at 23:40

## 2021-03-03 RX ADMIN — METRONIDAZOLE 500 MG: 500 INJECTION, SOLUTION INTRAVENOUS at 03:28

## 2021-03-03 RX ADMIN — BUPRENORPHINE HYDROCHLORIDE AND NALOXONE HYDROCHLORIDE DIHYDRATE 1 TABLET: 8; 2 TABLET SUBLINGUAL at 18:25

## 2021-03-03 RX ADMIN — NICOTINE POLACRILEX 2 MG: 2 LOZENGE ORAL at 22:26

## 2021-03-03 RX ADMIN — LORAZEPAM 2 MG: 2 INJECTION INTRAMUSCULAR; INTRAVENOUS at 12:47

## 2021-03-03 RX ADMIN — VANCOMYCIN HYDROCHLORIDE 1000 MG: 1 INJECTION, POWDER, LYOPHILIZED, FOR SOLUTION INTRAVENOUS at 06:19

## 2021-03-03 RX ADMIN — LORAZEPAM 4 MG: 2 INJECTION INTRAMUSCULAR; INTRAVENOUS at 20:51

## 2021-03-03 RX ADMIN — WATER 2 G: 1 INJECTION INTRAMUSCULAR; INTRAVENOUS; SUBCUTANEOUS at 22:26

## 2021-03-03 RX ADMIN — ACETAMINOPHEN 650 MG: 325 TABLET ORAL at 20:51

## 2021-03-03 RX ADMIN — ACETAMINOPHEN 650 MG: 650 SUPPOSITORY RECTAL at 05:31

## 2021-03-03 RX ADMIN — Medication 10 ML: at 16:22

## 2021-03-03 RX ADMIN — LORAZEPAM 2 MG: 2 INJECTION INTRAMUSCULAR; INTRAVENOUS at 23:40

## 2021-03-03 RX ADMIN — Medication 10 ML: at 06:21

## 2021-03-03 RX ADMIN — LORAZEPAM 4 MG: 2 INJECTION INTRAMUSCULAR; INTRAVENOUS at 03:28

## 2021-03-03 RX ADMIN — MIDAZOLAM 1 MG: 1 INJECTION INTRAMUSCULAR; INTRAVENOUS at 16:52

## 2021-03-03 RX ADMIN — Medication 3 MG: at 22:26

## 2021-03-03 RX ADMIN — VANCOMYCIN HYDROCHLORIDE 1000 MG: 1 INJECTION, POWDER, LYOPHILIZED, FOR SOLUTION INTRAVENOUS at 12:46

## 2021-03-03 RX ADMIN — CEFEPIME HYDROCHLORIDE 2 G: 2 INJECTION, POWDER, FOR SOLUTION INTRAVENOUS at 06:19

## 2021-03-03 RX ADMIN — Medication 10 ML: at 20:51

## 2021-03-03 NOTE — PERIOP NOTES
To preop for placement of central line. Consent reviewed, placed on telemetry, VS obtained. Given Versed 1 mg and Fentanyl 50 mcg IV as ordered prior to procedure. RIJ quad lumen placed by Dr Harrison Talley. All ports with positive blood returned, flushed and capped. PCXR completed at bedside. . Report called to [facility] . Transported back to room with nurse and tech.

## 2021-03-03 NOTE — PROGRESS NOTES
Spoke to pt's nurse, Tobin Art, regarding midline order. Midline contraindicated at this time due to pt receiving IV Vancomycin. Blood cultures also noted positive from 2/28 and have not yet been repeated -  therefore PICC inappropriate as well. States will discuss with provider for different line.     Gloria Suarez, BISIN, RN, VA-BC   Vascular Access Team

## 2021-03-03 NOTE — PROGRESS NOTES
2000 Pt restless and pulling lines, paged NP for soft mittens. 2200 Bladder scanned and 465ml was recorded. Orders for a hooker placed. Hooker placed    2300 Iv placed and labs sent. 2246 Pt becoming more aggressive and agitated. Tachypneic. RR 40/50's. Paged Santino gotti for orders. Valium was ordered. [employer] [facility] [address], NP paged because pt is confused, still tachypneic and very restless. Paged for more medications. 0530 Notified by monitor tech that O2 sats were in the 60's. Pt having apneic and shallow breathing. Pt now barely responding to sternal rub. Rapid response called. No new orders received.

## 2021-03-03 NOTE — PROGRESS NOTES
Infectious Disease Progress Note          HPI:        Ms Elizabeth Live is more awake today  Say she hurts all over but says no when asked if back hurts   Says she wants water  Denied headache, neck pain  Says she used to use IV heroin in past         Physical Exam:    Vitals:   Patient Vitals for the past 24 hrs:   Temp Pulse Resp BP SpO2   03/03/21 1117    139/89    03/03/21 0833 99.9 °F (37.7 °C) (!) 115 (!) 40 137/85 100 %   03/03/21 0800     100 %   03/03/21 0648 100.4 °F (38 °C) (!) 105 (!) 45 139/89 96 %   03/03/21 0528 (!) 101.2 °F (38.4 °C) 100 (!) 38  100 %   03/03/21 0338  (!) 108 (!) 45 (!) 148/97 96 %   03/03/21 0055  (!) 102 29 (!) 118/102 99 %   03/03/21 0050  (!) 115 30 (!) 134/102 95 %   03/02/21 2255  (!) 117 21 (!) 113/51 97 %   03/02/21 2230 100.3 °F (37.9 °C)       03/02/21 1614 99.7 °F (37.6 °C) 93 (!) 49 105/77 100 %   03/02/21 1350 100.1 °F (37.8 °C) 88 (!) 43 136/86 97 %     · GEN:calm today   · HEENT: no scleral icterus, no thrush , poor dentition,   · CV: S1, S2 heard ,  · Lungs:  Clear , no wheezing   · Abdomen: soft, non distended, no grimace to palpation, non tender   · Extremities: no edema noted   · Neuro: alert to self, follows simple commands, moves all extremities to commands, no tremors   · Skin: no rash   · MSK denied tenderness to palpation of cervical, thoracic, lumbar spine, no erythema seen on back   Labs:   Recent Results (from the past 24 hour(s))   METABOLIC PANEL, COMPREHENSIVE    Collection Time: 03/02/21 11:56 PM   Result Value Ref Range    Sodium 150 (H) 136 - 145 mmol/L    Potassium 3.8 3.5 - 5.1 mmol/L    Chloride 124 (H) 97 - 108 mmol/L    CO2 18 (L) 21 - 32 mmol/L    Anion gap 8 5 - 15 mmol/L    Glucose 119 (H) 65 - 100 mg/dL    BUN 28 (H) 6 - 20 MG/DL    Creatinine 0.53 (L) 0.55 - 1.02 MG/DL    BUN/Creatinine ratio 53 (H) 12 - 20      GFR est AA >60 >60 ml/min/1.73m2    GFR est non-AA >60 >60 ml/min/1.73m2    Calcium 9.1 8.5 - 10.1 MG/DL    Bilirubin, total 1.4 (H) 0.2 - 1.0 MG/DL    ALT (SGPT) 27 12 - 78 U/L    AST (SGOT) 39 (H) 15 - 37 U/L    Alk. phosphatase 165 (H) 45 - 117 U/L    Protein, total 6.6 6.4 - 8.2 g/dL    Albumin 2.2 (L) 3.5 - 5.0 g/dL    Globulin 4.4 (H) 2.0 - 4.0 g/dL    A-G Ratio 0.5 (L) 1.1 - 2.2     CBC WITH AUTOMATED DIFF    Collection Time: 03/02/21 11:56 PM   Result Value Ref Range    WBC 19.4 (H) 3.6 - 11.0 K/uL    RBC 3.48 (L) 3.80 - 5.20 M/uL    HGB 10.1 (L) 11.5 - 16.0 g/dL    HCT 29.4 (L) 35.0 - 47.0 %    MCV 84.5 80.0 - 99.0 FL    MCH 29.0 26.0 - 34.0 PG    MCHC 34.4 30.0 - 36.5 g/dL    RDW 14.7 (H) 11.5 - 14.5 %    PLATELET 069 (L) 206 - 400 K/uL    MPV 12.7 8.9 - 12.9 FL    NRBC 0.0 0  WBC    ABSOLUTE NRBC 0.00 0.00 - 0.01 K/uL    NEUTROPHILS 82 (H) 32 - 75 %    LYMPHOCYTES 9 (L) 12 - 49 %    MONOCYTES 7 5 - 13 %    EOSINOPHILS 0 0 - 7 %    BASOPHILS 0 0 - 1 %    IMMATURE GRANULOCYTES 2 (H) 0.0 - 0.5 %    ABS. NEUTROPHILS 15.9 (H) 1.8 - 8.0 K/UL    ABS. LYMPHOCYTES 1.8 0.8 - 3.5 K/UL    ABS. MONOCYTES 1.3 (H) 0.0 - 1.0 K/UL    ABS. EOSINOPHILS 0.0 0.0 - 0.4 K/UL    ABS. BASOPHILS 0.0 0.0 - 0.1 K/UL    ABS. IMM.  GRANS. 0.4 (H) 0.00 - 0.04 K/UL    DF AUTOMATED     GLUCOSE, POC    Collection Time: 03/03/21 12:18 AM   Result Value Ref Range    Glucose (POC) 118 (H) 65 - 100 mg/dL    Performed by Yan Kumar    GLUCOSE, POC    Collection Time: 03/03/21  5:27 AM   Result Value Ref Range    Glucose (POC) 132 (H) 65 - 100 mg/dL    Performed by Yan Kumar    ECHO ADULT COMPLETE    Collection Time: 03/03/21 10:35 AM   Result Value Ref Range    IVSd 0.56 (A) 0.6 - 0.9 cm    LVIDd 4.79 3.9 - 5.3 cm    LVIDs 2.86 cm    LVOT d 1.95 cm    LVPWd 0.84 0.6 - 0.9 cm    LVOT Peak Gradient 2.45 mmHg    LVOT Peak Velocity 78.25 cm/s    RVIDd 3.11 cm    Left Atrium Major Axis 3.20 cm    LA Area 4C 18.57 cm2    LA Vol 4C 47.83 22 - 52 mL    Aortic Valve Area by Continuity of Peak Velocity 2.20 cm2    AoV PG 4.52 mmHg    Aortic Valve Systolic Peak Velocity 106.29 cm/s    Pulmonic Valve Systolic Peak Instantaneous Gradient 5.03 mmHg    Pulmonic Valve Max Velocity 112.17 cm/s    Tapse 2.02 (A) 1.5 - 2.0 cm    Triscuspid Valve Regurgitation Peak Gradient 20.99 mmHg    TR Max Velocity 229.10 cm/s    Ao Root D 2.76 cm    LV Mass .5 67 - 162 g    LV Mass AL Index 67.6 43 - 95 g/m2    Left Atrium Minor Axis 2.03 cm    LA Vol Index 30.37 16 - 28 ml/m2    ADÁN/BSA Pk Pavel 1.4 cm2/m2       Microbiology Data:       Blood: 2/28/21  Specimen Information: Blood        Component Value Flag Ref Range Units Status   Special Requests:      Final   No Special Requests    Culture result: Abnormal       Final   Staphylococcus aureus growing in all 4 bottles drawn    Culture result:      Final   Gram pos cocci in clusters   growing in all 4 bottles drawn   CALLED TO AND READ BACK BY   Yesy Farmer on 3.1.21 at 12:13 by [email or web address]    Susceptibility    Staphylococcus aureus    Antibiotic Interpretation Value Method Comment   Clindamycin ($) Resistant Resistant ug/mL ANGELIKA    Ciprofloxacin ($) Susceptible <=0.5 ug/mL ANGELIKA    Daptomycin ($$$$$) Susceptible 0.25 ug/mL ANGELIKA    Doxycycline ($$) Susceptible <=0.5 ug/mL ANGELIKA    Erythromycin ($$$$) Resistant >=8 ug/mL ANGELIKA    Gentamicin ($) Susceptible <=0.5 ug/mL ANGELIKA    Levofloxacin ($) Susceptible 0.25 ug/mL ANGELIKA    Linezolid ($$$$$) Susceptible 2 ug/mL ANGELIKA    Moxifloxacin ($$$$) Susceptible <=0.25 ug/mL ANGELIKA    Oxacillin Susceptible 0.5 ug/mL ANGELIKA    Rifampin ($$$$) Susceptible <=0.5 ug/mL ANGELIKA Rifampin is not to be used for mono-therapy.    Tetracycline Susceptible <=1 ug/mL ANGELIKA    Trimeth/Sulfa Susceptible <=10 ug/mL ANGELIKA    Vancomycin ($) Susceptible 1 ug/mL ANGELIKA              Urine:  Special Requests:      Final   NO SPECIAL REQUESTS    [place] Count >100,000   COLONIES/mL      Final   Culture result: Abnormal       Final   STAPHYLOCOCCUS AUREUS    Susceptibility    Staphylococcus aureus    Antibiotic Interpretation Value Method Comment Ciprofloxacin ($) Susceptible <=0.5 ug/mL ANGELIKA    Gentamicin ($) Susceptible <=0.5 ug/mL ANGELIKA    Nitrofurantoin Susceptible <=16 ug/mL ANGELIKA    Oxacillin Susceptible 0.5 ug/mL ANGELIKA    Rifampin ($$$$) Susceptible <=0.5 ug/mL ANGELIKA Rifampin is not to be used for mono-therapy. Tetracycline Susceptible <=1 ug/mL ANGELIKA    Trimeth/Sulfa Susceptible <=10 ug/mL ANGELIKA    Vancomycin ($) Susceptible 1 ug/mL ANGELIKA    Levofloxacin ($) Susceptible 0.5 ug/mL ANGELIKA    Linezolid ($$$$$) Susceptible 2 ug/mL ANGELIKA    Daptomycin ($$$$$) Susceptible 0.25 ug/mL ANGELIKA    Moxifloxacin ($$$$) Susceptible <=0.25 ug/mL ANGELIKA    Doxycycline ($$) Susceptible <=0.5 ug/mL ANGELIKA        Component Value Flag Ref Range Units Status   GDH ANTIGEN Negative   NEG   Final   C. difficile toxin Negative   NEG   Final   INTERPRETATION   NTXCD   Final   NEGATIVE FOR TOXIGENIC C. DIFFICILE                Imaging:   CT chest  FINDINGS:  The lungs are clear of mass, nodule, airspace disease or edema. There is  opacification of peripheral lower lobe airways bilaterally.     The pulmonary arteries are well enhanced and no pulmonary emboli are identified.     There is no mediastinal or hilar adenopathy or mass. The aorta enhances normally  without evidence of aneurysm or dissection.     The visualized portions of the upper abdominal organs are normal.     IMPRESSION  No pulmonary process or septic emboli. Lower lobe branch bronchial  opacification likely reflecting  secretions or aspiration. CT ABD  FINDINGS: Motion artifact limits evaluation  LOWER THORAX: Left lower lobe airspace disease with a small left pleural  effusion. LIVER: No mass. BILIARY TREE: Gallbladder is within normal limits. CBD is not dilated. SPLEEN: Borderline enlarged spleen  PANCREAS: No mass or ductal dilatation. ADRENALS: Unremarkable. KIDNEYS: No mass, calculus, or hydronephrosis. STOMACH: Unremarkable. SMALL BOWEL: No dilatation or wall thickening.   COLON: No dilatation or wall thickening. APPENDIX: Unremarkable  PERITONEUM: Free fluid in the pelvis  RETROPERITONEUM: No lymphadenopathy or aortic aneurysm. REPRODUCTIVE ORGANS: Unremarkable  URINARY BLADDER: Distended urinary bladder  BONES: No destructive bone lesion. ABDOMINAL WALL: No mass or hernia. ADDITIONAL COMMENTS: N/A     IMPRESSION  1. Limited by motion.     2.  Left lower lobe airspace disease and left effusion suspicious for pneumonia.     3. Distended urinary bladder.     4. Free fluid in the pelvis. No evidence of bowel obstruction or free  intraperitoneal air    TTE  · LV: Calculated LVEF is 71%. Normal cavity size, wall thickness, systolic function (ejection fraction normal) and diastolic function. Wall motion: normal. Normal left ventricular strain. · Image quality for this study was suboptimal.  · MV: Mitral valve non-specific thickening. Mild mitral valve prolapse of the posterior leaflet. Severe mitral valve regurgitation is present. Possible vegetation present on the posterior leaflet of the mitral valve. Posterior mitral leaflet vegetation is moderately sized. Vegetation is localized on the atrial aspect of the posterior mitral leaflet. Echo Findings    Left Ventricle Normal cavity size, wall thickness, systolic function (ejection fraction normal) and diastolic function. The muscle mass is normal. The cavity shape is normal. Wall motion: normal. The calculated EF is 71%. End-systolic volume is normal. Normal left ventricular strain. Normal left ventricular diastolic pressure. End-diastolic volume is normal.   Left Atrium Normal cavity size. Right Ventricle Normal cavity size, wall thickness and global systolic function. Right Atrium Normal cavity size. Aortic Valve Aortic valve not well visualized. No stenosis and no regurgitation. Mitral Valve Mitral valve not well visualized. No stenosis. Mitral valve non-specific thickening. Mild mitral valve prolapse of the posterior leaflet.  Severe regurgitation. The mitral regurgitant jet is eccentric. There is possible vegetation on the posterior leaflet of the mitral valve. Posterior leaflet vegetation is moderately sized. The vegetation is localized on the atrial aspect of the posterior leaflet. Tricuspid Valve Normal valve structure, no stenosis and no regurgitation. Pulmonic Valve Normal valve structure, no stenosis and no regurgitation. Aorta Normal aortic root, ascending aortic, and aortic arch. Pericardium Normal pericardium and no evidence of pericardial effusion         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   Hx of IVDU , urine drug screen positive for amphetamines and THC  sepsis   + Rhinovirus, + Enterovirus on resp panel    hypernatremia   elevated inflammatory markers   mild elevation bilirubin and AST   altered mental status, encephalopathy   history of overdose per chart  thrombocytopenia  chart reported history of hepatitis C   chart reported history of anxiety, aggressive outbursts, schizoaffective disorder        Plan: Will stop IV Vancomycin and Zosyn   Start Cefazolin   TTE reported as severe  MV regurgitation and recommend CTS consult ( will defer  KELLY to them)   Mentation is improved today but if worsens, recommend MRI ivette to assess for emboli to CNS  If back pain or any findings, concerning for discitis/osteomyelitis, check imaging of cervical, thoracic lumbosacral spine. At present non tender back exam today   Repeat blood cx Q 24-48 hours pending clearance   Rhinovirus and Enterovirus detected on respiratory panel -- supportive care   Hypernatremia management per primary team   Withdrawal monitoring and  Management per primary team           Thank for the opportunity to participate in the care of this patient. Please contact with questions or concerns.            Rylee Garza,   1:33 PM

## 2021-03-03 NOTE — PERIOP NOTES
TRANSFER - IN REPORT:    Verbal report received from [facility] RN(name) on Tanya Rodriguez  being received from [facility]) for ordered procedure      Report consisted of patients Situation, Background, Assessment and   Recommendations(SBAR). Information from the following report(s) SBAR, Kardex, ED Summary, Procedure Summary, Intake/Output, MAR and Recent Results was reviewed with the receiving nurse. Opportunity for questions and clarification was provided. Assessment completed upon patients arrival to unit and care assumed.    To be coming for central line isertion

## 2021-03-03 NOTE — PROGRESS NOTES
Problem: Falls - Risk of  Goal: *Absence of Falls  Description: Document Heriberto Dinh Fall Risk and appropriate interventions in the flowsheet. Outcome: Progressing Towards Goal  Note: Fall Risk Interventions:       Mentation Interventions: Bed/chair exit alarm, Adequate sleep, hydration, pain control    Medication Interventions: Bed/chair exit alarm    Elimination Interventions: Bed/chair exit alarm, Stay With Me (per policy), Patient to call for help with toileting needs, Call light in reach              Problem: Pressure Injury - Risk of  Goal: *Prevention of pressure injury  Description: Document Terrance Scale and appropriate interventions in the flowsheet.   Outcome: Progressing Towards Goal  Note: Pressure Injury Interventions:  Sensory Interventions: Assess changes in LOC, Keep linens dry and wrinkle-free    Moisture Interventions: Absorbent underpads, Apply protective barrier, creams and emollients, Internal/External urinary devices    Activity Interventions: Assess need for specialty bed, PT/OT evaluation    Mobility Interventions: Assess need for specialty bed, HOB 30 degrees or less, Pressure redistribution bed/mattress (bed type)    Nutrition Interventions: Document food/fluid/supplement intake, Offer support with meals,snacks and hydration    Friction and Shear Interventions: Apply protective barrier, creams and emollients, HOB 30 degrees or less, Transferring/repositioning devices

## 2021-03-03 NOTE — PROGRESS NOTES
Full consult to follow echo reviewed and clinical course reviewed  Consult dictated  Continue medical management for now  Repeat blood cultures   Discussed with Dr Hilary Grajeda

## 2021-03-03 NOTE — PERIOP NOTES
TRANSFER - OUT REPORT:    Verbal report given to [facility] RN(name) on Jovon Dorsey  being transferred to [facility]) for routine progression of care       Report consisted of patients Situation, Background, Assessment and   Recommendations(SBAR). Information from the following report(s) SBAR, Kardex and Procedure Summary was reviewed with the receiving nurse. Lines:   Peripheral IV 03/02/21 Left;Posterior Wrist (Active)   Site Assessment Clean, dry, & intact 03/03/21 1117   Phlebitis Assessment 0 03/03/21 0833   Infiltration Assessment 1 03/03/21 1117   Dressing Status Clean, dry, & intact 03/03/21 1117   Dressing Type Tape 03/03/21 1117   Hub Color/Line Status Infiltrated 03/03/21 1117       Peripheral IV 03/03/21 Anterior;Left; Inner Forearm (Active)   Site Assessment Clean, dry, & intact 03/03/21 1343   Phlebitis Assessment 0 03/03/21 1343   Infiltration Assessment 0 03/03/21 1343   Dressing Status New;Clean, dry, & intact 03/03/21 1343   Dressing Type Transparent 03/03/21 1343   Hub Color/Line Status Blue 03/03/21 1343   Action Taken Open ports on tubing capped 03/03/21 1343   Alcohol Cap Used Yes 03/03/21 1343        Opportunity for questions and clarification was provided.       Patient transported with:   Monitor  Registered Nurse  Tech

## 2021-03-03 NOTE — PROGRESS NOTES
Central Line Note  Indication: Inadequate venous access, Endocarditis    Risks, benefits, alternatives explained and patient agrees to proceed. Patient positioned in Trendelenburg. 7-Step Sterility Protocol followed. (cap, mask sterile gown, sterile gloves, large sterile sheet, hand hygiene, 2% chlorhexidine for cutaneous antisepsis)  5 mL 1% Lidocaine placed at insertion site. Live Ultrasound scan performed. Right internal jugular cannulated x 1 attempt(s) utilizing the Seldinger technique. Venous cannulation confirmed with column drop test prior to dilation. Catheter secured & Biopatch applied. Sterile Tegaderm placed. 8.5 Fr Quad Lumen Catheter  CXR pending.

## 2021-03-03 NOTE — PROGRESS NOTES
[facility]                                                                                          Hospitalist Progress Note  Dona York MD  Answering service: [phone] from in house phone        Date of Service:  3/3/2021  NAME:  Puja Casiano  :  1990  MRN:  [id number]      Please note that this dictation was completed with [employer], the computer voice recognition software. Quite often unanticipated grammatical, syntax, homophones, and other interpretive errors are inadvertently transcribed by the computer software. Please disregard these errors. Please excuse any errors that have escaped final proofreading. Admission Summary:   Raven Valera is a 31 y.o.  female who presents with above symptoms. Started suddenly. Progressively worse. Severe now. Patient with altered mental status not willing and unable to provide information. Mother reports she is adult but lives at her house. Not sure when she used any type of IV drugs last time. Possibly yesterday. Reports she has been dealing with IV drug abuse for many years. Apparently after an . Mother reports she has been multiple times in, due to narcotic overdose, reversed by nasal Narcan. She also has history of amphetamine use, unknown last use. Last 4 days unable to ambulate. She has been suffering from severe myalgia and fever occasional cough, negative for Covid no exposure. Mother reports she was negative as well. Patient and mother report, never had similar symptoms in the past.  She takes Suboxone and Xanax as needed for anxiety. Not have any other prescriptions.       Interval history / Subjective:   Patient seen and examined today. Labs imaging and chart reviewed. Mental status is much better today. She is awake and trying to talk. Mother at bedside. We will start her sips of clear liquids.   Echo shows vegetation, consult CT surgery  Change IV fluids to treat hyper natremia     Assessment & Plan:     Sepsis due to staph aureus bacteremia POA   With evidence of staph aureus bacteremia 4 x 4, and also the presence of leukocytosis with elevated CRP and tachycardia  Troponins elevated as well but flat  IV drug abuser, per mother shot heroin few days back  Started on vancomycin and Zosyn, changed to cefepime and Flagyl by ID  Now changed to cefazolin  Repeat blood cultures   CT chest shows a may be possibility of some aspiration  In the bronchi, but no evident pneumonia  Respiratory PCR negative including SARS  Echocardiogram shows a posterior mitral valve vegetation and severe MR. CT surgery consulted      Diarrhea. C. difficile negative  CT abdomen ordered    Hypernatremia and hypokalemia  Due to free water loss from a diarrhea. Change IV fluids to dextrose   Hyponatremia improving slowly  Allow sips of clear liquids now    Altered mental status, improving  Encephalopathy from sepsis and likely withdrawal from drugs , heroin withdrawal and benzo withdrawal   But cannot rule out any septic emboli in the brain, will order MRI of the brain  CT of the head without any acute abnormality. IV Ativan 2 mg and 4 mg as needed        Possibility of her benzo withdrawal and heroin withdrawal and/or amphetamine overdose  Started patient back on Xanax and her Suboxone  Symptomatic treatment needed  IV Ativan as needed. See above    Hypokalemia  IV fluids with potassium  Resolved      Start clear liquids      Thrombocytopenia  Restart Lovenox prophylactic    Body mass index is 18.4 kg/m². History of hepatitis C.         Talk to the mother at bedside today      Code status: FULL  DVT prophylaxis: scd     Care Plan discussed with: Patient/Family  Anticipated Disposition: Home w/Family  Anticipated Discharge: Greater than 48 hours     Hospital Problems  Never Reviewed          Codes Class Noted POA    [facility] ([facility] ICD-10-CM: A41.9  ICD-9-CM: 038.9, 995.91  2/28/2021 Unknown Review of Systems:   A comprehensive review of systems was negative except for that written in the HPI. Vital Signs:    Last 24hrs VS reviewed since prior progress note. Most recent are:  Visit Vitals  /89   Pulse (!) 115   Temp 99.9 °F (37.7 °C)   Resp (!) 40   Ht 5' 6\" (1.676 m)   Wt 51.7 kg (114 lb)   SpO2 100%   BMI 18.40 kg/m²         Intake/Output Summary (Last 24 hours) at 3/3/2021 1345  Last data filed at 3/3/2021 9597  Gross per 24 hour   Intake 800 ml   Output 1450 ml   Net -650 ml        Physical Examination:     I had a face to face encounter with this patient and independently examined them on 03/03/21 as outlined below:        Constitutional:  Opening eyes on my stimulus and trying to talk   ENT:  Oral mucosa moist, oropharynx benign. Resp:  CTA bilaterally. No wheezing/rhonchi/rales. No accessory muscle use   CV:  Regular rhythm, normal rate, no murmurs, gallops, rubs    GI:  Soft, non distended, non tender. normoactive bowel sounds, no hepatosplenomegaly     Musculoskeletal:  Moving around in the bed    Neurologic:  Alert today not agitated            Data Review:    Review and/or order of clinical lab test      Labs:     Recent Labs     03/02/21 2356 03/02/21  1041   WBC 19.4* 16.4*   HGB 10.1* 10.3*   HCT 29.4* 30.2*   * 62*     Recent Labs     03/02/21 2356 03/02/21  1041 03/02/21  0613   * 152* 151*   K 3.8 2.7* 2.8*   * 122* 121*   CO2 18* 19* 20*   BUN 28* 34* 35*   CREA 0.53* 0.55 0.51*   * 108* 116*   CA 9.1 8.6 9.3     Recent Labs     03/02/21 2356 03/02/21  0613 03/01/21  0510   ALT 27 30 34   * 183* 177*   TBILI 1.4* 1.3* 2.2*   TP 6.6 6.3* 6.0*   ALB 2.2* 2.2* 2.1*   GLOB 4.4* 4.1* 3.9     No results for input(s): INR, PTP, APTT, INREXT, INREXT in the last 72 hours. No results for input(s): FE, TIBC, PSAT, FERR in the last 72 hours.    No results found for: FOL, RBCF   No results for input(s): PH, PCO2, PO2 in the last 72 hours.   Recent Labs     03/01/21  0510 02/28/21 2034   TROIQ 0.18* 0.18*     No results found for: CHOL, CHOLX, CHLST, CHOLV, HDL, HDLP, LDL, LDLC, DLDLP, TGLX, TRIGL, TRIGP, CHHD, CHHDX  Lab Results   Component Value Date/Time    Glucose (POC) 132 (H) 03/03/2021 05:27 AM    Glucose (POC) 118 (H) 03/03/2021 12:18 AM     Lab Results   Component Value Date/Time    Color DARK YELLOW 02/28/2021 06:12 PM    Appearance CLEAR 02/28/2021 06:12 PM    Specific gravity 1.020 02/28/2021 06:12 PM    Specific gravity 1.016 02/28/2021 06:00 AM    pH (UA) 6.5 02/28/2021 06:12 PM    Protein 100 (A) 02/28/2021 06:12 PM    Glucose Negative 02/28/2021 06:12 PM    Ketone 15 (A) 02/28/2021 06:12 PM    Bilirubin Negative 02/28/2021 06:00 AM    Urobilinogen 2.0 (H) 02/28/2021 06:12 PM    Nitrites Positive (A) 02/28/2021 06:12 PM    Leukocyte Esterase MODERATE (A) 02/28/2021 06:12 PM    Epithelial cells FEW 02/28/2021 06:12 PM    Bacteria 1+ (A) 02/28/2021 06:12 PM    WBC 20-50 02/28/2021 06:12 PM    RBC >100 (H) 02/28/2021 06:12 PM         Medications Reviewed:     Current Facility-Administered Medications   Medication Dose Route Frequency    ceFAZolin (ANCEF) 2 g in sterile water (preservative free) 20 mL IV syringe  2 g IntraVENous Q8H    dextrose 5% infusion  200 mL/hr IntraVENous CONTINUOUS    LORazepam (ATIVAN) injection 4 mg  4 mg IntraVENous Q4H PRN    LORazepam (ATIVAN) injection 2 mg  2 mg IntraVENous Q2H PRN    influenza vaccine 2020-21 (6 mos+)(PF) (FLUARIX/FLULAVAL/FLUZONE QUAD) injection 0.5 mL  0.5 mL IntraMUSCular PRIOR TO DISCHARGE    buprenorphine-naloxone (SUBOXONE) 8-2mg SL tablet  1 Tab SubLINGual BID    ketorolac (TORADOL) injection 15 mg  15 mg IntraVENous Q6H PRN    sodium chloride (NS) flush 5-40 mL  5-40 mL IntraVENous Q8H    sodium chloride (NS) flush 5-40 mL  5-40 mL IntraVENous PRN    acetaminophen (TYLENOL) tablet 650 mg  650 mg Oral Q6H PRN    Or    acetaminophen (TYLENOL) suppository 650 mg  650 mg Rectal Q6H PRN    polyethylene glycol (MIRALAX) packet 17 g  17 g Oral DAILY PRN    promethazine (PHENERGAN) tablet 12.5 mg  12.5 mg Oral Q6H PRN    Or    ondansetron (ZOFRAN) injection 4 mg  4 mg IntraVENous Q6H PRN    enoxaparin (LOVENOX) injection 40 mg  40 mg SubCUTAneous DAILY     ______________________________________________________________________  EXPECTED LENGTH OF STAY: 4d 19h  ACTUAL LENGTH OF STAY:          3                 Gaby Beckham MD

## 2021-03-04 ENCOUNTER — APPOINTMENT (OUTPATIENT)
Dept: MRI IMAGING | Age: 31
DRG: 812 | End: 2021-03-04
Attending: INTERNAL MEDICINE
Payer: MEDICAID

## 2021-03-04 LAB
ANION GAP SERPL CALC-SCNC: 6 MMOL/L (ref 5–15)
ANION GAP SERPL CALC-SCNC: 8 MMOL/L (ref 5–15)
BASOPHILS # BLD: 0 K/UL (ref 0–0.1)
BASOPHILS NFR BLD: 0 % (ref 0–1)
BUN SERPL-MCNC: 15 MG/DL (ref 6–20)
BUN SERPL-MCNC: 9 MG/DL (ref 6–20)
BUN/CREAT SERPL: 17 (ref 12–20)
BUN/CREAT SERPL: 29 (ref 12–20)
CALCIUM SERPL-MCNC: 7.5 MG/DL (ref 8.5–10.1)
CALCIUM SERPL-MCNC: 8.1 MG/DL (ref 8.5–10.1)
CHLORIDE SERPL-SCNC: 109 MMOL/L (ref 97–108)
CHLORIDE SERPL-SCNC: 111 MMOL/L (ref 97–108)
CO2 SERPL-SCNC: 23 MMOL/L (ref 21–32)
CO2 SERPL-SCNC: 26 MMOL/L (ref 21–32)
CREAT SERPL-MCNC: 0.52 MG/DL (ref 0.55–1.02)
CREAT SERPL-MCNC: 0.52 MG/DL (ref 0.55–1.02)
DIFFERENTIAL METHOD BLD: ABNORMAL
EOSINOPHIL # BLD: 0.2 K/UL (ref 0–0.4)
EOSINOPHIL NFR BLD: 1 % (ref 0–7)
ERYTHROCYTE [DISTWIDTH] IN BLOOD BY AUTOMATED COUNT: 14.2 % (ref 11.5–14.5)
GLUCOSE SERPL-MCNC: 101 MG/DL (ref 65–100)
GLUCOSE SERPL-MCNC: 134 MG/DL (ref 65–100)
HCT VFR BLD AUTO: 26.4 % (ref 35–47)
HGB BLD-MCNC: 9.1 G/DL (ref 11.5–16)
IMM GRANULOCYTES # BLD AUTO: 0.3 K/UL (ref 0–0.04)
IMM GRANULOCYTES NFR BLD AUTO: 2 % (ref 0–0.5)
LYMPHOCYTES # BLD: 2.2 K/UL (ref 0.8–3.5)
LYMPHOCYTES NFR BLD: 13 % (ref 12–49)
MAGNESIUM SERPL-MCNC: 1.8 MG/DL (ref 1.6–2.4)
MCH RBC QN AUTO: 29.3 PG (ref 26–34)
MCHC RBC AUTO-ENTMCNC: 34.5 G/DL (ref 30–36.5)
MCV RBC AUTO: 84.9 FL (ref 80–99)
MONOCYTES # BLD: 0.9 K/UL (ref 0–1)
MONOCYTES NFR BLD: 5 % (ref 5–13)
NEUTS SEG # BLD: 13.3 K/UL (ref 1.8–8)
NEUTS SEG NFR BLD: 79 % (ref 32–75)
NRBC # BLD: 0 K/UL (ref 0–0.01)
NRBC BLD-RTO: 0 PER 100 WBC
PHOSPHATE SERPL-MCNC: 2.9 MG/DL (ref 2.6–4.7)
PLATELET # BLD AUTO: 117 K/UL (ref 150–400)
PMV BLD AUTO: 11 FL (ref 8.9–12.9)
POTASSIUM SERPL-SCNC: 2.4 MMOL/L (ref 3.5–5.1)
POTASSIUM SERPL-SCNC: 3 MMOL/L (ref 3.5–5.1)
RBC # BLD AUTO: 3.11 M/UL (ref 3.8–5.2)
SODIUM SERPL-SCNC: 141 MMOL/L (ref 136–145)
SODIUM SERPL-SCNC: 142 MMOL/L (ref 136–145)
WBC # BLD AUTO: 16.9 K/UL (ref 3.6–11)

## 2021-03-04 PROCEDURE — 99233 SBSQ HOSP IP/OBS HIGH 50: CPT | Performed by: THORACIC SURGERY (CARDIOTHORACIC VASCULAR SURGERY)

## 2021-03-04 PROCEDURE — 74011250636 HC RX REV CODE- 250/636: Performed by: NURSE PRACTITIONER

## 2021-03-04 PROCEDURE — 74011000250 HC RX REV CODE- 250: Performed by: INTERNAL MEDICINE

## 2021-03-04 PROCEDURE — 84100 ASSAY OF PHOSPHORUS: CPT

## 2021-03-04 PROCEDURE — 87186 SC STD MICRODIL/AGAR DIL: CPT

## 2021-03-04 PROCEDURE — 87040 BLOOD CULTURE FOR BACTERIA: CPT

## 2021-03-04 PROCEDURE — 87147 CULTURE TYPE IMMUNOLOGIC: CPT

## 2021-03-04 PROCEDURE — 87077 CULTURE AEROBIC IDENTIFY: CPT

## 2021-03-04 PROCEDURE — 65660000001 HC RM ICU INTERMED STEPDOWN

## 2021-03-04 PROCEDURE — 36415 COLL VENOUS BLD VENIPUNCTURE: CPT

## 2021-03-04 PROCEDURE — 85025 COMPLETE CBC W/AUTO DIFF WBC: CPT

## 2021-03-04 PROCEDURE — 74011250637 HC RX REV CODE- 250/637: Performed by: FAMILY MEDICINE

## 2021-03-04 PROCEDURE — 99232 SBSQ HOSP IP/OBS MODERATE 35: CPT | Performed by: INTERNAL MEDICINE

## 2021-03-04 PROCEDURE — 74011250636 HC RX REV CODE- 250/636: Performed by: FAMILY MEDICINE

## 2021-03-04 PROCEDURE — 74011250636 HC RX REV CODE- 250/636: Performed by: INTERNAL MEDICINE

## 2021-03-04 PROCEDURE — 80048 BASIC METABOLIC PNL TOTAL CA: CPT

## 2021-03-04 PROCEDURE — 83735 ASSAY OF MAGNESIUM: CPT

## 2021-03-04 PROCEDURE — 70551 MRI BRAIN STEM W/O DYE: CPT

## 2021-03-04 PROCEDURE — 74011250637 HC RX REV CODE- 250/637: Performed by: NURSE PRACTITIONER

## 2021-03-04 RX ORDER — POTASSIUM CHLORIDE 29.8 MG/ML
20 INJECTION INTRAVENOUS ONCE
Status: DISCONTINUED | OUTPATIENT
Start: 2021-03-04 | End: 2021-03-04 | Stop reason: SDUPTHER

## 2021-03-04 RX ORDER — POTASSIUM CHLORIDE 7.45 MG/ML
10 INJECTION INTRAVENOUS
Status: DISCONTINUED | OUTPATIENT
Start: 2021-03-04 | End: 2021-03-04

## 2021-03-04 RX ORDER — DEXTROSE, SODIUM CHLORIDE, AND POTASSIUM CHLORIDE 5; .45; .3 G/100ML; G/100ML; G/100ML
INJECTION INTRAVENOUS CONTINUOUS
Status: DISCONTINUED | OUTPATIENT
Start: 2021-03-04 | End: 2021-03-06

## 2021-03-04 RX ORDER — POTASSIUM CHLORIDE 14.9 MG/ML
10 INJECTION INTRAVENOUS
Status: COMPLETED | OUTPATIENT
Start: 2021-03-04 | End: 2021-03-04

## 2021-03-04 RX ORDER — POTASSIUM CHLORIDE 29.8 MG/ML
20 INJECTION INTRAVENOUS
Status: COMPLETED | OUTPATIENT
Start: 2021-03-04 | End: 2021-03-04

## 2021-03-04 RX ADMIN — Medication 10 ML: at 21:23

## 2021-03-04 RX ADMIN — POTASSIUM CHLORIDE, DEXTROSE MONOHYDRATE AND SODIUM CHLORIDE: 300; 5; 450 INJECTION, SOLUTION INTRAVENOUS at 11:06

## 2021-03-04 RX ADMIN — ENOXAPARIN SODIUM 40 MG: 40 INJECTION SUBCUTANEOUS at 08:30

## 2021-03-04 RX ADMIN — POTASSIUM CHLORIDE 20 MEQ: 29.8 INJECTION, SOLUTION INTRAVENOUS at 08:30

## 2021-03-04 RX ADMIN — WATER 2 G: 1 INJECTION INTRAMUSCULAR; INTRAVENOUS; SUBCUTANEOUS at 13:34

## 2021-03-04 RX ADMIN — WATER 2 G: 1 INJECTION INTRAMUSCULAR; INTRAVENOUS; SUBCUTANEOUS at 05:25

## 2021-03-04 RX ADMIN — Medication 10 ML: at 05:26

## 2021-03-04 RX ADMIN — BUPRENORPHINE HYDROCHLORIDE AND NALOXONE HYDROCHLORIDE DIHYDRATE 1 TABLET: 8; 2 TABLET SUBLINGUAL at 08:30

## 2021-03-04 RX ADMIN — POTASSIUM CHLORIDE 20 MEQ: 29.8 INJECTION, SOLUTION INTRAVENOUS at 06:16

## 2021-03-04 RX ADMIN — KETOROLAC TROMETHAMINE 15 MG: 30 INJECTION, SOLUTION INTRAMUSCULAR at 23:20

## 2021-03-04 RX ADMIN — WATER 2 G: 1 INJECTION INTRAMUSCULAR; INTRAVENOUS; SUBCUTANEOUS at 21:21

## 2021-03-04 RX ADMIN — Medication 10 ML: at 13:34

## 2021-03-04 RX ADMIN — ACETAMINOPHEN 650 MG: 325 TABLET ORAL at 20:19

## 2021-03-04 RX ADMIN — Medication 3 MG: at 23:20

## 2021-03-04 RX ADMIN — POTASSIUM CHLORIDE, DEXTROSE MONOHYDRATE AND SODIUM CHLORIDE: 300; 5; 450 INJECTION, SOLUTION INTRAVENOUS at 18:22

## 2021-03-04 RX ADMIN — POTASSIUM CHLORIDE 10 MEQ: 14.9 INJECTION, SOLUTION INTRAVENOUS at 11:03

## 2021-03-04 RX ADMIN — LORAZEPAM 2 MG: 2 INJECTION INTRAMUSCULAR; INTRAVENOUS at 17:11

## 2021-03-04 RX ADMIN — DEXTROSE MONOHYDRATE 200 ML/HR: 50 INJECTION, SOLUTION INTRAVENOUS at 05:26

## 2021-03-04 RX ADMIN — BUPRENORPHINE HYDROCHLORIDE AND NALOXONE HYDROCHLORIDE DIHYDRATE 1 TABLET: 8; 2 TABLET SUBLINGUAL at 17:10

## 2021-03-04 RX ADMIN — KETOROLAC TROMETHAMINE 15 MG: 30 INJECTION, SOLUTION INTRAMUSCULAR at 14:31

## 2021-03-04 RX ADMIN — LORAZEPAM 4 MG: 2 INJECTION INTRAMUSCULAR; INTRAVENOUS at 05:25

## 2021-03-04 RX ADMIN — POTASSIUM CHLORIDE 10 MEQ: 14.9 INJECTION, SOLUTION INTRAVENOUS at 11:06

## 2021-03-04 RX ADMIN — DEXTROSE MONOHYDRATE 200 ML/HR: 50 INJECTION, SOLUTION INTRAVENOUS at 00:46

## 2021-03-04 NOTE — PROGRESS NOTES
Infectious Disease Progress Note          HPI  Ms Marcel Irby was sleeping when I went to see her  D/W with her mother at bedside           Physical Exam:    Vitals:   Patient Vitals for the past 24 hrs:   Temp Pulse Resp BP SpO2   03/04/21 0837 99.5 °F (37.5 °C) (!) 103 (!) 32 118/62 96 %   03/04/21 0712 100 °F (37.8 °C) (!) 109 (!) 37 122/76 97 %   03/04/21 0339 98.8 °F (37.1 °C) 86 28 107/66 98 %   03/03/21 2242 99.9 °F (37.7 °C) (!) 105 (!) 36 123/65 97 %   03/03/21 1718  97 (!) 37 111/89 99 %   03/03/21 1648  (!) 105 (!) 33 135/75 98 %     · GEN sleeping  · HEENT: no scleral icterus, no thrush , poor dentition, cannot fully assess oral cavity but mouth was partially open , + RIJ  · CV: S1, S2 heard ,  · Lungs:  Coarse anteriorly   · Abdomen: soft, non distended, no grimace to palpation, non tender   · Extremities: no edema noted   · Neuro: sleeping but wakes up to calling her name , goes back to sleep   · Skin: no rash  On face extremities   ·   Labs:   Recent Results (from the past 24 hour(s))   CBC WITH AUTOMATED DIFF    Collection Time: 03/04/21  3:51 AM   Result Value Ref Range    WBC 16.9 (H) 3.6 - 11.0 K/uL    RBC 3.11 (L) 3.80 - 5.20 M/uL    HGB 9.1 (L) 11.5 - 16.0 g/dL    HCT 26.4 (L) 35.0 - 47.0 %    MCV 84.9 80.0 - 99.0 FL    MCH 29.3 26.0 - 34.0 PG    MCHC 34.5 30.0 - 36.5 g/dL    RDW 14.2 11.5 - 14.5 %    PLATELET 774 (L) 718 - 400 K/uL    MPV 11.0 8.9 - 12.9 FL    NRBC 0.0 0  WBC    ABSOLUTE NRBC 0.00 0.00 - 0.01 K/uL    NEUTROPHILS 79 (H) 32 - 75 %    LYMPHOCYTES 13 12 - 49 %    MONOCYTES 5 5 - 13 %    EOSINOPHILS 1 0 - 7 %    BASOPHILS 0 0 - 1 %    IMMATURE GRANULOCYTES 2 (H) 0.0 - 0.5 %    ABS. NEUTROPHILS 13.3 (H) 1.8 - 8.0 K/UL    ABS. LYMPHOCYTES 2.2 0.8 - 3.5 K/UL    ABS. MONOCYTES 0.9 0.0 - 1.0 K/UL    ABS. EOSINOPHILS 0.2 0.0 - 0.4 K/UL    ABS. BASOPHILS 0.0 0.0 - 0.1 K/UL    ABS. IMM.  GRANS. 0.3 (H) 0.00 - 0.04 K/UL    DF AUTOMATED     METABOLIC PANEL, BASIC    Collection Time: 03/04/21 3:51 AM   Result Value Ref Range    Sodium 142 136 - 145 mmol/L    Potassium 2.4 (LL) 3.5 - 5.1 mmol/L    Chloride 111 (H) 97 - 108 mmol/L    CO2 23 21 - 32 mmol/L    Anion gap 8 5 - 15 mmol/L    Glucose 134 (H) 65 - 100 mg/dL    BUN 15 6 - 20 MG/DL    Creatinine 0.52 (L) 0.55 - 1.02 MG/DL    BUN/Creatinine ratio 29 (H) 12 - 20      GFR est AA >60 >60 ml/min/1.73m2    GFR est non-AA >60 >60 ml/min/1.73m2    Calcium 8.1 (L) 8.5 - 10.1 MG/DL   MAGNESIUM    Collection Time: 03/04/21  3:51 AM   Result Value Ref Range    Magnesium 1.8 1.6 - 2.4 mg/dL   PHOSPHORUS    Collection Time: 03/04/21  3:51 AM   Result Value Ref Range    Phosphorus 2.9 2.6 - 4.7 MG/DL       Microbiology Data:       Blood: 2/28/21  Specimen Information: Blood        Component Value Flag Ref Range Units Status   Special Requests:      Final   No Special Requests    Culture result: Abnormal       Final   Staphylococcus aureus growing in all 4 bottles drawn    Culture result:      Final   Gram pos cocci in clusters   growing in all 4 bottles drawn   CALLED TO AND READ BACK BY   Yesy Farmer on 3.1.21 at 12:13 by [email or web address]    Susceptibility    Staphylococcus aureus    Antibiotic Interpretation Value Method Comment   Clindamycin ($) Resistant Resistant ug/mL ANGELIKA    Ciprofloxacin ($) Susceptible <=0.5 ug/mL ANGELIKA    Daptomycin ($$$$$) Susceptible 0.25 ug/mL ANGELIKA    Doxycycline ($$) Susceptible <=0.5 ug/mL ANGELIKA    Erythromycin ($$$$) Resistant >=8 ug/mL ANGELIKA    Gentamicin ($) Susceptible <=0.5 ug/mL ANGELIKA    Levofloxacin ($) Susceptible 0.25 ug/mL NAGELIKA    Linezolid ($$$$$) Susceptible 2 ug/mL ANGELIKA    Moxifloxacin ($$$$) Susceptible <=0.25 ug/mL ANGELIKA    Oxacillin Susceptible 0.5 ug/mL ANGELIKA    Rifampin ($$$$) Susceptible <=0.5 ug/mL ANGELIKA Rifampin is not to be used for mono-therapy.    Tetracycline Susceptible <=1 ug/mL ANGELIKA    Trimeth/Sulfa Susceptible <=10 ug/mL ANGELIKA    Vancomycin ($) Susceptible 1 ug/mL ANGELIKA              Urine:  Special Requests:      Final   NO SPECIAL REQUESTS    Pryor Count >100,000   COLONIES/mL      Final   Culture result: Abnormal       Final   STAPHYLOCOCCUS AUREUS    Susceptibility    Staphylococcus aureus    Antibiotic Interpretation Value Method Comment   Ciprofloxacin ($) Susceptible <=0.5 ug/mL ANGELIKA    Gentamicin ($) Susceptible <=0.5 ug/mL ANGELIKA    Nitrofurantoin Susceptible <=16 ug/mL ANGELIKA    Oxacillin Susceptible 0.5 ug/mL ANGELIKA    Rifampin ($$$$) Susceptible <=0.5 ug/mL ANGELIKA Rifampin is not to be used for mono-therapy. Tetracycline Susceptible <=1 ug/mL ANGELIKA    Trimeth/Sulfa Susceptible <=10 ug/mL ANGELIKA    Vancomycin ($) Susceptible 1 ug/mL ANGELIKA    Levofloxacin ($) Susceptible 0.5 ug/mL ANGELIKA    Linezolid ($$$$$) Susceptible 2 ug/mL ANGELIKA    Daptomycin ($$$$$) Susceptible 0.25 ug/mL ANGELIKA    Moxifloxacin ($$$$) Susceptible <=0.25 ug/mL ANGELIKA    Doxycycline ($$) Susceptible <=0.5 ug/mL ANGELIKA        Component Value Flag Ref Range Units Status   GDH ANTIGEN Negative   NEG   Final   C. difficile toxin Negative   NEG   Final   INTERPRETATION   NTXCD   Final   NEGATIVE FOR TOXIGENIC C. DIFFICILE                Imaging:   CT chest  FINDINGS:  The lungs are clear of mass, nodule, airspace disease or edema. There is  opacification of peripheral lower lobe airways bilaterally.     The pulmonary arteries are well enhanced and no pulmonary emboli are identified.     There is no mediastinal or hilar adenopathy or mass. The aorta enhances normally  without evidence of aneurysm or dissection.     The visualized portions of the upper abdominal organs are normal.     IMPRESSION  No pulmonary process or septic emboli. Lower lobe branch bronchial  opacification likely reflecting  secretions or aspiration. CT ABD  FINDINGS: Motion artifact limits evaluation  LOWER THORAX: Left lower lobe airspace disease with a small left pleural  effusion. LIVER: No mass. BILIARY TREE: Gallbladder is within normal limits. CBD is not dilated.   SPLEEN: Borderline enlarged spleen  PANCREAS: No mass or ductal dilatation. ADRENALS: Unremarkable. KIDNEYS: No mass, calculus, or hydronephrosis. STOMACH: Unremarkable. SMALL BOWEL: No dilatation or wall thickening. COLON: No dilatation or wall thickening. APPENDIX: Unremarkable  PERITONEUM: Free fluid in the pelvis  RETROPERITONEUM: No lymphadenopathy or aortic aneurysm. REPRODUCTIVE ORGANS: Unremarkable  URINARY BLADDER: Distended urinary bladder  BONES: No destructive bone lesion. ABDOMINAL WALL: No mass or hernia. ADDITIONAL COMMENTS: N/A     IMPRESSION  1. Limited by motion.     2.  Left lower lobe airspace disease and left effusion suspicious for pneumonia.     3. Distended urinary bladder.     4. Free fluid in the pelvis. No evidence of bowel obstruction or free  intraperitoneal air    TTE  · LV: Calculated LVEF is 71%. Normal cavity size, wall thickness, systolic function (ejection fraction normal) and diastolic function. Wall motion: normal. Normal left ventricular strain. · Image quality for this study was suboptimal.  · MV: Mitral valve non-specific thickening. Mild mitral valve prolapse of the posterior leaflet. Severe mitral valve regurgitation is present. Possible vegetation present on the posterior leaflet of the mitral valve. Posterior mitral leaflet vegetation is moderately sized. Vegetation is localized on the atrial aspect of the posterior mitral leaflet. Echo Findings    Left Ventricle Normal cavity size, wall thickness, systolic function (ejection fraction normal) and diastolic function. The muscle mass is normal. The cavity shape is normal. Wall motion: normal. The calculated EF is 71%. End-systolic volume is normal. Normal left ventricular strain. Normal left ventricular diastolic pressure. End-diastolic volume is normal.   Left Atrium Normal cavity size. Right Ventricle Normal cavity size, wall thickness and global systolic function. Right Atrium Normal cavity size. Aortic Valve Aortic valve not well visualized.  No stenosis and no regurgitation. Mitral Valve Mitral valve not well visualized. No stenosis. Mitral valve non-specific thickening. Mild mitral valve prolapse of the posterior leaflet. Severe regurgitation. The mitral regurgitant jet is eccentric. There is possible vegetation on the posterior leaflet of the mitral valve. Posterior leaflet vegetation is moderately sized. The vegetation is localized on the atrial aspect of the posterior leaflet. Tricuspid Valve Normal valve structure, no stenosis and no regurgitation. Pulmonic Valve Normal valve structure, no stenosis and no regurgitation. Aorta Normal aortic root, ascending aortic, and aortic arch. Pericardium Normal pericardium and no evidence of pericardial effusion         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   Hx of IVDU , urine drug screen positive for amphetamines and THC  sepsis   + Rhinovirus, + Enterovirus on resp panel    hypernatremia   elevated inflammatory markers   mild elevation bilirubin and AST   altered mental status, encephalopathy   history of overdose per chart  thrombocytopenia  chart reported history of hepatitis C   chart reported history of anxiety, aggressive outbursts, schizoaffective disorder        Plan:    Renally dosed Cefazolin   TTE findings discussed with CTS  Blood cx pending 3/4  MRI brain pending   Repeat blood cx Q 24-48 hours pending clearance   Rhinovirus and Enterovirus detected on respiratory panel -- supportive care   Hypokalemia per primary team   Withdrawal monitoring and  Management per primary team     D/W patients mother who is at bedside       Thank for the opportunity to participate in the care of this patient. Please contact with questions or concerns.            Rylee Garza,   1:04 PM

## 2021-03-04 NOTE — ROUTINE PROCESS
Patient agitated off/on throughout shift prn Ativan given as needed. Patient Tmax 100.0 and attempted to give patient rectal tylenol. Patient and mother refused due to patient having frequent stools. Nurse attempted to educate patient and mother on the importance of treating her temps as well. Patient had loose stools continued throughout shift. Cleaned and new linen changed as needed. Patient also has reddened sacrum from frequent bowel movements. Cleaned with soap and water and zinc placed on sacrum. Patient also turned and repositioned q2 and prn. Report given to [facility].  Report consisted of patients Situation, Background, Assessment and   Recommendations(SBAR)

## 2021-03-04 NOTE — CONSULTS
[address]    Name:  Erick Avilez  MR#:  [id number]  :  1990  ACCOUNT #:  [de-identified]  DATE OF SERVICE:  2021      HISTORY OF PRESENT ILLNESS:  I was asked by Dr. Mary Russell to evaluate this 76-year-old female with a diagnosis of native valve bacterial endocarditis. Her current mental status does not allow an accurate history; however, from the chart reviewed and discussion of the case with Dr. Elizabeth Pleitez, she is currently an active IV drug abuser who presented with a three-day history of pain and was admitted to [facility] and was noted to be febrile. She was treated on  and started on Zosyn and vancomycin and presented to the emergency room here unable to walk with an altered mental status. Her subsequent clinical course, she has had no repeat blood cultures; however, the blood cultures obtained at [facility] on  were positive for Staph bacteremia. A review of her echocardiogram yesterday shows the patient has normal biventricular function. There is evidence of a moderate-sized less than 1 cm vegetation on the base of her posterior leaflet. The amount of mitral regurgitation is difficult to quantitate, but I believe it is at least moderate. REVIEW OF SYSTEMS:  Unobtainable. PAST MEDICAL HISTORY:  1. Active IV drug abuse. 2.  Anxiety disorder. 3.  Hepatitis C.  4.  Episode of psychosis according to the chart. FAMILY HISTORY:  Unknown. PHYSICAL EXAMINATION:  GENERAL:  She is requesting water but unable to respond in terms of person and place. VITAL SIGNS:  Her temperature is 101, her pulse is 100 and regular, her blood pressure is 130/95. HEART:  Her heart is in a regular rapid rhythm. There is perhaps a systolic ejection murmur, but this is not readily discernible. LUNGS:  Her lung fields are clear.   HEENT:  Her dentition is quite poor, but she will not let me open her mouth adequately to examine it. NECK:  Soft and nontender. ABDOMEN:  Soft and nontender. EXTREMITIES:  There is no peripheral edema. NEUROLOGIC:  Her eyes are closed. She is asking for water. She is not oriented to place and cannot coherently answer any questions. IMPRESSION:  1. 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 2.  History of intravenous drug abuse and psychosis. PLAN:  Continue IV antibiotics, repeat blood cultures, and follow.       Wilson Gregorio MD      [id number]  D:  03/03/2021 15:33  T:  03/03/2021 19:05  JOB #:  [id number]

## 2021-03-04 NOTE — PROGRESS NOTES
[address] [facility] Adult  Hospitalist Group                                                                                          Hospitalist Progress Note  Marianna Jordan MD  Answering service: [phone] from in house phone        Date of Service:  3/4/2021  NAME:  Alix Salgado  :  1990  MRN:  [id number]      Please note that this dictation was completed with [employer], the computer voice recognition software. Quite often unanticipated grammatical, syntax, homophones, and other interpretive errors are inadvertently transcribed by the computer software. Please disregard these errors. Please excuse any errors that have escaped final proofreading. Admission Summary:   Paul Teixeira is a 31 y.o.  female who presents with above symptoms. Started suddenly. Progressively worse. Severe now. Patient with altered mental status not willing and unable to provide information. Mother reports she is adult but lives at her house. Not sure when she used any type of IV drugs last time. Possibly yesterday. Reports she has been dealing with IV drug abuse for many years. Apparently after an . Mother reports she has been multiple times in, due to narcotic overdose, reversed by nasal Narcan. She also has history of amphetamine use, unknown last use. Last 4 days unable to ambulate. She has been suffering from severe myalgia and fever occasional cough, negative for Covid no exposure. Mother reports she was negative as well. Patient and mother report, never had similar symptoms in the past.  She takes Suboxone and Xanax as needed for anxiety. Not have any other prescriptions.       Interval history / Subjective:   Patient seen and examined today. Labs imaging and chart reviewed. Mental status is much better today. Mood adjustment made. She still is having diarrhea but the C. difficile negative. She can start eating. Now has a central line.   Antibiotics according to ID  Cardiothoracic surgery on board     Assessment & Plan:     Sepsis due to staph aureus bacteremia POA   With evidence of staph aureus bacteremia 4 x 4, and also the presence of leukocytosis with elevated CRP and tachycardia  Troponins elevated as well but flat  IV drug abuser, per mother shot heroin few days back  Started on vancomycin and Zosyn, changed to cefepime and Flagyl by ID  Now changed to cefazolin  Repeat blood cultures   CT chest shows a may be possibility of some aspiration  In the bronchi, but no evident pneumonia  Respiratory PCR negative including SARS  Echocardiogram shows a posterior mitral valve vegetation and severe MR. CT surgery consulted, they will continue to follow  MRI of the brain ordered    Diarrhea. C. difficile negative  CT abdomen ordered    Hypernatremia and hypokalemia  Due to free water loss from a diarrhea. Change IV fluids to dextrose   Hyponatremia improving slowly      Altered mental status, improving  Encephalopathy from sepsis and likely withdrawal from drugs , heroin withdrawal and benzo withdrawal   But cannot rule out any septic emboli in the brain, will order MRI of the brain  CT of the head without any acute abnormality. IV Ativan 2 mg and 4 mg as needed        Possibility of her benzo withdrawal and heroin withdrawal and/or amphetamine overdose  Started patient back on Xanax and her Suboxone  Symptomatic treatment needed  IV Ativan as needed. See above  Improved    Hypokalemia  IV fluids with potassium  Resolved        Thrombocytopenia  Restart Lovenox prophylactic    Body mass index is 18.4 kg/m². History of hepatitis C.       Code status: FULL  DVT prophylaxis: scd     Care Plan discussed with: Patient/Family  Anticipated Disposition: Home w/Family  Anticipated Discharge: Greater than 48 hours     Hospital Problems  Never Reviewed          Codes Class Noted POA    Sepsis ([facility] [address] ICD-10-CM: A41.9  ICD-9-CM: 038.9, 995.91  2/28/2021 Unknown                Review of Systems:   A comprehensive review of systems was negative except for that written in the HPI.       Vital Signs:    Last 24hrs VS reviewed since prior progress note. Most recent are:  Visit Vitals  /82   Pulse (!) 106   Temp 99.9 °F (37.7 °C)   Resp 26   Ht 5' 6\" (1.676 m)   Wt 51.7 kg (114 lb)   SpO2 97%   BMI 18.40 kg/m²         Intake/Output Summary (Last 24 hours) at 3/4/2021 1602  Last data filed at 3/4/2021 1434  Gross per 24 hour   Intake 3736.67 ml   Output 3650 ml   Net 86.67 ml        Physical Examination:     I had a face to face encounter with this patient and independently examined them on 03/04/21 as outlined below:        Constitutional:  Intermittently awake   ENT:  Oral mucosa moist, oropharynx benign.    Resp:  CTA bilaterally. No wheezing/rhonchi/rales. No accessory muscle use   CV:  Regular rhythm, normal rate, no murmurs, gallops, rubs    GI:  Soft, non distended, non tender. normoactive bowel sounds, no hepatosplenomegaly     Musculoskeletal:  Moving around in the bed    Neurologic:  Alert today not agitated            Data Review:    Review and/or order of clinical lab test      Labs:     Recent Labs     03/04/21  0351 03/02/21  2356   WBC 16.9* 19.4*   HGB 9.1* 10.1*   HCT 26.4* 29.4*   * 109*     Recent Labs     03/04/21  1426 03/04/21  0351 03/02/21  2356    142 150*   K 3.0* 2.4* 3.8   * 111* 124*   CO2 26 23 18*   BUN 9 15 28*   CREA 0.52* 0.52* 0.53*   * 134* 119*   CA 7.5* 8.1* 9.1   MG  --  1.8  --    PHOS  --  2.9  --      Recent Labs     03/02/21  2356 03/02/21  0613   ALT 27 30   * 183*   TBILI 1.4* 1.3*   TP 6.6 6.3*   ALB 2.2* 2.2*   GLOB 4.4* 4.1*     No results for input(s): INR, PTP, APTT, INREXT, INREXT in the last 72 hours.   No results for input(s): FE, TIBC, PSAT, FERR in the last 72 hours.   No results found for: FOL, RBCF   No results for input(s): PH, PCO2, PO2 in the last 72 hours.  No results for input(s): CPK, CKNDX, TROIQ in the  last 72 hours.     No lab exists for component: CPKMB  No results found for: CHOL, CHOLX, CHLST, CHOLV, HDL, HDLP, LDL, LDLC, DLDLP, TGLX, TRIGL, TRIGP, CHHD, CHHDX  Lab Results   Component Value Date/Time    Glucose (POC) 132 (H) 03/03/2021 05:27 AM    Glucose (POC) 118 (H) 03/03/2021 12:18 AM     Lab Results   Component Value Date/Time    Color DARK YELLOW 02/28/2021 06:12 PM    Appearance CLEAR 02/28/2021 06:12 PM    Specific gravity 1.020 02/28/2021 06:12 PM    Specific gravity 1.016 02/28/2021 06:00 AM    pH (UA) 6.5 02/28/2021 06:12 PM    Protein 100 (A) 02/28/2021 06:12 PM    Glucose Negative 02/28/2021 06:12 PM    Ketone 15 (A) 02/28/2021 06:12 PM    Bilirubin Negative 02/28/2021 06:00 AM    Urobilinogen 2.0 (H) 02/28/2021 06:12 PM    Nitrites Positive (A) 02/28/2021 06:12 PM    Leukocyte Esterase MODERATE (A) 02/28/2021 06:12 PM    Epithelial cells FEW 02/28/2021 06:12 PM    Bacteria 1+ (A) 02/28/2021 06:12 PM    WBC 20-50 02/28/2021 06:12 PM    RBC >100 (H) 02/28/2021 06:12 PM         Medications Reviewed:     Current Facility-Administered Medications   Medication Dose Route Frequency    dextrose 5% - 0.45% NaCl with KCl 40 mEq/L infusion   IntraVENous CONTINUOUS    ceFAZolin (ANCEF) 2 g in sterile water (preservative free) 20 mL IV syringe  2 g IntraVENous Q8H    melatonin tablet 3 mg  3 mg Oral QHS PRN    nicotine (NICODERM CQ) 21 mg/24 hr patch 1 Patch  1 Patch TransDERmal DAILY    LORazepam (ATIVAN) injection 4 mg  4 mg IntraVENous Q4H PRN    LORazepam (ATIVAN) injection 2 mg  2 mg IntraVENous Q2H PRN    influenza vaccine 2020-21 (6 mos+)(PF) (FLUARIX/FLULAVAL/FLUZONE QUAD) injection 0.5 mL  0.5 mL IntraMUSCular PRIOR TO DISCHARGE    buprenorphine-naloxone (SUBOXONE) 8-2mg SL tablet  1 Tab SubLINGual BID    ketorolac (TORADOL) injection 15 mg  15 mg IntraVENous Q6H PRN    sodium chloride (NS) flush 5-40 mL  5-40 mL IntraVENous Q8H    sodium chloride (NS) flush 5-40 mL  5-40 mL IntraVENous PRN  acetaminophen (TYLENOL) tablet 650 mg  650 mg Oral Q6H PRN    Or    acetaminophen (TYLENOL) suppository 650 mg  650 mg Rectal Q6H PRN    polyethylene glycol (MIRALAX) packet 17 g  17 g Oral DAILY PRN    promethazine (PHENERGAN) tablet 12.5 mg  12.5 mg Oral Q6H PRN    Or    ondansetron (ZOFRAN) injection 4 mg  4 mg IntraVENous Q6H PRN    enoxaparin (LOVENOX) injection 40 mg  40 mg SubCUTAneous DAILY     ______________________________________________________________________  EXPECTED LENGTH OF STAY: 4d 19h  ACTUAL LENGTH OF STAY:          4                 Gaby Beckham MD

## 2021-03-04 NOTE — PROGRESS NOTES
Received a critical result from lab. Patient's Potassium 2.4 this morning. Information relayed to primary Nurse, Yordan Edmond and her orientee.

## 2021-03-04 NOTE — PROGRESS NOTES
[facility] FLOOR Progress Note    Admit Date: 2021    MV Endocarditis    Subjective:   Pt seen. On/off agitation overnight. Tmax 100F. ST, tachypnic. Blood cultures collected this morning. Objective:     Visit Vitals  /76   Pulse (!) 109   Temp 100 °F (37.8 °C)   Resp (!) 37   Ht 5' 6\" (1.676 m)   Wt 114 lb (51.7 kg)   SpO2 97%   BMI 18.40 kg/m²     Temp (24hrs), Av.7 °F (37.6 °C), Min:98.8 °F (37.1 °C), Max:100 °F (37.8 °C)      Last 24hr Input/Output:    Intake/Output Summary (Last 24 hours) at 3/4/2021 0735  Last data filed at 3/4/2021 0639  Gross per 24 hour   Intake 3736.67 ml   Output 2450 ml   Net 1286.67 ml        EKG/Rhythm: Sinus tachycardia    Oxygen: On RA    CXR:   CXR Results  (Last 48 hours)               21 1725  XR CHEST PORT Final result    Impression:  1. A right IJ approach catheter projects to terminate in the distal SVC. 2. Interstitial prominence, nonspecific which may represent pulmonary vascular   congestion. Narrative:  INDICATION: . post line placement   Additional history:   COMPARISON: CT of the chest, 2021. LIMITATIONS: Portable technique. Piero Colvin FINDINGS: Single frontal view of the chest.    .   Lines/tubes/surgical: A right IJ approach catheter projects to terminate in the   distal SVC. Heart/mediastinum: Unremarkable. Lungs/pleura: Interstitial prominence. No visible pneumothorax. Additional Comments: None. .             Admission Weight: Last Weight   Weight: 130 lb (59 kg) Weight: 114 lb (51.7 kg)       EXAM:  General: Opens eyes   Lungs:   Clear to auscultation bilaterally. Heart:  Regular rate and rhythm, S1, S2 normal, no murmur, click, rub or gallop. Abdomen:   Soft, non-tender. Bowel sounds normal. No masses,  No organomegaly. Extremities:  No edema.  PPP   Neurologic:  Moves all extremities     Lab Data Reviewed:   Recent Labs     21  0351 21  0527   WBC 16.9*  --    HGB 9.1*  --    HCT 26.4*  --    PLT 117*  --      --    K 2.4*  --    BUN 15  --    CREA 0.52*  --    *  --    GLUCPOC  --  132*         Assessment:     Active Problems:    Sepsis ([address] (2021)             Plan/Recommendations/Medical Decision Makin. Possible MV endocarditis with severe MR/Sepsis: TTE reviewed, suboptimal image quality. BC  positive MSSA. On cefazolin, ID following. Will monitor repeat blood cultures. Will continue to follow. 2. Hypokalemia: Replete per primary team  3. Hx IVDU  4. Possible hx Hepatitis C  5. Thrombocytopenia  6. Normocytic anemia  7. Elevated T.Bili    Dispo: Cardiac surgery will continue to follow [facility], will likely repeat TTE/possible KELLY pending results.  Rest of care/orders per primary team.     Signed By: CHARLENE Watson    Concerning low grade temp on antibiotics  Repeat blood cultures pending drawn today  Continue antibiotics   Will need mri at some point

## 2021-03-05 LAB
ANION GAP SERPL CALC-SCNC: 6 MMOL/L (ref 5–15)
BASOPHILS # BLD: 0 K/UL (ref 0–0.1)
BASOPHILS NFR BLD: 0 % (ref 0–1)
BUN SERPL-MCNC: 7 MG/DL (ref 6–20)
BUN/CREAT SERPL: 13 (ref 12–20)
CALCIUM SERPL-MCNC: 7.3 MG/DL (ref 8.5–10.1)
CHLORIDE SERPL-SCNC: 114 MMOL/L (ref 97–108)
CO2 SERPL-SCNC: 24 MMOL/L (ref 21–32)
COMMENT, HOLDF: NORMAL
CREAT SERPL-MCNC: 0.52 MG/DL (ref 0.55–1.02)
DIFFERENTIAL METHOD BLD: ABNORMAL
EOSINOPHIL # BLD: 0.1 K/UL (ref 0–0.4)
EOSINOPHIL NFR BLD: 1 % (ref 0–7)
ERYTHROCYTE [DISTWIDTH] IN BLOOD BY AUTOMATED COUNT: 13.9 % (ref 11.5–14.5)
GLUCOSE SERPL-MCNC: 115 MG/DL (ref 65–100)
HCT VFR BLD AUTO: 21 % (ref 35–47)
HGB BLD-MCNC: 7.2 G/DL (ref 11.5–16)
IMM GRANULOCYTES # BLD AUTO: 0.2 K/UL (ref 0–0.04)
IMM GRANULOCYTES NFR BLD AUTO: 2 % (ref 0–0.5)
LYMPHOCYTES # BLD: 2 K/UL (ref 0.8–3.5)
LYMPHOCYTES NFR BLD: 16 % (ref 12–49)
MCH RBC QN AUTO: 29.5 PG (ref 26–34)
MCHC RBC AUTO-ENTMCNC: 34.3 G/DL (ref 30–36.5)
MCV RBC AUTO: 86.1 FL (ref 80–99)
MONOCYTES # BLD: 0.7 K/UL (ref 0–1)
MONOCYTES NFR BLD: 5 % (ref 5–13)
NEUTS SEG # BLD: 9.2 K/UL (ref 1.8–8)
NEUTS SEG NFR BLD: 76 % (ref 32–75)
NRBC # BLD: 0 K/UL (ref 0–0.01)
NRBC BLD-RTO: 0 PER 100 WBC
PLATELET # BLD AUTO: 162 K/UL (ref 150–400)
PMV BLD AUTO: 10.9 FL (ref 8.9–12.9)
POTASSIUM SERPL-SCNC: 3.5 MMOL/L (ref 3.5–5.1)
RBC # BLD AUTO: 2.44 M/UL (ref 3.8–5.2)
SAMPLES BEING HELD,HOLD: NORMAL
SODIUM SERPL-SCNC: 144 MMOL/L (ref 136–145)
WBC # BLD AUTO: 12.2 K/UL (ref 3.6–11)

## 2021-03-05 PROCEDURE — 74011250636 HC RX REV CODE- 250/636: Performed by: INTERNAL MEDICINE

## 2021-03-05 PROCEDURE — 99232 SBSQ HOSP IP/OBS MODERATE 35: CPT | Performed by: INTERNAL MEDICINE

## 2021-03-05 PROCEDURE — 80048 BASIC METABOLIC PNL TOTAL CA: CPT

## 2021-03-05 PROCEDURE — 87522 HEPATITIS C REVRS TRNSCRPJ: CPT

## 2021-03-05 PROCEDURE — 87147 CULTURE TYPE IMMUNOLOGIC: CPT

## 2021-03-05 PROCEDURE — 74011250637 HC RX REV CODE- 250/637: Performed by: INTERNAL MEDICINE

## 2021-03-05 PROCEDURE — 36415 COLL VENOUS BLD VENIPUNCTURE: CPT

## 2021-03-05 PROCEDURE — 87040 BLOOD CULTURE FOR BACTERIA: CPT

## 2021-03-05 PROCEDURE — 74011000250 HC RX REV CODE- 250: Performed by: INTERNAL MEDICINE

## 2021-03-05 PROCEDURE — 74011250636 HC RX REV CODE- 250/636: Performed by: NURSE PRACTITIONER

## 2021-03-05 PROCEDURE — 65660000001 HC RM ICU INTERMED STEPDOWN

## 2021-03-05 PROCEDURE — 74011250636 HC RX REV CODE- 250/636: Performed by: FAMILY MEDICINE

## 2021-03-05 PROCEDURE — 74011250637 HC RX REV CODE- 250/637: Performed by: NURSE PRACTITIONER

## 2021-03-05 PROCEDURE — 85025 COMPLETE CBC W/AUTO DIFF WBC: CPT

## 2021-03-05 PROCEDURE — 99231 SBSQ HOSP IP/OBS SF/LOW 25: CPT | Performed by: THORACIC SURGERY (CARDIOTHORACIC VASCULAR SURGERY)

## 2021-03-05 RX ADMIN — LORAZEPAM 2 MG: 2 INJECTION INTRAMUSCULAR; INTRAVENOUS at 15:32

## 2021-03-05 RX ADMIN — BUPRENORPHINE HYDROCHLORIDE AND NALOXONE HYDROCHLORIDE DIHYDRATE 1 TABLET: 8; 2 TABLET SUBLINGUAL at 09:38

## 2021-03-05 RX ADMIN — Medication 10 ML: at 05:10

## 2021-03-05 RX ADMIN — WATER 2 G: 1 INJECTION INTRAMUSCULAR; INTRAVENOUS; SUBCUTANEOUS at 13:36

## 2021-03-05 RX ADMIN — WATER 2 G: 1 INJECTION INTRAMUSCULAR; INTRAVENOUS; SUBCUTANEOUS at 05:09

## 2021-03-05 RX ADMIN — Medication 10 ML: at 13:27

## 2021-03-05 RX ADMIN — WATER 2 G: 1 INJECTION INTRAMUSCULAR; INTRAVENOUS; SUBCUTANEOUS at 21:14

## 2021-03-05 RX ADMIN — Medication 3 MG: at 22:51

## 2021-03-05 RX ADMIN — POTASSIUM CHLORIDE, DEXTROSE MONOHYDRATE AND SODIUM CHLORIDE: 300; 5; 450 INJECTION, SOLUTION INTRAVENOUS at 22:52

## 2021-03-05 RX ADMIN — BUPRENORPHINE HYDROCHLORIDE AND NALOXONE HYDROCHLORIDE DIHYDRATE 1 TABLET: 8; 2 TABLET SUBLINGUAL at 19:21

## 2021-03-05 RX ADMIN — ENOXAPARIN SODIUM 40 MG: 40 INJECTION SUBCUTANEOUS at 09:38

## 2021-03-05 RX ADMIN — KETOROLAC TROMETHAMINE 15 MG: 30 INJECTION, SOLUTION INTRAMUSCULAR at 13:25

## 2021-03-05 RX ADMIN — LORAZEPAM 2 MG: 2 INJECTION INTRAMUSCULAR; INTRAVENOUS at 13:27

## 2021-03-05 RX ADMIN — MULTIPLE VITAMINS W/ MINERALS TAB 1 TABLET: TAB at 09:38

## 2021-03-05 RX ADMIN — Medication 10 ML: at 21:14

## 2021-03-05 RX ADMIN — KETOROLAC TROMETHAMINE 15 MG: 30 INJECTION, SOLUTION INTRAMUSCULAR at 21:21

## 2021-03-05 RX ADMIN — POTASSIUM CHLORIDE, DEXTROSE MONOHYDRATE AND SODIUM CHLORIDE: 300; 5; 450 INJECTION, SOLUTION INTRAVENOUS at 01:14

## 2021-03-05 RX ADMIN — KETOROLAC TROMETHAMINE 15 MG: 30 INJECTION, SOLUTION INTRAMUSCULAR at 05:04

## 2021-03-05 NOTE — PROGRESS NOTES
[facility] FLOOR Progress Note    Admit Date: 2021    Possible MV Endocarditis    Subjective:   Blood cultures pending. Tmax 102.2F. More interactive today. Objective:     Visit Vitals  BP (!) 115/57 (BP 1 Location: Left arm, BP Patient Position: At rest)   Pulse (!) 109   Temp 99.4 °F (37.4 °C)   Resp 27   Ht 5' 6\" (1.676 m)   Wt 125 lb 11.2 oz (57 kg)   SpO2 94%   BMI 20.29 kg/m²     Temp (24hrs), Av.7 °F (37.6 °C), Min:98.3 °F (36.8 °C), Max:102.2 °F (39 °C)      Last 24hr Input/Output:    Intake/Output Summary (Last 24 hours) at 3/5/2021 0732  Last data filed at 3/5/2021 0527  Gross per 24 hour   Intake 2360 ml   Output 3450 ml   Net -1090 ml        EKG/Rhythm: Sinus tachycardia    Oxygen: On RA    CXR:   CXR Results  (Last 48 hours)               21 1725  XR CHEST PORT Final result    Impression:  1. A right IJ approach catheter projects to terminate in the distal SVC. 2. Interstitial prominence, nonspecific which may represent pulmonary vascular   congestion. Narrative:  INDICATION: . post line placement   Additional history:   COMPARISON: CT of the chest, 2021. LIMITATIONS: Portable technique. Sharlene Money FINDINGS: Single frontal view of the chest.    .   Lines/tubes/surgical: A right IJ approach catheter projects to terminate in the   distal SVC. Heart/mediastinum: Unremarkable. Lungs/pleura: Interstitial prominence. No visible pneumothorax. Additional Comments: None. .             Admission Weight: Last Weight   Weight: 130 lb (59 kg) Weight: 125 lb 11.2 oz (57 kg)       EXAM:  General: Pleasant, cooperative   Lungs:   Clear to auscultation bilaterally. Heart:  Fast rate, regular rhythm, S1, S2 normal, no murmur, click, rub or gallop. Abdomen:   Soft, non-tender. Bowel sounds normal. No masses,  No organomegaly. Extremities:  No edema.  PPP   Neurologic:  Moves all extremities     Lab Data Reviewed:   Recent Labs     2113 21  6800 21  0527   WBC 12.2*   < >  --    HGB 7.2*   < >  --    HCT 21.0*   < >  --       < >  --       < >  --    K 3.5   < >  --    BUN 7   < >  --    CREA 0.52*   < >  --    *   < >  --    GLUCPOC  --   --  132*    < > = values in this interval not displayed. Assessment:     Active Problems:    Sepsis ([address] (2021)         Plan/Recommendations/Medical Decision Makin. Possible MV endocarditis with severe MR/Sepsis: TTE reviewed, suboptimal image quality. BC  positive MSSA. [facility]  growing G+ cocci. On cefazolin, ID following. Will continue to follow. 2. Hypokalemia: Replete per primary team  3. Hx IVDU: On suboxone  4. Possible hx Hepatitis C  5. Thrombocytopenia  6. Normocytic anemia  7. Elevated T.Bili    Dispo: Cardiac surgery will continue to follow [facility], will likely repeat TTE/possible KELLY pending results.  Rest of care/orders per primary team.     Signed By: CHARLENE Orlando     Mental status improving   Awaiting blood culture results   Continues to have fever which is concerning   Will continue to follow

## 2021-03-05 NOTE — PROGRESS NOTES
[address] [facility] Adult  Hospitalist Group                                                                                          Hospitalist Progress Note  Sydni Valle MD  Answering service: [phone] OR [phone] from in house phone        Date of Service:  3/5/2021  NAME:  Jovon Dorsey  :  1990  MRN:  [id number]      Please note that this dictation was completed with [employer], the computer voice recognition software. Quite often unanticipated grammatical, syntax, homophones, and other interpretive errors are inadvertently transcribed by the computer software. Please disregard these errors. Please excuse any errors that have escaped final proofreading. Admission Summary:   Adali Ortega is a 31 y.o.  female who presents with above symptoms. Started suddenly. Progressively worse. Severe now. Patient with altered mental status not willing and unable to provide information. Mother reports she is adult but lives at her house. Not sure when she used any type of IV drugs last time. Possibly yesterday. Reports she has been dealing with IV drug abuse for many years. Apparently after an . Mother reports she has been multiple times in, due to narcotic overdose, reversed by nasal Narcan. She also has history of amphetamine use, unknown last use. Last 4 days unable to ambulate. She has been suffering from severe myalgia and fever occasional cough, negative for Covid no exposure. Mother reports she was negative as well. Patient and mother report, never had similar symptoms in the past.  She takes Suboxone and Xanax as needed for anxiety. Not have any other prescriptions.       Interval history / Subjective:   Patient seen and examined today. Labs imaging and chart reviewed. Mental status is much better today.     She has a visitor in the room   She is asking for pain medications and saying pain is all over the body        Assessment & Plan:     Severe Sepsis due to staph aureus bacteremia POA   With evidence of staph aureus bacteremia 4 x 4, and also the presence of leukocytosis with elevated CRP and tachycardia  Troponins elevated as well but flat  IV drug abuser, per mother shot heroin few days back  Started on vancomycin and Zosyn, changed to cefepime and Flagyl by ID  Now changed to cefazolin  Repeat blood cultures   CT chest shows a may be possibility of some aspiration  In the bronchi, but no evident pneumonia  Respiratory PCR negative including SARS  Echocardiogram shows a posterior mitral valve vegetation and severe MR. CT surgery consulted, they will continue to follow  MRI of the brain ordered    Diarrhea. C. difficile negative  CT abdomen ordered    Hypernatremia and hypokalemia  Due to free water loss from a diarrhea. Change IV fluids to dextrose   Hyponatremia improving slowly      Altered mental status, improving  Encephalopathy from sepsis and likely withdrawal from drugs , heroin withdrawal and benzo withdrawal   CT of the head without any acute abnormality. MRI with Multiple punctate foci restricted diffusion and some associated T2  hyperintensity gray-white junction greater in the right hemisphere suggesting  Emboli. Will consult neurology         Possibility of her benzo withdrawal and heroin withdrawal and/or amphetamine overdose  Started patient back on Xanax and her Suboxone  Symptomatic treatment needed  IV Ativan as needed. See above  Improved    Hypokalemia  IV fluids with potassium  Resolved        Thrombocytopenia  Restart Lovenox prophylactic    Body mass index is 20.29 kg/m². History of hepatitis C.       Code status: FULL  DVT prophylaxis: scd     Care Plan discussed with: Patient/Family  Anticipated Disposition: Home w/Family  Anticipated Discharge: Greater than 48 hours     Hospital Problems  Never Reviewed          Codes Class Noted POA    Sepsis ([facility] [address] ICD-10-CM: A41.9  ICD-9-CM: 038.9, 995.91  2/28/2021 Unknown                Review of Systems:   A comprehensive review of systems was negative except for that written in the HPI.       Vital Signs:    Last 24hrs VS reviewed since prior progress note. Most recent are:  Visit Vitals  /78 (BP 1 Location: Left upper arm, BP Patient Position: At rest)   Pulse (!) 122   Temp (!) 100.8 °F (38.2 °C)   Resp 14   Ht 5' 6\" (1.676 m)   Wt 57 kg (125 lb 11.2 oz)   SpO2 97%   BMI 20.29 kg/m²         Intake/Output Summary (Last 24 hours) at 3/5/2021 1551  Last data filed at 3/5/2021 1318  Gross per 24 hour   Intake 2360 ml   Output 4250 ml   Net -1890 ml        Physical Examination:     I had a face to face encounter with this patient and independently examined them on 03/05/21 as outlined below:        Constitutional:  awake and talking today    ENT:  Oral mucosa moist, oropharynx benign.    Resp:  CTA bilaterally. No wheezing/rhonchi/rales. No accessory muscle use   CV:  Regular rhythm, normal rate, no murmurs, gallops, rubs    GI:  Soft, non distended, non tender. normoactive bowel sounds, no hepatosplenomegaly     Musculoskeletal:  Moving around in the bed    Neurologic: Alert and oriented             Data Review:    Review and/or order of clinical lab test      Labs:     Recent Labs     03/05/21  0513 03/04/21  0351   WBC 12.2* 16.9*   HGB 7.2* 9.1*   HCT 21.0* 26.4*    117*     Recent Labs     03/05/21  0513 03/04/21  1426 03/04/21  0351    141 142   K 3.5 3.0* 2.4*   * 109* 111*   CO2 24 26 23   BUN 7 9 15   CREA 0.52* 0.52* 0.52*   * 101* 134*   CA 7.3* 7.5* 8.1*   MG  --   --  1.8   PHOS  --   --  2.9     Recent Labs     03/02/21  2356   ALT 27   *   TBILI 1.4*   TP 6.6   ALB 2.2*   GLOB 4.4*     No results for input(s): INR, PTP, APTT, INREXT, INREXT in the last 72 hours.   No results for input(s): FE, TIBC, PSAT, FERR in the last 72 hours.   No results found for: FOL, RBCF   No results for input(s): PH, PCO2, PO2 in the last 72 hours.  No results for input(s): CPK,  CKNDX, TROIQ in the last 72 hours.     No lab exists for component: CPKMB  No results found for: CHOL, CHOLX, CHLST, CHOLV, HDL, HDLP, LDL, LDLC, DLDLP, TGLX, TRIGL, TRIGP, CHHD, CHHDX  Lab Results   Component Value Date/Time    Glucose (POC) 132 (H) 03/03/2021 05:27 AM    Glucose (POC) 118 (H) 03/03/2021 12:18 AM     Lab Results   Component Value Date/Time    Color DARK YELLOW 02/28/2021 06:12 PM    Appearance CLEAR 02/28/2021 06:12 PM    Specific gravity 1.020 02/28/2021 06:12 PM    Specific gravity 1.016 02/28/2021 06:00 AM    pH (UA) 6.5 02/28/2021 06:12 PM    Protein 100 (A) 02/28/2021 06:12 PM    Glucose Negative 02/28/2021 06:12 PM    Ketone 15 (A) 02/28/2021 06:12 PM    Bilirubin Negative 02/28/2021 06:00 AM    Urobilinogen 2.0 (H) 02/28/2021 06:12 PM    Nitrites Positive (A) 02/28/2021 06:12 PM    Leukocyte Esterase MODERATE (A) 02/28/2021 06:12 PM    Epithelial cells FEW 02/28/2021 06:12 PM    Bacteria 1+ (A) 02/28/2021 06:12 PM    WBC 20-50 02/28/2021 06:12 PM    RBC >100 (H) 02/28/2021 06:12 PM         Medications Reviewed:     Current Facility-Administered Medications   Medication Dose Route Frequency    dextrose 5% - 0.45% NaCl with KCl 40 mEq/L infusion   IntraVENous CONTINUOUS    multivitamin, tx-iron-ca-min (THERA-M w/ IRON) tablet 1 Tab  1 Tab Oral DAILY    ceFAZolin (ANCEF) 2 g in sterile water (preservative free) 20 mL IV syringe  2 g IntraVENous Q8H    melatonin tablet 3 mg  3 mg Oral QHS PRN    nicotine (NICODERM CQ) 21 mg/24 hr patch 1 Patch  1 Patch TransDERmal DAILY    LORazepam (ATIVAN) injection 4 mg  4 mg IntraVENous Q4H PRN    LORazepam (ATIVAN) injection 2 mg  2 mg IntraVENous Q2H PRN    influenza vaccine 2020-21 (6 mos+)(PF) (FLUARIX/FLULAVAL/FLUZONE QUAD) injection 0.5 mL  0.5 mL IntraMUSCular PRIOR TO DISCHARGE    buprenorphine-naloxone (SUBOXONE) 8-2mg SL tablet  1 Tab SubLINGual BID    ketorolac (TORADOL) injection 15 mg  15 mg IntraVENous Q6H PRN    sodium chloride (NS) flush 5-40 mL  5-40 mL IntraVENous Q8H    sodium chloride (NS) flush 5-40 mL  5-40 mL IntraVENous PRN    acetaminophen (TYLENOL) tablet 650 mg  650 mg Oral Q6H PRN    Or    acetaminophen (TYLENOL) suppository 650 mg  650 mg Rectal Q6H PRN    polyethylene glycol (MIRALAX) packet 17 g  17 g Oral DAILY PRN    promethazine (PHENERGAN) tablet 12.5 mg  12.5 mg Oral Q6H PRN    Or    ondansetron (ZOFRAN) injection 4 mg  4 mg IntraVENous Q6H PRN    enoxaparin (LOVENOX) injection 40 mg  40 mg SubCUTAneous DAILY     ______________________________________________________________________  EXPECTED LENGTH OF STAY: 4d 19h  ACTUAL LENGTH OF STAY:          Elvan Schwab, MD

## 2021-03-05 NOTE — PROGRESS NOTES
Infectious Disease Progress Note          HPI  Ms Sonja Valderrama seen  Says she feels better  Denied chest pain, shortness of breath, nausea, vomiting  Denied neuro symptoms of headache, focal weakness             Physical Exam:    Vitals:   Patient Vitals for the past 24 hrs:   Temp Pulse Resp BP SpO2   03/05/21 0935 99.7 °F (37.6 °C) (!) 118 22 120/69 98 %   03/05/21 0515 99.4 °F (37.4 °C) (!) 109 27 (!) 115/57 94 %   03/05/21 0114     97 %   03/04/21 2315 98.3 °F (36.8 °C) (!) 122 18 103/60 97 %   03/04/21 2202 98.6 °F (37 °C) (!) 123 26 104/67 95 %   03/04/21 2017 (!) 102.2 °F (39 °C) (!) 113 13 115/78 97 %   03/04/21 1335 99.9 °F (37.7 °C) (!) 106 26 124/82 97 %     · GEN NAD  · HEENT: no scleral icterus, no thrush , poor dentition+ RIJ  · CV: S1, S2 heard ,  · Lungs:  Coarse anteriorly   · Abdomen: soft, non distended, non tender   · Extremities: no edema noted   · Neuro:awake, alert, to self place, situation, no tremors, follows commands, move all extremities   · Skin: no rash  Seen   ·   Labs:   Recent Results (from the past 24 hour(s))   METABOLIC PANEL, BASIC    Collection Time: 03/04/21  2:26 PM   Result Value Ref Range    Sodium 141 136 - 145 mmol/L    Potassium 3.0 (L) 3.5 - 5.1 mmol/L    Chloride 109 (H) 97 - 108 mmol/L    CO2 26 21 - 32 mmol/L    Anion gap 6 5 - 15 mmol/L    Glucose 101 (H) 65 - 100 mg/dL    BUN 9 6 - 20 MG/DL    Creatinine 0.52 (L) 0.55 - 1.02 MG/DL    BUN/Creatinine ratio 17 12 - 20      GFR est AA >60 >60 ml/min/1.73m2    GFR est non-AA >60 >60 ml/min/1.73m2    Calcium 7.5 (L) 8.5 - 10.1 MG/DL   CBC WITH AUTOMATED DIFF    Collection Time: 03/05/21  5:13 AM   Result Value Ref Range    WBC 12.2 (H) 3.6 - 11.0 K/uL    RBC 2.44 (L) 3.80 - 5.20 M/uL    HGB 7.2 (L) 11.5 - 16.0 g/dL    HCT 21.0 (L) 35.0 - 47.0 %    MCV 86.1 80.0 - 99.0 FL    MCH 29.5 26.0 - 34.0 PG    MCHC 34.3 30.0 - 36.5 g/dL    RDW 13.9 11.5 - 14.5 %    PLATELET 624 235 - 881 K/uL    MPV 10.9 8.9 - 12.9 FL    NRBC 0.0 0 PER 100 WBC    ABSOLUTE NRBC 0.00 0.00 - 0.01 K/uL    NEUTROPHILS 76 (H) 32 - 75 %    LYMPHOCYTES 16 12 - 49 %    MONOCYTES 5 5 - 13 %    EOSINOPHILS 1 0 - 7 %    BASOPHILS 0 0 - 1 %    IMMATURE GRANULOCYTES 2 (H) 0.0 - 0.5 %    ABS. NEUTROPHILS 9.2 (H) 1.8 - 8.0 K/UL    ABS. LYMPHOCYTES 2.0 0.8 - 3.5 K/UL    ABS. MONOCYTES 0.7 0.0 - 1.0 K/UL    ABS. EOSINOPHILS 0.1 0.0 - 0.4 K/UL    ABS. BASOPHILS 0.0 0.0 - 0.1 K/UL    ABS. IMM.  GRANS. 0.2 (H) 0.00 - 0.04 K/UL    DF AUTOMATED     METABOLIC PANEL, BASIC    Collection Time: 03/05/21  5:13 AM   Result Value Ref Range    Sodium 144 136 - 145 mmol/L    Potassium 3.5 3.5 - 5.1 mmol/L    Chloride 114 (H) 97 - 108 mmol/L    CO2 24 21 - 32 mmol/L    Anion gap 6 5 - 15 mmol/L    Glucose 115 (H) 65 - 100 mg/dL    BUN 7 6 - 20 MG/DL    Creatinine 0.52 (L) 0.55 - 1.02 MG/DL    BUN/Creatinine ratio 13 12 - 20      GFR est AA >60 >60 ml/min/1.73m2    GFR est non-AA >60 >60 ml/min/1.73m2    Calcium 7.3 (L) 8.5 - 10.1 MG/DL       Microbiology Data:       Blood: 2/28/21  Specimen Information: Blood        Component Value Flag Ref Range Units Status   Special Requests:      Final   No Special Requests    Culture result: Abnormal       Final   Staphylococcus aureus growing in all 4 bottles drawn    Culture result:      Final   Gram pos cocci in clusters   growing in all 4 bottles drawn   CALLED TO AND READ BACK BY   Yesy Womack on 3.1.21 at 12:13 by [email or web address]    Susceptibility    Staphylococcus aureus    Antibiotic Interpretation Value Method Comment   Clindamycin ($) Resistant Resistant ug/mL ANGELIKA    Ciprofloxacin ($) Susceptible <=0.5 ug/mL ANGELIKA    Daptomycin ($$$$$) Susceptible 0.25 ug/mL ANGELIKA    Doxycycline ($$) Susceptible <=0.5 ug/mL ANGELIKA    Erythromycin ($$$$) Resistant >=8 ug/mL ANGELIKA    Gentamicin ($) Susceptible <=0.5 ug/mL ANGELIKA    Levofloxacin ($) Susceptible 0.25 ug/mL ANGELIKA    Linezolid ($$$$$) Susceptible 2 ug/mL ANGELIKA    Moxifloxacin ($$$$) Susceptible <=0.25 ug/mL ANGELIKA    Oxacillin Susceptible 0.5 ug/mL ANGELIKA    Rifampin ($$$$) Susceptible <=0.5 ug/mL ANGELIKA Rifampin is not to be used for mono-therapy. Tetracycline Susceptible <=1 ug/mL ANGELIKA    Trimeth/Sulfa Susceptible <=10 ug/mL ANGELIKA    Vancomycin ($) Susceptible 1 ug/mL ANGELIKA        Blood 3/4/21  Special Requests:      Preliminary   NO SPECIAL REQUESTS    Culture result: Abnormal       Preliminary   GRAM POSITIVE COCCI IN CLUSTERS GROWING IN 2 OF 4 BOTTLES DRAWN (SITE = L. AC AND R HAND)    Culture result:      Preliminary   REMAINING BOTTLE(S) HAS/HAVE NO GROWTH SO FAR               Urine:  Special Requests:      Final   NO SPECIAL REQUESTS    Alma Count >100,000   COLONIES/mL      Final   Culture result: Abnormal       Final   STAPHYLOCOCCUS AUREUS    Susceptibility    Staphylococcus aureus    Antibiotic Interpretation Value Method Comment   Ciprofloxacin ($) Susceptible <=0.5 ug/mL ANGELIKA    Gentamicin ($) Susceptible <=0.5 ug/mL ANGELIKA    Nitrofurantoin Susceptible <=16 ug/mL ANGELIKA    Oxacillin Susceptible 0.5 ug/mL ANGELIKA    Rifampin ($$$$) Susceptible <=0.5 ug/mL ANGELIKA Rifampin is not to be used for mono-therapy. Tetracycline Susceptible <=1 ug/mL ANGELIKA    Trimeth/Sulfa Susceptible <=10 ug/mL ANGELIKA    Vancomycin ($) Susceptible 1 ug/mL ANGELIKA    Levofloxacin ($) Susceptible 0.5 ug/mL ANGELIKA    Linezolid ($$$$$) Susceptible 2 ug/mL ANGELIKA    Daptomycin ($$$$$) Susceptible 0.25 ug/mL ANGELIKA    Moxifloxacin ($$$$) Susceptible <=0.25 ug/mL ANGELIKA    Doxycycline ($$) Susceptible <=0.5 ug/mL ANGELIKA        Component Value Flag Ref Range Units Status   GDH ANTIGEN Negative   NEG   Final   C. difficile toxin Negative   NEG   Final   INTERPRETATION   NTXCD   Final   NEGATIVE FOR TOXIGENIC C. DIFFICILE                Imaging:   CT chest  FINDINGS:  The lungs are clear of mass, nodule, airspace disease or edema.  There is  opacification of peripheral lower lobe airways bilaterally.     The pulmonary arteries are well enhanced and no pulmonary emboli are identified.     There is no mediastinal or hilar adenopathy or mass. The aorta enhances normally  without evidence of aneurysm or dissection.     The visualized portions of the upper abdominal organs are normal.     IMPRESSION  No pulmonary process or septic emboli. Lower lobe branch bronchial  opacification likely reflecting  secretions or aspiration. CT ABD  FINDINGS: Motion artifact limits evaluation  LOWER THORAX: Left lower lobe airspace disease with a small left pleural  effusion. LIVER: No mass. BILIARY TREE: Gallbladder is within normal limits. CBD is not dilated. SPLEEN: Borderline enlarged spleen  PANCREAS: No mass or ductal dilatation. ADRENALS: Unremarkable. KIDNEYS: No mass, calculus, or hydronephrosis. STOMACH: Unremarkable. SMALL BOWEL: No dilatation or wall thickening. COLON: No dilatation or wall thickening. APPENDIX: Unremarkable  PERITONEUM: Free fluid in the pelvis  RETROPERITONEUM: No lymphadenopathy or aortic aneurysm. REPRODUCTIVE ORGANS: Unremarkable  URINARY BLADDER: Distended urinary bladder  BONES: No destructive bone lesion. ABDOMINAL WALL: No mass or hernia. ADDITIONAL COMMENTS: N/A     IMPRESSION  1. Limited by motion.     2.  Left lower lobe airspace disease and left effusion suspicious for pneumonia.     3. Distended urinary bladder.     4. Free fluid in the pelvis. No evidence of bowel obstruction or free  intraperitoneal air    TTE  · LV: Calculated LVEF is 71%. Normal cavity size, wall thickness, systolic function (ejection fraction normal) and diastolic function. Wall motion: normal. Normal left ventricular strain. · Image quality for this study was suboptimal.  · MV: Mitral valve non-specific thickening. Mild mitral valve prolapse of the posterior leaflet. Severe mitral valve regurgitation is present. Possible vegetation present on the posterior leaflet of the mitral valve. Posterior mitral leaflet vegetation is moderately sized.  Vegetation is localized on the atrial aspect of the posterior mitral leaflet. Echo Findings    Left Ventricle Normal cavity size, wall thickness, systolic function (ejection fraction normal) and diastolic function. The muscle mass is normal. The cavity shape is normal. Wall motion: normal. The calculated EF is 71%. End-systolic volume is normal. Normal left ventricular strain. Normal left ventricular diastolic pressure. End-diastolic volume is normal.   Left Atrium Normal cavity size. Right Ventricle Normal cavity size, wall thickness and global systolic function. Right Atrium Normal cavity size. Aortic Valve Aortic valve not well visualized. No stenosis and no regurgitation. Mitral Valve Mitral valve not well visualized. No stenosis. Mitral valve non-specific thickening. Mild mitral valve prolapse of the posterior leaflet. Severe regurgitation. The mitral regurgitant jet is eccentric. There is possible vegetation on the posterior leaflet of the mitral valve. Posterior leaflet vegetation is moderately sized. The vegetation is localized on the atrial aspect of the posterior leaflet. Tricuspid Valve Normal valve structure, no stenosis and no regurgitation. Pulmonic Valve Normal valve structure, no stenosis and no regurgitation. Aorta Normal aortic root, ascending aortic, and aortic arch. Pericardium Normal pericardium and no evidence of pericardial effusion       MRI     FINDINGS:  There are multiple foci of T2 hyperintensity in the cerebral white matter  predominantly periventricular and some possibly pericallosal in a nonspecific  pattern. There is no definitive associated restricted diffusion in these foci  although there is suggested minimal T2 shine through. There are several punctate foci of restricted diffusion in the right greater  than left posterior frontal and parietal lobes near the gray-white junction. This is in a pattern which would be consistent with emboli in this patient with  known cardiac valve vegetation.  A few tiny punctate foci of hypointensity  possible on gradient echo. SWI was not performed. Ventricular size and  configuration are normal. No definite acute intracranial hemorrhage, mass or  abnormal extra-axial fluid collections. Flow voids are present in vertebral  basilar and carotid artery systems. Mild mucosal thickening paranasal sinuses.     IMPRESSION  1. Multiple punctate foci restricted diffusion and some associated T2  hyperintensity gray-white junction greater in the right hemisphere suggesting  emboli. 2. Other multifocal white matter T2 hyperintensity may be more chronic and of  uncertain etiology. 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 and concern for brain emboli on MRI   Hx of IVDU , urine drug screen positive for amphetamines and THC  sepsis   + Rhinovirus, + Enterovirus on resp panel    hypernatremia   elevated inflammatory markers   mild elevation bilirubin and AST   altered mental status, encephalopathy   history of overdose per chart  thrombocytopenia  chart reported history of hepatitis C   chart reported history of anxiety, aggressive outbursts, schizoaffective disorder        Plan:  Repeat blood cx 3/5,   Blood cx + 4/4 bottles  2/28 MSSA and again 3/4/21 (2/4 bottles)  Renally dosed Cefazolin   KELLY --  being followed by CTS  MRI brain with \"  Multiple punctate foci restricted diffusion and some associated T2 hyperintensity gray-white junction greater in the right hemisphere suggesting emboli\"   Repeat blood cx Q 24-48 hours pending clearance   Rhinovirus and Enterovirus detected on respiratory panel -- supportive care   Resources for addiction medicine per primary team     Check Hep C RNA   Check HIV screen once consent obtained       Thank for the opportunity to participate in the care of this patient. Please contact with questions or concerns.            Wash Rasp, DO  12:35 PM

## 2021-03-05 NOTE — PROGRESS NOTES
Bedside shift change report given to Aniya (oncoming nurse) by Amado Bell (offgoing nurse). Report included the following information SBAR, Kardex, ED Summary, MAR, Recent Results and Cardiac Rhythm Sinus Tach.

## 2021-03-06 PROBLEM — I63.10 CEREBROVASCULAR ACCIDENT (CVA) DUE TO EMBOLISM OF PRECEREBRAL ARTERY (HCC): Status: ACTIVE | Noted: 2021-03-06

## 2021-03-06 LAB
ABO + RH BLD: NORMAL
ANION GAP SERPL CALC-SCNC: 6 MMOL/L (ref 5–15)
BASOPHILS # BLD: 0 K/UL (ref 0–0.1)
BASOPHILS NFR BLD: 0 % (ref 0–1)
BLOOD GROUP ANTIBODIES SERPL: NORMAL
BUN SERPL-MCNC: 5 MG/DL (ref 6–20)
BUN/CREAT SERPL: 12 (ref 12–20)
CALCIUM SERPL-MCNC: 7.8 MG/DL (ref 8.5–10.1)
CHLORIDE SERPL-SCNC: 111 MMOL/L (ref 97–108)
CO2 SERPL-SCNC: 24 MMOL/L (ref 21–32)
CREAT SERPL-MCNC: 0.43 MG/DL (ref 0.55–1.02)
DIFFERENTIAL METHOD BLD: ABNORMAL
EOSINOPHIL # BLD: 0.1 K/UL (ref 0–0.4)
EOSINOPHIL NFR BLD: 1 % (ref 0–7)
ERYTHROCYTE [DISTWIDTH] IN BLOOD BY AUTOMATED COUNT: 14.1 % (ref 11.5–14.5)
GLUCOSE SERPL-MCNC: 132 MG/DL (ref 65–100)
HCT VFR BLD AUTO: 20.5 % (ref 35–47)
HGB BLD-MCNC: 6.7 G/DL (ref 11.5–16)
IMM GRANULOCYTES # BLD AUTO: 0.1 K/UL (ref 0–0.04)
IMM GRANULOCYTES NFR BLD AUTO: 1 % (ref 0–0.5)
LYMPHOCYTES # BLD: 1.8 K/UL (ref 0.8–3.5)
LYMPHOCYTES NFR BLD: 16 % (ref 12–49)
MCH RBC QN AUTO: 28.8 PG (ref 26–34)
MCHC RBC AUTO-ENTMCNC: 32.7 G/DL (ref 30–36.5)
MCV RBC AUTO: 88 FL (ref 80–99)
MONOCYTES # BLD: 0.7 K/UL (ref 0–1)
MONOCYTES NFR BLD: 6 % (ref 5–13)
NEUTS SEG # BLD: 8.6 K/UL (ref 1.8–8)
NEUTS SEG NFR BLD: 76 % (ref 32–75)
NRBC # BLD: 0 K/UL (ref 0–0.01)
NRBC BLD-RTO: 0 PER 100 WBC
PLATELET # BLD AUTO: 221 K/UL (ref 150–400)
PMV BLD AUTO: 10.8 FL (ref 8.9–12.9)
POTASSIUM SERPL-SCNC: 3.8 MMOL/L (ref 3.5–5.1)
RBC # BLD AUTO: 2.33 M/UL (ref 3.8–5.2)
RBC MORPH BLD: ABNORMAL
SODIUM SERPL-SCNC: 141 MMOL/L (ref 136–145)
SPECIMEN EXP DATE BLD: NORMAL
WBC # BLD AUTO: 11.3 K/UL (ref 3.6–11)

## 2021-03-06 PROCEDURE — 36415 COLL VENOUS BLD VENIPUNCTURE: CPT

## 2021-03-06 PROCEDURE — 74011250636 HC RX REV CODE- 250/636: Performed by: INTERNAL MEDICINE

## 2021-03-06 PROCEDURE — 99223 1ST HOSP IP/OBS HIGH 75: CPT | Performed by: PSYCHIATRY & NEUROLOGY

## 2021-03-06 PROCEDURE — 74011250637 HC RX REV CODE- 250/637: Performed by: NURSE PRACTITIONER

## 2021-03-06 PROCEDURE — 80048 BASIC METABOLIC PNL TOTAL CA: CPT

## 2021-03-06 PROCEDURE — 74011250636 HC RX REV CODE- 250/636: Performed by: FAMILY MEDICINE

## 2021-03-06 PROCEDURE — 86900 BLOOD TYPING SEROLOGIC ABO: CPT

## 2021-03-06 PROCEDURE — 74011250637 HC RX REV CODE- 250/637: Performed by: INTERNAL MEDICINE

## 2021-03-06 PROCEDURE — 74011250636 HC RX REV CODE- 250/636: Performed by: NURSE PRACTITIONER

## 2021-03-06 PROCEDURE — 65660000001 HC RM ICU INTERMED STEPDOWN

## 2021-03-06 PROCEDURE — 74011000250 HC RX REV CODE- 250: Performed by: INTERNAL MEDICINE

## 2021-03-06 PROCEDURE — 85025 COMPLETE CBC W/AUTO DIFF WBC: CPT

## 2021-03-06 RX ORDER — FAMOTIDINE 20 MG/1
20 TABLET, FILM COATED ORAL 2 TIMES DAILY
Status: DISCONTINUED | OUTPATIENT
Start: 2021-03-06 | End: 2021-03-09

## 2021-03-06 RX ADMIN — Medication 10 ML: at 21:23

## 2021-03-06 RX ADMIN — ENOXAPARIN SODIUM 40 MG: 40 INJECTION SUBCUTANEOUS at 10:16

## 2021-03-06 RX ADMIN — BUPRENORPHINE HYDROCHLORIDE AND NALOXONE HYDROCHLORIDE DIHYDRATE 1 TABLET: 8; 2 TABLET SUBLINGUAL at 18:05

## 2021-03-06 RX ADMIN — KETOROLAC TROMETHAMINE 15 MG: 30 INJECTION, SOLUTION INTRAMUSCULAR at 05:13

## 2021-03-06 RX ADMIN — WATER 2 G: 1 INJECTION INTRAMUSCULAR; INTRAVENOUS; SUBCUTANEOUS at 05:13

## 2021-03-06 RX ADMIN — Medication 10 ML: at 14:38

## 2021-03-06 RX ADMIN — LORAZEPAM 4 MG: 2 INJECTION INTRAMUSCULAR; INTRAVENOUS at 18:05

## 2021-03-06 RX ADMIN — BUPRENORPHINE HYDROCHLORIDE AND NALOXONE HYDROCHLORIDE DIHYDRATE 1 TABLET: 8; 2 TABLET SUBLINGUAL at 10:16

## 2021-03-06 RX ADMIN — LORAZEPAM 2 MG: 2 INJECTION INTRAMUSCULAR; INTRAVENOUS at 22:19

## 2021-03-06 RX ADMIN — WATER 2 G: 1 INJECTION INTRAMUSCULAR; INTRAVENOUS; SUBCUTANEOUS at 21:23

## 2021-03-06 RX ADMIN — KETOROLAC TROMETHAMINE 15 MG: 30 INJECTION, SOLUTION INTRAMUSCULAR at 14:38

## 2021-03-06 RX ADMIN — WATER 2 G: 1 INJECTION INTRAMUSCULAR; INTRAVENOUS; SUBCUTANEOUS at 14:38

## 2021-03-06 RX ADMIN — Medication 3 MG: at 21:23

## 2021-03-06 RX ADMIN — LORAZEPAM 2 MG: 2 INJECTION INTRAMUSCULAR; INTRAVENOUS at 11:38

## 2021-03-06 RX ADMIN — MULTIPLE VITAMINS W/ MINERALS TAB 1 TABLET: TAB at 10:16

## 2021-03-06 RX ADMIN — FAMOTIDINE 20 MG: 20 TABLET, FILM COATED ORAL at 19:00

## 2021-03-06 RX ADMIN — Medication 10 ML: at 05:13

## 2021-03-06 NOTE — CONSULTS
NEUROLOGY CONSULT NOTE    Name Alexa Redding Age 30 y.o.   MRN [id number]  1990     Consulting Physician: Mark Salas MD      Chief Complaint:  AMS, embolic strokes     Assessment/Plan:     Active Problems:    Sepsis (HCC) (2021)      Cerebrovascular accident (CVA) due to embolism of precerebral artery (HCC) (3/6/2021)      30 year old female with a h/o substance abuse, HCV admitted with +Rhinovirus/Enterovirus and +MSSA bacteremia. MRI Brain was performed this admission with multiple punctate strokes involving the right hemisphere c/w embolic mechanism. TTE reveals possible vegetation of the posterior MV c/w endocarditis. KELLY pending. Due to concern for IE contributing to recent embolic infarcts, would advise against antiplatelet therapy/anticogulation due to risk for hemorrhage. Will obtain FLP and advise starting statin therapy if LDL>/=70. SBP goal<140. PT/OT.   Please call if additional questions/concerns.       Thank you very much for this referral. I appreciate the opportunity to participate in this patient's care.       History of Present Illness:      This is a 30 y.o.  female, we were asked to see for AMS, embolic strokes on imaging. PMH notable for substance abuse, HCV. She is admitted fever/cough with respiratory panel +Rhinovirus/Enterovirus and +MSSA bacteremia. TTE completed this admission concerning for vegetation of the posterior MV c/w endocarditis. MRI Brain was performed this admission with multiple punctate strokes involving the right hemisphere c/w embolic mechanism. She denies any focal deficits including weakness, numbness, speech or vision loss today. She is complaining of diffuse body pain.     No Known Allergies     Prior to Admission medications    Medication Sig Start Date End Date Taking? Authorizing Provider   cephALEXin (Keflex) 250 mg capsule Take 1 Cap by mouth three (3) times daily. 21   Eleazar Trejo MD       Past Medical History:  Diagnosis Date    Aggressive outburst     Anxiety disorder     Hepatitis C     IV drug abuse ([facility] [address]         History reviewed. No pertinent surgical history. Social History     Tobacco Use    Smoking status: Former Smoker     Types: Cigarettes    Smokeless tobacco: Never Used    Tobacco comment: pt refused   Substance Use Topics    Alcohol use: Not Currently        History reviewed. No pertinent family history. Review of Systems:   Comprehensive review of systems: non-cooperative     Exam:     Visit Vitals  /86 (BP 1 Location: Right arm, BP Patient Position: At rest)   Pulse (!) 110   Temp 98.4 °F (36.9 °C)   Resp 23   Ht 5' 6\" (1.676 m)   Wt 120 lb 1.6 oz (54.5 kg)   SpO2 99%   BMI 19.38 kg/m²        General: Patient in no apparent distress   Head: Normocephalic, atraumatic, anicteric sclera   Lungs:  Clear to auscultation bilaterally, No wheezes or rubs   Cardiac: Regular rate and rhythm with no murmurs. Abd: No tenderness on palpation   Ext: No pedal edema   Skin: No overt signs of rash     Neurological Exam:  Mental Status: Alert and oriented to person place and time. Non-cooperative with parts of examination. Speech: Fluent no aphasia or dysarthria. Cranial Nerves:   Intact visual fields. Facial movement is symmetric. Palate is midline. Normal sternocleidomastoid strength. Hearing is intact bilaterally. Eyes: PERRL, EOM's full, no nystagmus, no ptosis. Motor:  BETHEA AG x 4. Normal bulk and tone. Reflexes:   Deep tendon reflexes-non cooperative. Plantar response is downgoing b/l. Sensory:   Symmetrically intact to noxious stimulation   Gait:  Gait is deferred   Tremor:   No tremor noted. Cerebellar:  Unable to assess   Neurovascular: No carotid bruits. Imaging  CT Results (maximum last 3):   Results from [facility] encounter on 02/28/21   CT ABD PELV W CONT    Narrative EXAM: CT ABD PELV W CONT    INDICATION: diarrhea and severe sepsis    COMPARISON: No comparisons     CONTRAST: 100 mL of Isovue-370. TECHNIQUE:   Following the uneventful intravenous administration of contrast, thin axial  images were obtained through the abdomen and pelvis. Coronal and sagittal  reconstructions were generated. Oral contrast was not administered. CT dose  reduction was achieved through use of a standardized protocol tailored for this  examination and automatic exposure control for dose modulation. FINDINGS: Motion artifact limits evaluation  LOWER THORAX: Left lower lobe airspace disease with a small left pleural  effusion. LIVER: No mass. BILIARY TREE: Gallbladder is within normal limits. CBD is not dilated. SPLEEN: Borderline enlarged spleen  PANCREAS: No mass or ductal dilatation. ADRENALS: Unremarkable. KIDNEYS: No mass, calculus, or hydronephrosis. STOMACH: Unremarkable. SMALL BOWEL: No dilatation or wall thickening. COLON: No dilatation or wall thickening. APPENDIX: Unremarkable  PERITONEUM: Free fluid in the pelvis  RETROPERITONEUM: No lymphadenopathy or aortic aneurysm. REPRODUCTIVE ORGANS: Unremarkable  URINARY BLADDER: Distended urinary bladder  BONES: No destructive bone lesion. ABDOMINAL WALL: No mass or hernia. ADDITIONAL COMMENTS: N/A      Impression 1. Limited by motion. 2.  Left lower lobe airspace disease and left effusion suspicious for pneumonia. 3.   Distended urinary bladder. 4.  Free fluid in the pelvis. No evidence of bowel obstruction or free  intraperitoneal air   CTA CHEST W OR W WO CONT    Narrative EXAM: CTA CHEST W OR W WO CONT    INDICATION: eval for septic emboli    COMPARISON: None. CONTRAST: 80 mL of Isovue-370. TECHNIQUE:   Precontrast  images were obtained to localize the volume for acquisition. Multislice helical CT arteriography was performed from the diaphragm to the  thoracic inlet during uneventful rapid bolus intravenous contrast  administration. Lung and soft tissue windows were generated.   Coronal and  sagittal images were generated and 3D post processing consisting of coronal  maximum intensity images was performed. CT dose reduction was achieved through  use of a standardized protocol tailored for this examination and automatic  exposure control for dose modulation. FINDINGS:  The lungs are clear of mass, nodule, airspace disease or edema. There is  opacification of peripheral lower lobe airways bilaterally. The pulmonary arteries are well enhanced and no pulmonary emboli are identified. There is no mediastinal or hilar adenopathy or mass. The aorta enhances normally  without evidence of aneurysm or dissection. The visualized portions of the upper abdominal organs are normal.      Impression No pulmonary process or septic emboli. Lower lobe branch bronchial  opacification likely reflecting  secretions or aspiration. Results from [facility] encounter on 02/28/21   CT HEAD WO CONT    Narrative Axial images from the skull base to the vertex were obtained without the use of  contrast. Bone windows were reviewed. Sagittal and coronal reformatted images  were also reviewed. All CT scans at this facility are performed using dose  reduction optimization techniques as appropriate to a performed exam including  the following:   automated exposure control, adjustments of the mA and/or kV  according to patient size, or use of iterative reconstruction technique. INDICATION: Headache. There is no evidence of hemorrhage. No mass or mass effect is seen. There is no  acute ischemia. Ventricles, sulci and cisterns are intact. Normal gray matter/white matter  differentiation. Bone windows show no acute abnormalities. The visualized portions of the orbits,  paranasal sinuses and mastoid air cells are unremarkable. Review of the  reconstructed images show no additional findings. Impression Negative CT of the brain. MRI Results (maximum last 3):   Results from [facility] encounter on 02/28/21   MRI BRAIN WO CONT    Narrative EXAM:  MRI BRAIN WO CONT  INDICATION:  septic emboli,  TECHNIQUE:  Sagittal T1, axial FLAIR, T2,T1 and gradient echo images as well as coronal T2  weighted images and axial diffusion weighted images of the head were obtained. COMPARISON:  CT head 10/28/21  FINDINGS:  There are multiple foci of T2 hyperintensity in the cerebral white matter  predominantly periventricular and some possibly pericallosal in a nonspecific  pattern. There is no definitive associated restricted diffusion in these foci  although there is suggested minimal T2 shine through. There are several punctate foci of restricted diffusion in the right greater  than left posterior frontal and parietal lobes near the gray-white junction. This is in a pattern which would be consistent with emboli in this patient with  known cardiac valve vegetation. A few tiny punctate foci of hypointensity  possible on gradient echo. SWI was not performed. Ventricular size and  configuration are normal. No definite acute intracranial hemorrhage, mass or  abnormal extra-axial fluid collections. Flow voids are present in vertebral  basilar and carotid artery systems. Mild mucosal thickening paranasal sinuses. Impression 1. Multiple punctate foci restricted diffusion and some associated T2  hyperintensity gray-white junction greater in the right hemisphere suggesting  emboli. 2. Other multifocal white matter T2 hyperintensity may be more chronic and of  uncertain etiology.        Lab Review  Lab Results   Component Value Date/Time    WBC 11.3 (H) 03/06/2021 03:27 AM    HCT 20.5 (L) 03/06/2021 03:27 AM    HGB 6.7 (L) 03/06/2021 03:27 AM    PLATELET 541 84/88/4663 03:27 AM     Lab Results   Component Value Date/Time    Sodium 141 03/06/2021 03:27 AM    Potassium 3.8 03/06/2021 03:27 AM    Chloride 111 (H) 03/06/2021 03:27 AM    CO2 24 03/06/2021 03:27 AM    Glucose 132 (H) 03/06/2021 03:27 AM    BUN 5 (L) 03/06/2021 03:27 AM    Creatinine 0.43 (L) 03/06/2021 03:27 AM    Calcium 7.8 (L) 03/06/2021 03:27 AM     No components found for: TROPQUANT  No results found for: LUTHER    Signed:  Chandra Anguiano.  Cl Noble DO  3/6/2021  3:42 PM

## 2021-03-06 NOTE — PROGRESS NOTES
[address] [facility]  Hospitalist Group                                                                                          Hospitalist Progress Note  Riya Echols MD  Answering service: [phone] from in house phone        Date of Service:  3/6/2021  NAME:  Dawit Roy  :  1990  MRN:  [id number]      Please note that this dictation was completed with "[employer] the computer voice recognition software. Quite often unanticipated grammatical, syntax, homophones, and other interpretive errors are inadvertently transcribed by the computer software. Please disregard these errors. Please excuse any errors that have escaped final proofreading. Admission Summary:   Jonathan Matta is a 31 y.o.  female who presents with above symptoms. Started suddenly. Progressively worse. Severe now. Patient with altered mental status not willing and unable to provide information. Mother reports she is adult but lives at her house. Not sure when she used any type of IV drugs last time. Possibly yesterday. Reports she has been dealing with IV drug abuse for many years. Apparently after an . Mother reports she has been multiple times in, due to narcotic overdose, reversed by nasal Narcan. She also has history of amphetamine use, unknown last use. Last 4 days unable to ambulate. She has been suffering from severe myalgia and fever occasional cough, negative for Covid no exposure. Mother reports she was negative as well. Patient and mother report, never had similar symptoms in the past.  She takes Suboxone and Xanax as needed for anxiety. Not have any other prescriptions.       Interval history / Subjective:   Patient seen and examined today. Labs imaging and chart reviewed.     Mental status remains poor today  Restless at time  Lengthy discussion with patient's sister     Assessment & Plan:     Severe Sepsis due to staph aureus bacteremia POA   Staph aureus bacteremia 4 x 4, and also the presence of leukocytosis with elevated CRP and tachycardia  Troponins elevated as well but flat  IV drug abuser, per mother shot heroin few days back  Started on vancomycin and Zosyn, changed to cefepime and Flagyl by ID  Now changed to cefazolin  Repeat blood cultures   CT chest shows a may be possibility of some aspiration  In the bronchi, but no evident pneumonia  Respiratory PCR negative including SARS  Echocardiogram shows a posterior mitral valve vegetation and severe MR. CT surgery consulted, they will continue to follow  MRI of the brain ordered  Prognosis remains poor  Persistent fever and bacteremia    Diarrhea. C. difficile negative  CT abdomen unremarkable    Hypernatremia and hypokalemia  Due to free water loss from a diarrhea. Improving    Altered mental status  Encephalopathy from sepsis and likely withdrawal from drugs, heroin withdrawal and benzo withdrawal, and embolic events  CT of the head without any acute abnormality. MRI with Multiple punctate foci restricted diffusion and some associated T2  hyperintensity gray-white junction greater in the right hemisphere suggesting emboli. Seen by Neurology     Worsening anemia  Etiology unclear, likely multifactorial, watch for GI bleed  Recheck hgb  Stopped Lovenox  PUD prophylaxis  Transfuse prn    Possibility of her benzo withdrawal and heroin withdrawal and/or amphetamine overdose  Started patient back on Xanax and her Suboxone  Symptomatic treatment needed  IV Ativan as needed. See above  Mental status remains tenuous    Hypokalemia  IV fluids with potassium  Resolved    Thrombocytopenia  Due to sepsis, improved    Body mass index is 19.38 kg/m². History of hepatitis C.       Code status: FULL  DVT prophylaxis: scd     Care Plan discussed with: Patient/Family  Anticipated Disposition: Home w/Family  Anticipated Discharge: Greater than 48 hours     Hospital Problems  Never Reviewed          Codes Class Noted POA Cerebrovascular accident (CVA) due to embolism of precerebral artery ([facility] [address] ICD-10-CM: I63.10  ICD-9-CM: 434.11  3/6/2021 Unknown        Sepsis ([facility] [address] ICD-10-CM: A41.9  ICD-9-CM: 038.9, 995.91  2/28/2021 Unknown                Review of Systems:   A comprehensive review of systems was negative except for that written in the HPI. Vital Signs:    Last 24hrs VS reviewed since prior progress note. Most recent are:  Visit Vitals  /75 (BP 1 Location: Right arm, BP Patient Position: Sitting)   Pulse (!) 107   Temp 98 °F (36.7 °C)   Resp 23   Ht 5' 6\" (1.676 m)   Wt 54.5 kg (120 lb 1.6 oz)   SpO2 100%   BMI 19.38 kg/m²         Intake/Output Summary (Last 24 hours) at 3/6/2021 1806  Last data filed at 3/6/2021 0800  Gross per 24 hour   Intake    Output 2125 ml   Net -2125 ml        Physical Examination:     I had a face to face encounter with this patient and independently examined them on 03/06/21 as outlined below:        Constitutional:  awake and talking today    ENT:  Oral mucosa moist, oropharynx benign. Resp:  CTA bilaterally. No wheezing/rhonchi/rales. No accessory muscle use   CV:  Regular rhythm, normal rate, no murmurs, gallops, rubs    GI:  Soft, non distended, non tender.  normoactive bowel sounds, no hepatosplenomegaly     Musculoskeletal:  Moving around in the bed    Neurologic: Alert and oriented             Data Review:    Review and/or order of clinical lab test      Labs:     Recent Labs     03/06/21 0327 03/05/21  0513   WBC 11.3* 12.2*   HGB 6.7* 7.2*   HCT 20.5* 21.0*    162     Recent Labs     03/06/21  0327 03/05/21  0513 03/04/21  1426 03/04/21  0351    144 141 142   K 3.8 3.5 3.0* 2.4*   * 114* 109* 111*   CO2 24 24 26 23   BUN 5* 7 9 15   CREA 0.43* 0.52* 0.52* 0.52*   * 115* 101* 134*   CA 7.8* 7.3* 7.5* 8.1*   MG  --   --   --  1.8   PHOS  --   --   --  2.9     No results for input(s): ALT, AP, TBIL, TBILI, TP, ALB, GLOB, GGT, AML, LPSE in the last 72 hours.    No lab exists for component: SGOT, GPT, AMYP, HLPSE  No results for input(s): INR, PTP, APTT, INREXT, INREXT in the last 72 hours. No results for input(s): FE, TIBC, PSAT, FERR in the last 72 hours. No results found for: FOL, RBCF   No results for input(s): PH, PCO2, PO2 in the last 72 hours. No results for input(s): CPK, CKNDX, TROIQ in the last 72 hours.     No lab exists for component: CPKMB  No results found for: CHOL, CHOLX, CHLST, CHOLV, HDL, HDLP, LDL, LDLC, DLDLP, TGLX, TRIGL, TRIGP, CHHD, CHHDX  Lab Results   Component Value Date/Time    Glucose (POC) 132 (H) 03/03/2021 05:27 AM    Glucose (POC) 118 (H) 03/03/2021 12:18 AM     Lab Results   Component Value Date/Time    Color DARK YELLOW 02/28/2021 06:12 PM    Appearance CLEAR 02/28/2021 06:12 PM    Specific gravity 1.020 02/28/2021 06:12 PM    Specific gravity 1.016 02/28/2021 06:00 AM    pH (UA) 6.5 02/28/2021 06:12 PM    Protein 100 (A) 02/28/2021 06:12 PM    Glucose Negative 02/28/2021 06:12 PM    Ketone 15 (A) 02/28/2021 06:12 PM    Bilirubin Negative 02/28/2021 06:00 AM    Urobilinogen 2.0 (H) 02/28/2021 06:12 PM    Nitrites Positive (A) 02/28/2021 06:12 PM    Leukocyte Esterase MODERATE (A) 02/28/2021 06:12 PM    Epithelial cells FEW 02/28/2021 06:12 PM    Bacteria 1+ (A) 02/28/2021 06:12 PM    WBC 20-50 02/28/2021 06:12 PM    RBC >100 (H) 02/28/2021 06:12 PM         Medications Reviewed:     Current Facility-Administered Medications   Medication Dose Route Frequency    famotidine (PEPCID) tablet 20 mg  20 mg Oral BID    multivitamin, tx-iron-ca-min (THERA-M w/ IRON) tablet 1 Tab  1 Tab Oral DAILY    ceFAZolin (ANCEF) 2 g in sterile water (preservative free) 20 mL IV syringe  2 g IntraVENous Q8H    melatonin tablet 3 mg  3 mg Oral QHS PRN    nicotine (NICODERM CQ) 21 mg/24 hr patch 1 Patch  1 Patch TransDERmal DAILY    LORazepam (ATIVAN) injection 4 mg  4 mg IntraVENous Q4H PRN    LORazepam (ATIVAN) injection 2 mg  2 mg IntraVENous Q2H PRN    influenza vaccine 2020-21 (6 mos+)(PF) (FLUARIX/FLULAVAL/FLUZONE QUAD) injection 0.5 mL  0.5 mL IntraMUSCular PRIOR TO DISCHARGE    buprenorphine-naloxone (SUBOXONE) 8-2mg SL tablet  1 Tab SubLINGual BID    ketorolac (TORADOL) injection 15 mg  15 mg IntraVENous Q6H PRN    sodium chloride (NS) flush 5-40 mL  5-40 mL IntraVENous Q8H    sodium chloride (NS) flush 5-40 mL  5-40 mL IntraVENous PRN    acetaminophen (TYLENOL) tablet 650 mg  650 mg Oral Q6H PRN    Or    acetaminophen (TYLENOL) suppository 650 mg  650 mg Rectal Q6H PRN    polyethylene glycol (MIRALAX) packet 17 g  17 g Oral DAILY PRN    promethazine (PHENERGAN) tablet 12.5 mg  12.5 mg Oral Q6H PRN    Or    ondansetron (ZOFRAN) injection 4 mg  4 mg IntraVENous Q6H PRN     ______________________________________________________________________  EXPECTED LENGTH OF STAY: 4d 19h  ACTUAL LENGTH OF STAY:          6                 Elbert Boyle MD

## 2021-03-06 NOTE — PROGRESS NOTES
Problem: Falls - Risk of  Goal: *Absence of Falls  Description: Document Deniz Allred Fall Risk and appropriate interventions in the flowsheet. Outcome: Progressing Towards Goal  Note: Fall Risk Interventions:  Mobility Interventions: Bed/chair exit alarm, Patient to call before getting OOB    Mentation Interventions: Bed/chair exit alarm, Adequate sleep, hydration, pain control    Medication Interventions: Bed/chair exit alarm, Patient to call before getting OOB    Elimination Interventions: Call light in reach, Bed/chair exit alarm              Problem: Pressure Injury - Risk of  Goal: *Prevention of pressure injury  Description: Document Terrance Scale and appropriate interventions in the flowsheet.   Outcome: Progressing Towards Goal  Note: Pressure Injury Interventions:  Sensory Interventions: Assess need for specialty bed, Assess changes in LOC    Moisture Interventions: Absorbent underpads, Apply protective barrier, creams and emollients    Activity Interventions: Assess need for specialty bed, Increase time out of bed    Mobility Interventions: Assess need for specialty bed    Nutrition Interventions: Document food/fluid/supplement intake, Offer support with meals,snacks and hydration    Friction and Shear Interventions: Apply protective barrier, creams and emollients, HOB 30 degrees or less

## 2021-03-06 NOTE — PROGRESS NOTES
Hospitalist Night Cover     Name: Jovon Dorsey  YOB: 1990      Overnight update:        Paged by RN - AM HGB 6.7 down from 7.2 Progressive decline over the past several days.  Tachycardia without hypotension  - Defer transfusion decision to primary team given patient's waxing/waning mentation  - FOBT and type/screen ordered     Saintclair Sailor FNP-C, [facility]  [phone] or [place]

## 2021-03-06 NOTE — PROGRESS NOTES
18- Pt stated to nurse, \"would 20,000 people die if I smoked a cigarette in my room? It would only be like 5, right? \" RN expressed to pt the dangers of smoking in the hospital and expressed that not only was it highly dangerous, it is also against hospital policy. 0200- RN found cigarettes in pt's bed. Pt stated, \"I wasn't going to smoke them, I just like to hold them. \" RN placed cigarettes in locked drawer. RN notified dayshift RN. 46- RN notified NP, Louie Ryan, of pt's Hgb 6.7. FOBT and type and screen ordered. Verbal shift change report given to Kristy Osuna (oncoming nurse) by Pam Hinds RN (offgoing nurse). Report included the following information SBAR, Kardex, ED Summary, Procedure Summary, Intake/Output, MAR, Accordion, Recent Results, Med Rec Status and Cardiac Rhythm ST. Patient Vitals for the past 12 hrs:   Temp Pulse Resp BP SpO2   03/06/21 0757 97.6 °F (36.4 °C) (!) 109 20 125/71 96 %   03/05/21 2255 97.9 °F (36.6 °C) (!) 121 16 121/81 100 %       Last 3 Recorded Weights in this Encounter    03/03/21 1117 03/05/21 0515 03/06/21 0626   Weight: 51.7 kg (114 lb) 57 kg (125 lb 11.2 oz) 54.5 kg (120 lb 1.6 oz)       Weight variance noted. Pt weighed in bed with one blanket, one pillow, one sheet.

## 2021-03-07 LAB
ALBUMIN SERPL-MCNC: 1.8 G/DL (ref 3.5–5)
ALBUMIN/GLOB SERPL: 0.4 {RATIO} (ref 1.1–2.2)
ALP SERPL-CCNC: 90 U/L (ref 45–117)
ALT SERPL-CCNC: 18 U/L (ref 12–78)
ANION GAP SERPL CALC-SCNC: 6 MMOL/L (ref 5–15)
AST SERPL-CCNC: 20 U/L (ref 15–37)
BASOPHILS # BLD: 0 K/UL (ref 0–0.1)
BASOPHILS NFR BLD: 0 % (ref 0–1)
BILIRUB SERPL-MCNC: 0.4 MG/DL (ref 0.2–1)
BUN SERPL-MCNC: 6 MG/DL (ref 6–20)
BUN/CREAT SERPL: 14 (ref 12–20)
CALCIUM SERPL-MCNC: 8.5 MG/DL (ref 8.5–10.1)
CHLORIDE SERPL-SCNC: 111 MMOL/L (ref 97–108)
CO2 SERPL-SCNC: 25 MMOL/L (ref 21–32)
CREAT SERPL-MCNC: 0.44 MG/DL (ref 0.55–1.02)
DIFFERENTIAL METHOD BLD: ABNORMAL
EOSINOPHIL # BLD: 0.2 K/UL (ref 0–0.4)
EOSINOPHIL NFR BLD: 2 % (ref 0–7)
ERYTHROCYTE [DISTWIDTH] IN BLOOD BY AUTOMATED COUNT: 14.1 % (ref 11.5–14.5)
GLOBULIN SER CALC-MCNC: 4.4 G/DL (ref 2–4)
GLUCOSE SERPL-MCNC: 100 MG/DL (ref 65–100)
HCT VFR BLD AUTO: 21.5 % (ref 35–47)
HCV GENOTYPE: NORMAL
HCV RNA SERPL NAA+PROBE-ACNC: NORMAL IU/ML
HCV RNA SERPL NAA+PROBE-ACNC: NORMAL IU/ML
HCV RNA SERPL NAA+PROBE-LOG IU: NORMAL LOG10 IU/ML
HCV RNA SERPL NAA+PROBE-LOG IU: NORMAL LOG10 IU/ML
HGB BLD-MCNC: 7.1 G/DL (ref 11.5–16)
IMM GRANULOCYTES # BLD AUTO: 0.1 K/UL (ref 0–0.04)
IMM GRANULOCYTES NFR BLD AUTO: 1 % (ref 0–0.5)
LYMPHOCYTES # BLD: 1.8 K/UL (ref 0.8–3.5)
LYMPHOCYTES NFR BLD: 16 % (ref 12–49)
MAGNESIUM SERPL-MCNC: 2.1 MG/DL (ref 1.6–2.4)
MCH RBC QN AUTO: 29.2 PG (ref 26–34)
MCHC RBC AUTO-ENTMCNC: 33 G/DL (ref 30–36.5)
MCV RBC AUTO: 88.5 FL (ref 80–99)
MONOCYTES # BLD: 0.7 K/UL (ref 0–1)
MONOCYTES NFR BLD: 6 % (ref 5–13)
NEUTS SEG # BLD: 8.2 K/UL (ref 1.8–8)
NEUTS SEG NFR BLD: 75 % (ref 32–75)
NRBC # BLD: 0 K/UL (ref 0–0.01)
NRBC BLD-RTO: 0 PER 100 WBC
PLATELET # BLD AUTO: 339 K/UL (ref 150–400)
PMV BLD AUTO: 10.2 FL (ref 8.9–12.9)
POTASSIUM SERPL-SCNC: 3.5 MMOL/L (ref 3.5–5.1)
PROT SERPL-MCNC: 6.2 G/DL (ref 6.4–8.2)
RBC # BLD AUTO: 2.43 M/UL (ref 3.8–5.2)
SODIUM SERPL-SCNC: 142 MMOL/L (ref 136–145)
TEST INFORMATION, 550045: NORMAL
TEST INFORMATION, 550045: NORMAL
WBC # BLD AUTO: 10.9 K/UL (ref 3.6–11)

## 2021-03-07 PROCEDURE — 74011250636 HC RX REV CODE- 250/636: Performed by: INTERNAL MEDICINE

## 2021-03-07 PROCEDURE — 74011250637 HC RX REV CODE- 250/637: Performed by: FAMILY MEDICINE

## 2021-03-07 PROCEDURE — 74011250637 HC RX REV CODE- 250/637: Performed by: NURSE PRACTITIONER

## 2021-03-07 PROCEDURE — 36415 COLL VENOUS BLD VENIPUNCTURE: CPT

## 2021-03-07 PROCEDURE — 65660000001 HC RM ICU INTERMED STEPDOWN

## 2021-03-07 PROCEDURE — 83735 ASSAY OF MAGNESIUM: CPT

## 2021-03-07 PROCEDURE — 74011000250 HC RX REV CODE- 250: Performed by: INTERNAL MEDICINE

## 2021-03-07 PROCEDURE — 87040 BLOOD CULTURE FOR BACTERIA: CPT

## 2021-03-07 PROCEDURE — 80053 COMPREHEN METABOLIC PANEL: CPT

## 2021-03-07 PROCEDURE — 83036 HEMOGLOBIN GLYCOSYLATED A1C: CPT

## 2021-03-07 PROCEDURE — 80061 LIPID PANEL: CPT

## 2021-03-07 PROCEDURE — 85025 COMPLETE CBC W/AUTO DIFF WBC: CPT

## 2021-03-07 RX ORDER — HALOPERIDOL 5 MG/ML
5 INJECTION INTRAMUSCULAR
Status: DISCONTINUED | OUTPATIENT
Start: 2021-03-07 | End: 2021-03-12

## 2021-03-07 RX ADMIN — HALOPERIDOL LACTATE 5 MG: 5 INJECTION, SOLUTION INTRAMUSCULAR at 15:50

## 2021-03-07 RX ADMIN — LORAZEPAM 2 MG: 2 INJECTION INTRAMUSCULAR; INTRAVENOUS at 22:49

## 2021-03-07 RX ADMIN — WATER 2 G: 1 INJECTION INTRAMUSCULAR; INTRAVENOUS; SUBCUTANEOUS at 07:07

## 2021-03-07 RX ADMIN — Medication 10 ML: at 07:12

## 2021-03-07 RX ADMIN — ACETAMINOPHEN 650 MG: 325 TABLET ORAL at 00:55

## 2021-03-07 RX ADMIN — LORAZEPAM 2 MG: 2 INJECTION INTRAMUSCULAR; INTRAVENOUS at 07:07

## 2021-03-07 RX ADMIN — LORAZEPAM 2 MG: 2 INJECTION INTRAMUSCULAR; INTRAVENOUS at 20:33

## 2021-03-07 RX ADMIN — ACETAMINOPHEN 650 MG: 325 TABLET ORAL at 23:07

## 2021-03-07 RX ADMIN — Medication 3 MG: at 23:37

## 2021-03-07 RX ADMIN — ACETAMINOPHEN 650 MG: 325 TABLET ORAL at 15:30

## 2021-03-07 RX ADMIN — LORAZEPAM 2 MG: 2 INJECTION INTRAMUSCULAR; INTRAVENOUS at 15:30

## 2021-03-07 RX ADMIN — Medication 10 ML: at 14:04

## 2021-03-07 RX ADMIN — LORAZEPAM 2 MG: 2 INJECTION INTRAMUSCULAR; INTRAVENOUS at 09:57

## 2021-03-07 RX ADMIN — HALOPERIDOL LACTATE 5 MG: 5 INJECTION, SOLUTION INTRAMUSCULAR at 09:57

## 2021-03-07 RX ADMIN — WATER 2 G: 1 INJECTION INTRAMUSCULAR; INTRAVENOUS; SUBCUTANEOUS at 22:49

## 2021-03-07 RX ADMIN — WATER 2 G: 1 INJECTION INTRAMUSCULAR; INTRAVENOUS; SUBCUTANEOUS at 14:04

## 2021-03-07 RX ADMIN — ACETAMINOPHEN 650 MG: 325 TABLET ORAL at 07:07

## 2021-03-07 RX ADMIN — Medication 10 ML: at 23:04

## 2021-03-07 NOTE — PROGRESS NOTES
2030- Pt stated to RN, \"did my family bring in my Xanax that they said they would bring? \" RN educated pt regarding the necessity of not bringing in outside medications/drugs in order to accurately treat and understand what is occurring with the pt. RN encouraged pt to disclose if/what medications were brought from home. 2200- RN found miscellaneous pill in pt's bed. RN collected pill, placed it in baggy, and placed it in locked drawer. RN identified pill as Suboxone. NP, charge RN, and nursing supervisors notified. 0730- RN notified dayshift RN, charge RN, and attending of overnight events. Verbal shift change report given to Yogesh Feldman RN  (oncoming nurse) by BELKIS Martines (offgoing nurse). Report included the following information SBAR, Kardex, ED Summary, Intake/Output, MAR, Accordion, Recent Results, Med Rec Status and Cardiac Rhythm ST       Patient Vitals for the past 12 hrs:   Temp Pulse Resp BP SpO2   03/07/21 0722 98.3 °F (36.8 °C) (!) 107 26 (!) 122/53 100 %   03/07/21 0436 99.1 °F (37.3 °C) 97 (!) 36 108/70    03/07/21 0055 100.1 °F (37.8 °C) (!) 115 25 112/74 98 %   .

## 2021-03-07 NOTE — PROGRESS NOTES
Patient in bed with eyes closed, respirations even and unlabored. Resting quietly. Sitter at bedside.

## 2021-03-07 NOTE — BH NOTES
PSYCHIATRY CONSULT NOTE:    Attempted to evaluate patient. Patient is deeply asleep and difficult to arouse so assessment could not be carried out. Please resend consult for tomorrow.

## 2021-03-07 NOTE — PROGRESS NOTES
belongings were locked in  room for safety with the exception of food items and chapstick. larger bag of items were locked in supply room with patient label. Sharps container removed from room and all unused sharps locked in room drawer for patient safety per nursing supervisor. patient asked me to hand her her box of cigarettes, stating there was a folded up piece of paper in the box that had her boyfriend's phone number on it, which she needed to dial using the phone in the room. due to safety concerns I did not hand her the box of cigarettes, but I removed the piece of paper to get the phone number for her and as I unfolded the paper to obtain the phone number for her, I noted a white, crystallized substance folded up in the paper. I refolded the paper with the substance inside. nursing supervisor and physician notified. white, crystallized substance disposed of in the rx destroyer bin. this action was witnessed by Sam Justin RN and Cami Benavides RN at 3384 3/7/21. There was no phone number noted on the paper. the paper and cigarettes were then returned and locked inside the lock  the patient room. I then noticed a somewhat dissolved, yet whole round, peach colored pill resting on her chest. this pill matched the pill found in her bed of the same condition the night before by Trena Garnica RN identified at [facility]. a third pill of the same size, shape, color, and condition, was found in patients wallet when her items were being locked up. All three pills were placed in the rx destroyer bin. this action was witnessed by Chey Cervantes RN at 1350. Nursing supervisor and physician notified.

## 2021-03-07 NOTE — PROGRESS NOTES
Patient c/o anxiety X2. PRN Ativan given to calm patient from cursing and getting out of bed. Patient agitated due to the one visitor per day policy. Patient educated on the importance of following the plan of care. Patient in bed at this time resting. No further issues at this time. Will continue to monitor at this time according to the plan of care.

## 2021-03-07 NOTE — PROGRESS NOTES
Patient upset and angry because s/o cannot visit. Threatening to leave, pull inflated hooker catheter out of meatus, and attempting to remove left quad IJ. 16fr hooker catheter removed. Patient immediately ambulated to bathroom with assistance and voided in commode. Patient given haldol 5mg IM in left deltoid and ativan 2mg IVP to decrease agitation and aggression. Mother at bedside and father on speaker phone trying to reason with patient about the risks of leaving the hospital without foregoing medical treatments.

## 2021-03-07 NOTE — PROGRESS NOTES
[facility]  Hospitalist Group                                                                                          Hospitalist Progress Note  Mando Morse MD  Answering service: [phone] from in house phone        Date of Service:  3/7/2021  NAME:  Quentin Pathak  :  1990  MRN:  [id number]      Please note that this dictation was completed with [employer], the computer voice recognition software. Quite often unanticipated grammatical, syntax, homophones, and other interpretive errors are inadvertently transcribed by the computer software. Please disregard these errors. Please excuse any errors that have escaped final proofreading. Admission Summary:   Thony Garcia is a 31 y.o.  female who presents with above symptoms. Started suddenly. Progressively worse. Patient with altered mental status not willing and unable to provide information. Mother reports she is adult but lives at her house. Not sure when she used any type of IV drugs last time. Possibly yesterday. Reports she has been dealing with IV drug abuse for many years. Apparently after an . Mother reports she has been multiple times in, due to narcotic overdose, reversed by nasal Narcan. She also has history of amphetamine use, unknown last use. Last 4 days unable to ambulate. She has been suffering from severe myalgia and fever occasional cough, negative for Covid no exposure. Mother reports she was negative as well. Patient and mother report, never had similar symptoms in the past.  She takes Suboxone and Xanax as needed for anxiety. Not have any other prescriptions.       Interval history / Subjective:   Patient seen and examined today. Labs imaging and chart reviewed.     Mental status remains poor today  Restless at times  Discussed with patient's mother  Pt trying to leave AMA this morning  TDO obtained, discussed with   Persistent complaints of pain Assessment & Plan:     Severe Sepsis due to staph aureus bacteremia POA, likely infectious endocarditis  Staph aureus high grade bacteremia, leukocytosis, elevated CRP and tachycardia  Troponins elevated as well but flat  IV drug abuser, per mother shot heroin a few days back  Started on vancomycin and Zosyn, changed to cefepime and Flagyl by ID  Now changed to cefazolin  Repeat blood cultures remained positive, now improving   CT chest shows a may be possibility of some aspiration  Respiratory PCR negative including SARS  Echocardiogram shows a posterior mitral valve vegetation and severe MR. CT surgery consulted, will continue to follow  MRI of the brain ordered  Prognosis remains poor due to persistent fever and bacteremia    Altered mental status, multifactorial  Encephalopathy from sepsis and withdrawal from drugs, heroin withdrawal and benzo withdrawal, and embolic events  CT of the head without any acute abnormality. MRI with Multiple punctate foci restricted diffusion and some associated T2  hyperintensity gray-white junction greater in the right hemisphere suggesting emboli. Seen by Neurology  Acute stroke, septic embolism   Prognosis is poor  Remains at risk for recurrent septic emboli and severe stroke  Stopped Lovenox due to risk of hemorrhagic conversion    Worsening anemia  Etiology unclear, likely multifactorial, watch for GI bleed  Recheck hgb stable  Stopped Lovenox  PUD prophylaxis  Transfuse prn    Possibility of her benzo withdrawal and heroin withdrawal and/or amphetamine overdose  Started patient back on Xanax and her Suboxone  Symptomatic treatment needed  IV Ativan as needed. Mental status remains tenuous    Hypokalemia  IV fluids with potassium  Resolved    Thrombocytopenia  Due to sepsis, improved, monitor    Diarrhea. C difficile negative  CT abdomen unremarkable  Monitor    Hypernatremia and hypokalemia  Due to free water loss from a diarrhea.  Improving  Body mass index is 20.01 kg/m². History of hepatitis C. Code status: FULL  DVT prophylaxis: scd     Care Plan discussed with: Patient/Family  Anticipated Disposition: Home w/Family  Anticipated Discharge: Greater than 48 hours     Hospital Problems  Never Reviewed          Codes Class Noted POA    Cerebrovascular accident (CVA) due to embolism of precerebral artery ([facility] [address] ICD-10-CM: I63.10  ICD-9-CM: 434.11  3/6/2021 Unknown        Sepsis ([facility] [address] ICD-10-CM: A41.9  ICD-9-CM: 038.9, 995.91  2/28/2021 Unknown              Review of Systems:   A comprehensive review of systems was negative except for that written in the HPI. Vital Signs:    Last 24hrs VS reviewed since prior progress note. Most recent are:  Visit Vitals  /78 (BP 1 Location: Right upper arm, BP Patient Position: At rest)   Pulse (!) 104   Temp 100.3 °F (37.9 °C)   Resp 23   Ht 5' 6\" (1.676 m)   Wt 56.2 kg (124 lb)   SpO2 96%   BMI 20.01 kg/m²         Intake/Output Summary (Last 24 hours) at 3/7/2021 1358  Last data filed at 3/7/2021 7419  Gross per 24 hour   Intake    Output 2400 ml   Net -2400 ml        Physical Examination:     I had a face to face encounter with this patient and independently examined them on 03/07/21 as outlined below:        Constitutional:  awake and talking today    ENT:  Oral mucosa moist, oropharynx benign. Resp:  CTA bilaterally. No wheezing/rhonchi/rales. No accessory muscle use   CV:  Regular rhythm, normal rate, no murmurs, gallops, rubs    GI:  Soft, non distended, non tender.  normoactive bowel sounds, no hepatosplenomegaly     Musculoskeletal:  Moving around in the bed    Neurologic: Alert and oriented             Data Review:    Review and/or order of clinical lab test      Labs:     Recent Labs     03/07/21 0442 03/06/21 0327   WBC 10.9 11.3*   HGB 7.1* 6.7*   HCT 21.5* 20.5*    221     Recent Labs     03/07/21 0442 03/06/21 0327 03/05/21  0513    141 144   K 3.5 3.8 3.5   * 111* 114*   CO2 25 24 24 BUN 6 5* 7   CREA 0.44* 0.43* 0.52*    132* 115*   CA 8.5 7.8* 7.3*   MG 2.1  --   --      Recent Labs     03/07/21  0442   ALT 18   AP 90   TBILI 0.4   TP 6.2*   ALB 1.8*   GLOB 4.4*     No results for input(s): INR, PTP, APTT, INREXT, INREXT in the last 72 hours. No results for input(s): FE, TIBC, PSAT, FERR in the last 72 hours. No results found for: FOL, RBCF   No results for input(s): PH, PCO2, PO2 in the last 72 hours. No results for input(s): CPK, CKNDX, TROIQ in the last 72 hours.     No lab exists for component: CPKMB  No results found for: CHOL, CHOLX, CHLST, CHOLV, HDL, HDLP, LDL, LDLC, DLDLP, TGLX, TRIGL, TRIGP, CHHD, CHHDX  Lab Results   Component Value Date/Time    Glucose (POC) 132 (H) 03/03/2021 05:27 AM    Glucose (POC) 118 (H) 03/03/2021 12:18 AM     Lab Results   Component Value Date/Time    Color DARK YELLOW 02/28/2021 06:12 PM    Appearance CLEAR 02/28/2021 06:12 PM    Specific gravity 1.020 02/28/2021 06:12 PM    Specific gravity 1.016 02/28/2021 06:00 AM    pH (UA) 6.5 02/28/2021 06:12 PM    Protein 100 (A) 02/28/2021 06:12 PM    Glucose Negative 02/28/2021 06:12 PM    Ketone 15 (A) 02/28/2021 06:12 PM    Bilirubin Negative 02/28/2021 06:00 AM    Urobilinogen 2.0 (H) 02/28/2021 06:12 PM    Nitrites Positive (A) 02/28/2021 06:12 PM    Leukocyte Esterase MODERATE (A) 02/28/2021 06:12 PM    Epithelial cells FEW 02/28/2021 06:12 PM    Bacteria 1+ (A) 02/28/2021 06:12 PM    WBC 20-50 02/28/2021 06:12 PM    RBC >100 (H) 02/28/2021 06:12 PM         Medications Reviewed:     Current Facility-Administered Medications   Medication Dose Route Frequency    haloperidol lactate (HALDOL) injection 5 mg  5 mg IntraMUSCular Q4H PRN    famotidine (PEPCID) tablet 20 mg  20 mg Oral BID    multivitamin, tx-iron-ca-min (THERA-M w/ IRON) tablet 1 Tab  1 Tab Oral DAILY    ceFAZolin (ANCEF) 2 g in sterile water (preservative free) 20 mL IV syringe  2 g IntraVENous Q8H    melatonin tablet 3 mg  3 mg Oral QHS PRN    nicotine (NICODERM CQ) 21 mg/24 hr patch 1 Patch  1 Patch TransDERmal DAILY    LORazepam (ATIVAN) injection 4 mg  4 mg IntraVENous Q4H PRN    LORazepam (ATIVAN) injection 2 mg  2 mg IntraVENous Q2H PRN    influenza vaccine 2020-21 (6 mos+)(PF) (FLUARIX/FLULAVAL/FLUZONE QUAD) injection 0.5 mL  0.5 mL IntraMUSCular PRIOR TO DISCHARGE    buprenorphine-naloxone (SUBOXONE) 8-2mg SL tablet  1 Tab SubLINGual BID    sodium chloride (NS) flush 5-40 mL  5-40 mL IntraVENous Q8H    sodium chloride (NS) flush 5-40 mL  5-40 mL IntraVENous PRN    acetaminophen (TYLENOL) tablet 650 mg  650 mg Oral Q6H PRN    Or    acetaminophen (TYLENOL) suppository 650 mg  650 mg Rectal Q6H PRN    polyethylene glycol (MIRALAX) packet 17 g  17 g Oral DAILY PRN    promethazine (PHENERGAN) tablet 12.5 mg  12.5 mg Oral Q6H PRN    Or    ondansetron (ZOFRAN) injection 4 mg  4 mg IntraVENous Q6H PRN     ______________________________________________________________________  EXPECTED LENGTH OF STAY: 4d 19h  ACTUAL LENGTH OF STAY:          7                 Wallace Aviles MD done

## 2021-03-08 LAB
CHOLEST SERPL-MCNC: 74 MG/DL
EST. AVERAGE GLUCOSE BLD GHB EST-MCNC: 103 MG/DL
HBA1C MFR BLD: 5.2 % (ref 4–5.6)
HDLC SERPL-MCNC: 21 MG/DL
HDLC SERPL: 3.5 {RATIO} (ref 0–5)
HIV 1+2 AB+HIV1 P24 AG SERPL QL IA: NONREACTIVE
HIV12 RESULT COMMENT, HHIVC: NORMAL
LDLC SERPL CALC-MCNC: 38.6 MG/DL (ref 0–100)
LIPID PROFILE,FLP: NORMAL
TRIGL SERPL-MCNC: 72 MG/DL (ref ?–150)
VLDLC SERPL CALC-MCNC: 14.4 MG/DL

## 2021-03-08 PROCEDURE — 74011250637 HC RX REV CODE- 250/637: Performed by: NURSE PRACTITIONER

## 2021-03-08 PROCEDURE — 74011250636 HC RX REV CODE- 250/636: Performed by: INTERNAL MEDICINE

## 2021-03-08 PROCEDURE — 74011000250 HC RX REV CODE- 250: Performed by: INTERNAL MEDICINE

## 2021-03-08 PROCEDURE — 74011250637 HC RX REV CODE- 250/637: Performed by: INTERNAL MEDICINE

## 2021-03-08 PROCEDURE — 36415 COLL VENOUS BLD VENIPUNCTURE: CPT

## 2021-03-08 PROCEDURE — 74011250637 HC RX REV CODE- 250/637: Performed by: FAMILY MEDICINE

## 2021-03-08 PROCEDURE — 99233 SBSQ HOSP IP/OBS HIGH 50: CPT | Performed by: NURSE PRACTITIONER

## 2021-03-08 PROCEDURE — 65660000001 HC RM ICU INTERMED STEPDOWN

## 2021-03-08 PROCEDURE — 99232 SBSQ HOSP IP/OBS MODERATE 35: CPT | Performed by: INTERNAL MEDICINE

## 2021-03-08 PROCEDURE — 87536 HIV-1 QUANT&REVRSE TRNSCRPJ: CPT

## 2021-03-08 PROCEDURE — 87389 HIV-1 AG W/HIV-1&-2 AB AG IA: CPT

## 2021-03-08 RX ORDER — HALOPERIDOL 5 MG/ML
2.5 INJECTION INTRAMUSCULAR ONCE
Status: DISCONTINUED | OUTPATIENT
Start: 2021-03-08 | End: 2021-03-08

## 2021-03-08 RX ADMIN — LORAZEPAM 4 MG: 2 INJECTION INTRAMUSCULAR; INTRAVENOUS at 05:07

## 2021-03-08 RX ADMIN — Medication 10 ML: at 21:22

## 2021-03-08 RX ADMIN — Medication 3 MG: at 21:22

## 2021-03-08 RX ADMIN — WATER 2 G: 1 INJECTION INTRAMUSCULAR; INTRAVENOUS; SUBCUTANEOUS at 06:00

## 2021-03-08 RX ADMIN — ACETAMINOPHEN 650 MG: 325 TABLET ORAL at 21:21

## 2021-03-08 RX ADMIN — BUPRENORPHINE HYDROCHLORIDE AND NALOXONE HYDROCHLORIDE DIHYDRATE 1 TABLET: 8; 2 TABLET SUBLINGUAL at 09:13

## 2021-03-08 RX ADMIN — ACETAMINOPHEN 650 MG: 325 TABLET ORAL at 09:13

## 2021-03-08 RX ADMIN — ACETAMINOPHEN 650 MG: 325 TABLET ORAL at 15:30

## 2021-03-08 RX ADMIN — WATER 2 G: 1 INJECTION INTRAMUSCULAR; INTRAVENOUS; SUBCUTANEOUS at 14:15

## 2021-03-08 RX ADMIN — Medication 10 ML: at 14:15

## 2021-03-08 RX ADMIN — FAMOTIDINE 20 MG: 20 TABLET, FILM COATED ORAL at 09:13

## 2021-03-08 RX ADMIN — MULTIPLE VITAMINS W/ MINERALS TAB 1 TABLET: TAB at 09:13

## 2021-03-08 RX ADMIN — LORAZEPAM 2 MG: 2 INJECTION INTRAMUSCULAR; INTRAVENOUS at 15:30

## 2021-03-08 RX ADMIN — BUPRENORPHINE HYDROCHLORIDE AND NALOXONE HYDROCHLORIDE DIHYDRATE 1 TABLET: 8; 2 TABLET SUBLINGUAL at 18:24

## 2021-03-08 RX ADMIN — LORAZEPAM 2 MG: 2 INJECTION INTRAMUSCULAR; INTRAVENOUS at 09:13

## 2021-03-08 RX ADMIN — Medication 10 ML: at 06:00

## 2021-03-08 RX ADMIN — WATER 2 G: 1 INJECTION INTRAMUSCULAR; INTRAVENOUS; SUBCUTANEOUS at 21:21

## 2021-03-08 RX ADMIN — FAMOTIDINE 20 MG: 20 TABLET, FILM COATED ORAL at 18:24

## 2021-03-08 RX ADMIN — HALOPERIDOL LACTATE 5 MG: 5 INJECTION, SOLUTION INTRAMUSCULAR at 18:24

## 2021-03-08 NOTE — PROGRESS NOTES
Neurology Follow-up  Hannah Whiteside NP      Visit Date: March 8, 2021    Patient: Mayank Jimenez MRN: [id number]  SSN: [id number]    YOB: 1990  Age: 31 y.o. Sex: female      PCP: Unknown, Provider      Previous records (physician notes, laboratory reports, and radiology reports) and imaging studies were reviewed and summarized. Subjective:     HPI: Mayank Jimenez is a 31 y.o. female with Schizoaffective disorder,  anxiety, hepatitis C and IV drug use (heroin) presented to the ED on 2/28/21with altered mental status, fatigue with generalized pain, somnolence, trouble breathing, chest pain and fevers as high as 101 Fahrenheit. She is admitted fever/cough with respiratory panel +Rhinovirus/Enterovirus and +MSSA bacteremia. MRI on 3/4 with multiple punctate foci in the right hemisphere suggestive of embolic strokes. TTE completed this admission concerning for vegetation of the posterior MV c/w endocarditis. She denies any focal deficits including weakness, numbness, speech or vision loss today. She is complaining of diffuse body pain.          Interval 03/08/21:     Pt lucid, oriented, no obvious deficits. Tearful about situation but physically feels better. Still c/o pain        Review of systems  Review of systems negative except as detailed in the HPI, interval, PMH and A&P      Past Medical History:   Diagnosis Date    Aggressive outburst     Anxiety disorder     Hepatitis C     IV drug abuse ([facility] [address]      History reviewed. No pertinent surgical history. History reviewed. No pertinent family history.   Social History     Tobacco Use    Smoking status: Former Smoker     Types: Cigarettes    Smokeless tobacco: Never Used    Tobacco comment: pt refused   Substance Use Topics    Alcohol use: Not Currently      Current Facility-Administered Medications   Medication Dose Route Frequency Provider Last Rate Last Admin    haloperidol lactate (HALDOL) injection 5 mg 5 mg IntraMUSCular Q4H PRN Dara Barakat MD   5 mg at 03/07/21 1550    famotidine (PEPCID) tablet 20 mg  20 mg Oral BID Dara Barakat MD   Stopped at 03/07/21 0900    multivitamin, tx-iron-ca-min (THERA-M w/ IRON) tablet 1 Tab  1 Tab Oral DAILY Cheryle Huggins MD   Stopped at 03/07/21 0900    ceFAZolin (ANCEF) 2 g in sterile water (preservative free) 20 mL IV syringe  2 g IntraVENous Q8H GomaRylee miranda R, DO   2 g at 03/08/21 0600    melatonin tablet 3 mg  3 mg Oral QHS PRN Emanuel Palacios NP   3 mg at 03/07/21 2337    nicotine (NICODERM CQ) 21 mg/24 hr patch 1 Patch  1 Patch TransDERmal DAILY Emanuel Palacios NP   1 Patch at 03/07/21 1428    LORazepam (ATIVAN) injection 4 mg  4 mg IntraVENous Q4H PRN Cheryle Huggins MD   4 mg at 03/08/21 0507    LORazepam (ATIVAN) injection 2 mg  2 mg IntraVENous Q2H PRN Cheryle Huggins MD   2 mg at 03/07/21 2249    influenza vaccine 2020-21 (6 mos+)(PF) (FLUARIX/FLULAVAL/FLUZONE QUAD) injection 0.5 mL  0.5 mL IntraMUSCular PRIOR TO DISCHARGE Cheryle Huggins MD        buprenorphine-naloxone (SUBOXONE) 8-2mg SL tablet  1 Tab SubLINGual BID Cheryle Huggins MD   Stopped at 03/07/21 0900    sodium chloride (NS) flush 5-40 mL  5-40 mL IntraVENous Q8H Lenny Bhandari MD   10 mL at 03/08/21 0600    sodium chloride (NS) flush 5-40 mL  5-40 mL IntraVENous PRN Lenny Bhandari MD        acetaminophen (TYLENOL) tablet 650 mg  650 mg Oral Q6H PRN Lenny Bhandari MD   650 mg at 03/07/21 2307    Or    acetaminophen (TYLENOL) suppository 650 mg  650 mg Rectal Q6H PRN Lenny Bhandari MD   650 mg at 03/03/21 0531    polyethylene glycol (MIRALAX) packet 17 g  17 g Oral DAILY PRN Lenny Bhandari MD        promethazine (PHENERGAN) tablet 12.5 mg  12.5 mg Oral Q6H PRN Lenny Bhandari MD        Or    ondansetron [facility]) injection 4 mg  4 mg IntraVENous Q6H PRN Lenny Bhandari MD            No Known Allergies       Objective:      Visit Vitals  /73 (BP 1 Location: Right lower arm, BP Patient Position: At rest)   Pulse 99   Temp 97.7 °F (36.5 °C)   Resp 22   Ht 5' 6\" (1.676 m)   Wt 123 lb 3.8 oz (55.9 kg)   SpO2 97%   BMI 19.89 kg/m²        General: No distress. Heart: Regular rate and rhythm. Lungs: Unlabored  Musculoskeletal: Extremities w/o edema or muscle wasting   Skin: Warm dry skin    Neurologic Exam:  Mental Status:  Alert and oriented to person place, time and situation  Language:    Normal fluency and comprehension    Cranial Nerves:   Pupils equal, round and reactive to light. Visual fields intact. Extraocular movements intact w/o nystagmus      Facial sensation intact to LT. Facial activation symmetric. Hearing intact bilaterally. No dysarthria. Shoulder shrug 5/5 bilaterally. Motor:    Bulk and tone normal.      Antigravity in all extremities.      No involuntary movements    Sensation:    Sensation intact to light touch    Coordination & Gait: deferred      Lab Results   Component Value Date/Time    WBC 10.9 03/07/2021 04:42 AM    HCT 21.5 (L) 03/07/2021 04:42 AM    HGB 7.1 (L) 03/07/2021 04:42 AM    PLATELET 481 60/36/7865 04:42 AM       Lab Results   Component Value Date/Time    Sodium 142 03/07/2021 04:42 AM    Potassium 3.5 03/07/2021 04:42 AM    Chloride 111 (H) 03/07/2021 04:42 AM    CO2 25 03/07/2021 04:42 AM    Glucose 100 03/07/2021 04:42 AM    BUN 6 03/07/2021 04:42 AM    Creatinine 0.44 (L) 03/07/2021 04:42 AM    Calcium 8.5 03/07/2021 04:42 AM       No components found for: TROPQUANT,  SGOT,  GPT,  ALT,  AP,  TBIL,  TBILI,  TP,  ALB,  GLOB,  GGT,  AML,  AMYP,  LPSE,  HLPSE    No results found for: LUTHER    No results found for: HBA1C, HGBE8, ZJZ0MUXG, WKT0YWTY     No results found for: B12LT, FOL, RBCF    No results found for: LUTHER, ANARX, ANAIGG, XBANA    No results found for: CHOL, CHOLPOCT, CHOLX, CHLST, CHOLV, HDL, HDLPOC, HDLP, LDL, LDLCPOC, LDLC, DLDLP, VLDLC, VLDL, TGLX, TRIGL, TRIGP, TGLPOCT, CHHD, CHHDX    XR Results   Results from Hospital Encounter encounter on 02/28/21   XR CHEST PORT    Impression 1. A right IJ approach catheter projects to terminate in the distal SVC. 2. Interstitial prominence, nonspecific which may represent pulmonary vascular  congestion. CT Results:  Results from Hospital Encounter encounter on 02/28/21   CT ABD PELV W CONT    Narrative EXAM: CT ABD PELV W CONT    INDICATION: diarrhea and severe sepsis    COMPARISON: No comparisons     CONTRAST: 100 mL of Isovue-370. TECHNIQUE:   Following the uneventful intravenous administration of contrast, thin axial  images were obtained through the abdomen and pelvis. Coronal and sagittal  reconstructions were generated. Oral contrast was not administered. CT dose  reduction was achieved through use of a standardized protocol tailored for this  examination and automatic exposure control for dose modulation. FINDINGS: Motion artifact limits evaluation  LOWER THORAX: Left lower lobe airspace disease with a small left pleural  effusion. LIVER: No mass. BILIARY TREE: Gallbladder is within normal limits. CBD is not dilated. SPLEEN: Borderline enlarged spleen  PANCREAS: No mass or ductal dilatation. ADRENALS: Unremarkable. KIDNEYS: No mass, calculus, or hydronephrosis. STOMACH: Unremarkable. SMALL BOWEL: No dilatation or wall thickening. COLON: No dilatation or wall thickening. APPENDIX: Unremarkable  PERITONEUM: Free fluid in the pelvis  RETROPERITONEUM: No lymphadenopathy or aortic aneurysm. REPRODUCTIVE ORGANS: Unremarkable  URINARY BLADDER: Distended urinary bladder  BONES: No destructive bone lesion. ABDOMINAL WALL: No mass or hernia. ADDITIONAL COMMENTS: N/A      Impression 1. Limited by motion. 2.  Left lower lobe airspace disease and left effusion suspicious for pneumonia. 3.   Distended urinary bladder. 4.  Free fluid in the pelvis.  No evidence of bowel obstruction or free  intraperitoneal air   CTA CHEST W OR W WO CONT    Narrative EXAM: CTA CHEST W OR W WO CONT    INDICATION: eval for septic emboli    COMPARISON: None. CONTRAST: 80 mL of Isovue-370. TECHNIQUE:   Precontrast  images were obtained to localize the volume for acquisition. Multislice helical CT arteriography was performed from the diaphragm to the  thoracic inlet during uneventful rapid bolus intravenous contrast  administration. Lung and soft tissue windows were generated. Coronal and  sagittal images were generated and 3D post processing consisting of coronal  maximum intensity images was performed. CT dose reduction was achieved through  use of a standardized protocol tailored for this examination and automatic  exposure control for dose modulation. FINDINGS:  The lungs are clear of mass, nodule, airspace disease or edema. There is  opacification of peripheral lower lobe airways bilaterally. The pulmonary arteries are well enhanced and no pulmonary emboli are identified. There is no mediastinal or hilar adenopathy or mass. The aorta enhances normally  without evidence of aneurysm or dissection. The visualized portions of the upper abdominal organs are normal.      Impression No pulmonary process or septic emboli. Lower lobe branch bronchial  opacification likely reflecting  secretions or aspiration. Results from [place] encounter on 02/28/21   CT HEAD WO CONT    Narrative Axial images from the skull base to the vertex were obtained without the use of  contrast. Bone windows were reviewed. Sagittal and coronal reformatted images  were also reviewed. All CT scans at this facility are performed using dose  reduction optimization techniques as appropriate to a performed exam including  the following:   automated exposure control, adjustments of the mA and/or kV  according to patient size, or use of iterative reconstruction technique. INDICATION: Headache. There is no evidence of hemorrhage. No mass or mass effect is seen.  There is no  acute ischemia. Ventricles, sulci and cisterns are intact. Normal gray matter/white matter  differentiation. Bone windows show no acute abnormalities. The visualized portions of the orbits,  paranasal sinuses and mastoid air cells are unremarkable. Review of the  reconstructed images show no additional findings. Impression Negative CT of the brain. MRI Results:  Results from [place] encounter on 02/28/21   MRI BRAIN WO CONT    Narrative EXAM:  MRI BRAIN WO CONT  INDICATION:  septic emboli,  TECHNIQUE:  Sagittal T1, axial FLAIR, T2,T1 and gradient echo images as well as coronal T2  weighted images and axial diffusion weighted images of the head were obtained. COMPARISON:  CT head 10/28/21  FINDINGS:  There are multiple foci of T2 hyperintensity in the cerebral white matter  predominantly periventricular and some possibly pericallosal in a nonspecific  pattern. There is no definitive associated restricted diffusion in these foci  although there is suggested minimal T2 shine through. There are several punctate foci of restricted diffusion in the right greater  than left posterior frontal and parietal lobes near the gray-white junction. This is in a pattern which would be consistent with emboli in this patient with  known cardiac valve vegetation. A few tiny punctate foci of hypointensity  possible on gradient echo. SWI was not performed. Ventricular size and  configuration are normal. No definite acute intracranial hemorrhage, mass or  abnormal extra-axial fluid collections. Flow voids are present in vertebral  basilar and carotid artery systems. Mild mucosal thickening paranasal sinuses. Impression 1. Multiple punctate foci restricted diffusion and some associated T2  hyperintensity gray-white junction greater in the right hemisphere suggesting  emboli. 2. Other multifocal white matter T2 hyperintensity may be more chronic and of  uncertain etiology.        VAS/US Results (maximum last 3): No results found for this or any previous visit. TTE  02/28/21   ECHO ADULT COMPLETE 03/03/2021 3/3/2021    Narrative · LV: Calculated LVEF is 71%. Normal cavity size, wall thickness, systolic   function (ejection fraction normal) and diastolic function. Wall motion:   normal. Normal left ventricular strain. · Image quality for this study was suboptimal.  · MV: Mitral valve non-specific thickening. Mild mitral valve prolapse of   the posterior leaflet. Severe mitral valve regurgitation is present. Possible vegetation present on the posterior leaflet of the mitral valve. Posterior mitral leaflet vegetation is moderately sized. Vegetation is   localized on the atrial aspect of the posterior mitral leaflet. Signed by: Marlen Denise MD         EKG  Results for orders placed or performed during the hospital encounter of 02/28/21   EKG, 12 LEAD, INITIAL   Result Value Ref Range    Ventricular Rate 130 BPM    Atrial Rate 130 BPM    P-R Interval 138 ms    QRS Duration 92 ms    Q-T Interval 322 ms    QTC Calculation (Bezet) 473 ms    Calculated P Axis 75 degrees    Calculated R Axis 91 degrees    Calculated T Axis 53 degrees    Diagnosis       Sinus tachycardia  QT prolongation  Poor R-wave Progression  No previous ECGs available  Confirmed by Johnson Alonso MD, Gris Dillon ([id number]) on 3/1/2021 7:06:26 PM           Assessment/Plan:     PTA antiplatelet: No  PTA AC: No  PTA statin: No  LDL: 38  ECHO: LVEF is 71%. Mitral valve non-specific thickening. Mild mitral valve prolapse with severe mitral valve regurgitation. Possible moderately sized vegetation on the posterior mitral leaflet. EKG: ST  A1C: 5.2      Abril Epstein is a 31 y.o. female w/ h/o IV substance abuse, HCV admitted with +Rhinovirus/Enterovirus and +MSSA bacteremia. On abx. MRI Brain with multiple punctate strokes involving the right hemisphere c/w embolic mechanism. TTE reveals possible vegetation of the posterior MV c/w endocarditis.    KELLY pending. Aspirin just for septic emboli not indicated and can increase risk for hemorrhage. Treat underlying infection  Management of mitral valve vegetation per cards  LDL within goal   A1C within goal  SBP goal <140/<80  PT/OT.      We will sign-off  Please call us with questions    Thank you for this consult    Signed By: Vickey Whitehead NP     March 8, 2021 8:45 AM

## 2021-03-08 NOTE — PROGRESS NOTES
BCx from 3/5 also growing GPC. Please send repeat blood cultures today (ordered). Dr. Rozina Walker will resume care from 3/8/21.        Adolfo Caceres MD  Infectious Diseases

## 2021-03-08 NOTE — PROGRESS NOTES
[address] [facility]                                                                                          Hospitalist Progress Note  Ashtyn Arellano MD  Answering service: [phone] from in house phone        Date of Service:  3/8/2021  NAME:  Dominga Wade  :  1990  MRN:  [id number]      Please note that this dictation was completed with Xsigo, the computer voice recognition software. Quite often unanticipated grammatical, syntax, homophones, and other interpretive errors are inadvertently transcribed by the computer software. Please disregard these errors. Please excuse any errors that have escaped final proofreading. Admission Summary:   Joaquin Starr is a 31 y.o.  female who presents with above symptoms. Started suddenly. Progressively worse. Patient with altered mental status not willing and unable to provide information. Mother reports she is adult but lives at her house. Not sure when she used any type of IV drugs last time. Possibly yesterday. Reports she has been dealing with IV drug abuse for many years. Apparently after an . Mother reports she has been multiple times in, due to narcotic overdose, reversed by nasal Narcan. She also has history of amphetamine use, unknown last use. Last 4 days unable to ambulate. She has been suffering from severe myalgia and fever occasional cough, negative for Covid no exposure. Mother reports she was negative as well. Patient and mother report, never had similar symptoms in the past.  She takes Suboxone and Xanax as needed for anxiety. Not have any other prescriptions.       Interval history / Subjective:   Patient seen and examined today. Labs imaging and chart reviewed.   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 CT surgery consulted, will continue to follow  MRI of the brain-  Multiple punctate foci restricted diffusion and some associated T2  hyperintensity gray-white junction greater in the right hemisphere suggesting  Emboli. Other multifocal white matter T2 hyperintensity may be more chronic and of  uncertain etiology. Prognosis remains poor due to persistent fever and bacteremia    Altered mental status, multifactorial  Encephalopathy from sepsis and withdrawal from drugs, heroin withdrawal and benzo withdrawal, and embolic events  CT of the head without any acute abnormality. MRI with Multiple punctate foci restricted diffusion and some associated T2  hyperintensity gray-white junction greater in the right hemisphere suggesting emboli. Seen by Neurology  Acute stroke, septic embolism     Prognosis is poor  Remains at risk for recurrent septic emboli and severe stroke  Stopped Lovenox due to risk of hemorrhagic conversion    Worsening anemia  Etiology unclear, likely multifactorial, watch for GI bleed  Stopped Lovenox  PUD prophylaxis  Transfuse prn hgb 7.1    Possibility of her benzo withdrawal and heroin withdrawal and/or amphetamine overdose  Started patient back on Xanax and her Suboxone  Symptomatic treatment needed  IV Ativan as needed.    Mental status remains tenuous    Hypokalemia  IV fluids with potassium  Resolved    Thrombocytopenia  Due to sepsis, improved, monitor    Diarrhea. C difficile negative  CT abdomen unremarkable  Monitor    Hypernatremia and hypokalemia  Due to free water loss from a diarrhea. Improving  Body mass index is 19.89 kg/m². History of hepatitis C. Code status: FULL  DVT prophylaxis:SCD    Care Plan discussed with: Patient/Family  Anticipated Disposition: Home w/Family  Anticipated Discharge: Greater than 48 hours   Communicated to [employer] awaiting response for [facility]     Hospital Problems  Never Reviewed          Codes Class Noted POA    Cerebrovascular accident (CVA) due to embolism of precerebral artery ([facility] [address] ICD-10-CM: I63.10  ICD-9-CM: 434.11  3/6/2021 Unknown        Sepsis ([facility] [address] ICD-10-CM: A41.9  ICD-9-CM: 038.9, 995.91  2/28/2021 Unknown              Review of Systems:   A comprehensive review of systems was negative except for that written in the HPI. Vital Signs:    Last 24hrs VS reviewed since prior progress note. Most recent are:  Visit Vitals  /73 (BP 1 Location: Right lower arm, BP Patient Position: At rest)   Pulse 99   Temp 97.7 °F (36.5 °C)   Resp 22   Ht 5' 6\" (1.676 m)   Wt 55.9 kg (123 lb 3.8 oz)   SpO2 97%   BMI 19.89 kg/m²         Intake/Output Summary (Last 24 hours) at 3/8/2021 1357  Last data filed at 3/8/2021 0823  Gross per 24 hour   Intake 502 ml   Output    Net 502 ml        Physical Examination:     I had a face to face encounter with this patient and independently examined them on 03/08/21 as outlined below:        Constitutional:  awake and talking today    ENT:  Oral mucosa moist, oropharynx benign. Resp:  CTA bilaterally. No wheezing/rhonchi/rales. No accessory muscle use   CV:  Regular rhythm, normal rate, no murmurs, gallops, rubs    GI:  Soft, non distended, non tender.  normoactive bowel sounds, no hepatosplenomegaly     Musculoskeletal:  Moving around in the bed    Neurologic: Alert and oriented             Data Review:    Review and/or order of clinical lab test      Labs:     Recent Labs     03/07/21 0442 03/06/21  0327   WBC 10.9 11.3*   HGB 7.1* 6.7*   HCT 21.5* 20.5*    221     Recent Labs     03/07/21 0442 03/06/21 0327    141   K 3.5 3.8   * 111*   CO2 25 24   BUN 6 5*   CREA 0.44* 0.43*    132*   CA 8.5 7.8*   MG 2.1  --      Recent Labs     03/07/21 0442   ALT 18   AP 90   TBILI 0.4   TP 6.2*   ALB 1.8*   GLOB 4.4*     No results for input(s): INR, PTP, APTT, INREXT, INREXT in the last 72 hours. No results for input(s): FE, TIBC, PSAT, FERR in the last 72 hours. No results found for: FOL, RBCF   No results for input(s): PH, PCO2, PO2 in the last 72 hours. No results for input(s): CPK, CKNDX, TROIQ in the last 72 hours.     No lab exists for component: CPKMB  Lab Results   Component Value Date/Time    Cholesterol, total 74 03/07/2021 04:42 AM    HDL Cholesterol 21 03/07/2021 04:42 AM    LDL, calculated 38.6 03/07/2021 04:42 AM    Triglyceride 72 03/07/2021 04:42 AM    CHOL/HDL Ratio 3.5 03/07/2021 04:42 AM     Lab Results   Component Value Date/Time    Glucose (POC) 132 (H) 03/03/2021 05:27 AM    Glucose (POC) 118 (H) 03/03/2021 12:18 AM     Lab Results   Component Value Date/Time    Color DARK YELLOW 02/28/2021 06:12 PM    Appearance CLEAR 02/28/2021 06:12 PM    Specific gravity 1.020 02/28/2021 06:12 PM    Specific gravity 1.016 02/28/2021 06:00 AM    pH (UA) 6.5 02/28/2021 06:12 PM    Protein 100 (A) 02/28/2021 06:12 PM    Glucose Negative 02/28/2021 06:12 PM    Ketone 15 (A) 02/28/2021 06:12 PM    Bilirubin Negative 02/28/2021 06:00 AM    Urobilinogen 2.0 (H) 02/28/2021 06:12 PM    Nitrites Positive (A) 02/28/2021 06:12 PM    Leukocyte Esterase MODERATE (A) 02/28/2021 06:12 PM    Epithelial cells FEW 02/28/2021 06:12 PM    Bacteria 1+ (A) 02/28/2021 06:12 PM    WBC 20-50 02/28/2021 06:12 PM    RBC >100 (H) 02/28/2021 06:12 PM Medications Reviewed:     Current Facility-Administered Medications   Medication Dose Route Frequency    haloperidol lactate (HALDOL) injection 5 mg  5 mg IntraMUSCular Q4H PRN    famotidine (PEPCID) tablet 20 mg  20 mg Oral BID    multivitamin, tx-iron-ca-min (THERA-M w/ IRON) tablet 1 Tab  1 Tab Oral DAILY    ceFAZolin (ANCEF) 2 g in sterile water (preservative free) 20 mL IV syringe  2 g IntraVENous Q8H    melatonin tablet 3 mg  3 mg Oral QHS PRN    nicotine (NICODERM CQ) 21 mg/24 hr patch 1 Patch  1 Patch TransDERmal DAILY    LORazepam (ATIVAN) injection 4 mg  4 mg IntraVENous Q4H PRN    LORazepam (ATIVAN) injection 2 mg  2 mg IntraVENous Q2H PRN    influenza vaccine 2020-21 (6 mos+)(PF) (FLUARIX/FLULAVAL/FLUZONE QUAD) injection 0.5 mL  0.5 mL IntraMUSCular PRIOR TO DISCHARGE    buprenorphine-naloxone (SUBOXONE) 8-2mg SL tablet  1 Tab SubLINGual BID    sodium chloride (NS) flush 5-40 mL  5-40 mL IntraVENous Q8H    sodium chloride (NS) flush 5-40 mL  5-40 mL IntraVENous PRN    acetaminophen (TYLENOL) tablet 650 mg  650 mg Oral Q6H PRN    Or    acetaminophen (TYLENOL) suppository 650 mg  650 mg Rectal Q6H PRN    polyethylene glycol (MIRALAX) packet 17 g  17 g Oral DAILY PRN    promethazine (PHENERGAN) tablet 12.5 mg  12.5 mg Oral Q6H PRN    Or    ondansetron (ZOFRAN) injection 4 mg  4 mg IntraVENous Q6H PRN     ______________________________________________________________________  EXPECTED LENGTH OF STAY: 4d 19h  ACTUAL LENGTH OF STAY:          8                 Levi Hickman MD

## 2021-03-08 NOTE — PROGRESS NOTES
Infectious Disease Progress Note          HPI  Ms Yeimi Yanes seen  Says wants to walk around and go home for iV antibiotics   Denied chest pain, shortness of breath, nausea, vomiting  Denied neuro symptoms of headache, focal weakness             Physical Exam:    Vitals:   Patient Vitals for the past 24 hrs:   Temp Pulse Resp BP SpO2   03/08/21 0841 97.7 °F (36.5 °C) 99 22 117/73 97 %   03/08/21 0520 97.4 °F (36.3 °C) (!) 103 20 111/65 98 %   03/08/21 0038 98.1 °F (36.7 °C)       03/07/21 2301 (!) 101.1 °F (38.4 °C) (!) 111 20 128/74 97 %   03/07/21 1900 99.9 °F (37.7 °C) (!) 105 20 107/60 100 %   03/07/21 1647 100.1 °F (37.8 °C) (!) 127 20 116/68 96 %     · GEN NAD  · HEENT: no scleral icterus, no thrush , poor dentition+ RIJ  · CV: S1, S2 heard ,  · Lungs:  Coarse no wheezing  · Abdomen: soft, non distended, non tender   · Extremities: no edema noted   · Neuro:awake, alert, to self place, situation, no tremors, follows commands, move all extremities , ambulates in room to bathroom  · Skin: no rash  Seen   ·   Labs:   No results found for this or any previous visit (from the past 24 hour(s)).     Microbiology Data:       Blood: 2/28/21  Specimen Information: Blood        Component Value Flag Ref Range Units Status   Special Requests:      Final   No Special Requests    Culture result: Abnormal       Final   Staphylococcus aureus growing in all 4 bottles drawn    Culture result:      Final   Gram pos cocci in clusters   growing in all 4 bottles drawn   CALLED TO AND READ BACK BY   Yesy Torrez on 3.1.21 at 12:13 by [email or web address]    Susceptibility    Staphylococcus aureus    Antibiotic Interpretation Value Method Comment   Clindamycin ($) Resistant Resistant ug/mL ANGELIKA    Ciprofloxacin ($) Susceptible <=0.5 ug/mL ANGELIKA    Daptomycin ($$$$$) Susceptible 0.25 ug/mL ANGELIKA    Doxycycline ($$) Susceptible <=0.5 ug/mL ANGELIKA    Erythromycin ($$$$) Resistant >=8 ug/mL ANGELIKA    Gentamicin ($) Susceptible <=0.5 ug/mL ANGELIKA    Levofloxacin ($) Susceptible 0.25 ug/mL ANGELIKA    Linezolid ($$$$$) Susceptible 2 ug/mL ANGELIKA    Moxifloxacin ($$$$) Susceptible <=0.25 ug/mL ANGELIKA    Oxacillin Susceptible 0.5 ug/mL ANGELIKA    Rifampin ($$$$) Susceptible <=0.5 ug/mL ANGELIKA Rifampin is not to be used for mono-therapy. Tetracycline Susceptible <=1 ug/mL ANGELIKA    Trimeth/Sulfa Susceptible <=10 ug/mL ANGELIKA    Vancomycin ($) Susceptible 1 ug/mL ANGELIKA        Blood 3/4/21  Special Requests:      Preliminary   NO SPECIAL REQUESTS    Culture result: Abnormal       Preliminary   GRAM POSITIVE COCCI IN CLUSTERS GROWING IN 2 OF 4 BOTTLES DRAWN (SITE = L. AC AND R HAND)    Culture result:      Preliminary   REMAINING BOTTLE(S) HAS/HAVE NO GROWTH SO FAR      Blood 3/5/21  Specimen Information: Blood        Component Value Flag Ref Range Units Status   Special Requests:      Preliminary   NO SPECIAL REQUESTS    Culture result: Abnormal       Preliminary   GRAM POSITIVE COCCI IN CLUSTERS GROWING IN 1 OF 4 BOTTLES DRAWN . Miguelito Farley Ra LEFT ARM SITE    Culture result:      Preliminary   REMAINING BOTTLE(S) HAS/HAVE NO GROWTH SO FAR                 Urine:  Special Requests:      Final   NO SPECIAL REQUESTS    [place] Count >100,000   COLONIES/mL      Final   Culture result: Abnormal       Final   STAPHYLOCOCCUS AUREUS    Susceptibility    Staphylococcus aureus    Antibiotic Interpretation Value Method Comment   Ciprofloxacin ($) Susceptible <=0.5 ug/mL ANGELIKA    Gentamicin ($) Susceptible <=0.5 ug/mL ANGELIKA    Nitrofurantoin Susceptible <=16 ug/mL ANGELIKA    Oxacillin Susceptible 0.5 ug/mL ANGELIKA    Rifampin ($$$$) Susceptible <=0.5 ug/mL ANGELIKA Rifampin is not to be used for mono-therapy.    Tetracycline Susceptible <=1 ug/mL ANGELIKA    Trimeth/Sulfa Susceptible <=10 ug/mL ANGELIKA    Vancomycin ($) Susceptible 1 ug/mL ANGELIKA    Levofloxacin ($) Susceptible 0.5 ug/mL ANGELIKA    Linezolid ($$$$$) Susceptible 2 ug/mL ANGELIKA    Daptomycin ($$$$$) Susceptible 0.25 ug/mL ANGELIKA    Moxifloxacin ($$$$) Susceptible <=0.25 ug/mL ANGELIKA    Doxycycline ($$) Susceptible <=0.5 ug/mL ANGELIKA        Component Value Flag Ref Range Units Status   GDH ANTIGEN Negative   NEG   Final   C. difficile toxin Negative   NEG   Final   INTERPRETATION   NTXCD   Final   NEGATIVE FOR TOXIGENIC C. DIFFICILE                Imaging:   CT chest  FINDINGS:  The lungs are clear of mass, nodule, airspace disease or edema. There is  opacification of peripheral lower lobe airways bilaterally.     The pulmonary arteries are well enhanced and no pulmonary emboli are identified.     There is no mediastinal or hilar adenopathy or mass. The aorta enhances normally  without evidence of aneurysm or dissection.     The visualized portions of the upper abdominal organs are normal.     IMPRESSION  No pulmonary process or septic emboli. Lower lobe branch bronchial  opacification likely reflecting  secretions or aspiration. CT ABD  FINDINGS: Motion artifact limits evaluation  LOWER THORAX: Left lower lobe airspace disease with a small left pleural  effusion. LIVER: No mass. BILIARY TREE: Gallbladder is within normal limits. CBD is not dilated. SPLEEN: Borderline enlarged spleen  PANCREAS: No mass or ductal dilatation. ADRENALS: Unremarkable. KIDNEYS: No mass, calculus, or hydronephrosis. STOMACH: Unremarkable. SMALL BOWEL: No dilatation or wall thickening. COLON: No dilatation or wall thickening. APPENDIX: Unremarkable  PERITONEUM: Free fluid in the pelvis  RETROPERITONEUM: No lymphadenopathy or aortic aneurysm. REPRODUCTIVE ORGANS: Unremarkable  URINARY BLADDER: Distended urinary bladder  BONES: No destructive bone lesion. ABDOMINAL WALL: No mass or hernia. ADDITIONAL COMMENTS: N/A     IMPRESSION  1. Limited by motion.     2.  Left lower lobe airspace disease and left effusion suspicious for pneumonia.     3. Distended urinary bladder.     4. Free fluid in the pelvis. No evidence of bowel obstruction or free  intraperitoneal air    TTE  · LV: Calculated LVEF is 71%.  Normal cavity size, wall thickness, systolic function (ejection fraction normal) and diastolic function. Wall motion: normal. Normal left ventricular strain. · Image quality for this study was suboptimal.  · MV: Mitral valve non-specific thickening. Mild mitral valve prolapse of the posterior leaflet. Severe mitral valve regurgitation is present. Possible vegetation present on the posterior leaflet of the mitral valve. Posterior mitral leaflet vegetation is moderately sized. Vegetation is localized on the atrial aspect of the posterior mitral leaflet. Echo Findings    Left Ventricle Normal cavity size, wall thickness, systolic function (ejection fraction normal) and diastolic function. The muscle mass is normal. The cavity shape is normal. Wall motion: normal. The calculated EF is 71%. End-systolic volume is normal. Normal left ventricular strain. Normal left ventricular diastolic pressure. End-diastolic volume is normal.   Left Atrium Normal cavity size. Right Ventricle Normal cavity size, wall thickness and global systolic function. Right Atrium Normal cavity size. Aortic Valve Aortic valve not well visualized. No stenosis and no regurgitation. Mitral Valve Mitral valve not well visualized. No stenosis. Mitral valve non-specific thickening. Mild mitral valve prolapse of the posterior leaflet. Severe regurgitation. The mitral regurgitant jet is eccentric. There is possible vegetation on the posterior leaflet of the mitral valve. Posterior leaflet vegetation is moderately sized. The vegetation is localized on the atrial aspect of the posterior leaflet. Tricuspid Valve Normal valve structure, no stenosis and no regurgitation. Pulmonic Valve Normal valve structure, no stenosis and no regurgitation. Aorta Normal aortic root, ascending aortic, and aortic arch.    Pericardium Normal pericardium and no evidence of pericardial effusion       MRI     FINDINGS:  There are multiple foci of T2 hyperintensity in the cerebral white matter  predominantly periventricular and some possibly pericallosal in a nonspecific  pattern. There is no definitive associated restricted diffusion in these foci  although there is suggested minimal T2 shine through. There are several punctate foci of restricted diffusion in the right greater  than left posterior frontal and parietal lobes near the gray-white junction. This is in a pattern which would be consistent with emboli in this patient with  known cardiac valve vegetation. A few tiny punctate foci of hypointensity  possible on gradient echo. SWI was not performed. Ventricular size and  configuration are normal. No definite acute intracranial hemorrhage, mass or  abnormal extra-axial fluid collections. Flow voids are present in vertebral  basilar and carotid artery systems. Mild mucosal thickening paranasal sinuses.     IMPRESSION  1. Multiple punctate foci restricted diffusion and some associated T2  hyperintensity gray-white junction greater in the right hemisphere suggesting  emboli. 2. Other multifocal white matter T2 hyperintensity may be more chronic and of  uncertain etiology.         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 and concern for brain emboli on MRI   Hx of IVDU , urine drug screen positive for amphetamines and THC  sepsis   + Rhinovirus, + Enterovirus on resp panel    hypernatremia   elevated inflammatory markers   mild elevation bilirubin and AST   altered mental status, encephalopathy   history of overdose per chart  thrombocytopenia  chart reported history of hepatitis C   chart reported history of anxiety, aggressive outbursts, schizoaffective disorder        Plan:  Repeat blood cx 3/7 pending   Blood cx + from 2/28-3/5/21  So far   Renally dosed Cefazolin   KELLY --  being followed by CTS  MRI brain with \"  Multiple punctate foci restricted diffusion and some associated T2 hyperintensity gray-white junction greater in the right hemisphere suggesting emboli\" Repeat blood cx Q 24-48 hours pending clearance   Rhinovirus and Enterovirus detected on respiratory panel -- supportive care   Resources for addiction medicine per primary team    Hep C RNA not detected   Check HIV ( consent obtained )       Thank for the opportunity to participate in the care of this patient. Please contact with questions or concerns.            Rylee Garza DO  12:01 PM

## 2021-03-08 NOTE — PROGRESS NOTES
Transition Plan of Care  [facility]%-Med  Admitted from home with severe sepsis. Has a long history of IV drug use. At baseline she resides in her parents home. ID following possible endocarditis. Patient very agitated today. Wants to be able to walk around and to go home with IV antibiotics. Given history of IV drug use, home with IV antibiotics is not an option. She has pulled out her urine catheter and IVs. Currently on TDO. Awaiting Psychiatry consult.   Marjorie Estevez RN CRM  Ext [phone]

## 2021-03-08 NOTE — PROGRESS NOTES
[facility] FLOOR Progress Note    Admit Date: 2021      Subjective:   Pt seen with Dr. Ana Laura Bowers. Tmax 101.1, room air. Visiting with her mother. Would like to go home. Objective:     Visit Vitals  /73 (BP 1 Location: Right lower arm, BP Patient Position: At rest)   Pulse 99   Temp 97.7 °F (36.5 °C)   Resp 22   Ht 5' 6\" (1.676 m)   Wt 123 lb 3.8 oz (55.9 kg)   SpO2 97%   BMI 19.89 kg/m²     Temp (24hrs), Av.1 °F (37.3 °C), Min:97.4 °F (36.3 °C), Max:101.1 °F (38.4 °C)      Last 24hr Input/Output:    Intake/Output Summary (Last 24 hours) at 3/8/2021 1307  Last data filed at 3/8/2021 0520  Gross per 24 hour   Intake 502 ml   Output    Net 502 ml        EKG/Rhythm: Sinus tachycardia in the       Oxygen: room air    CXR:  CXR Results  (Last 48 hours)    None              Admission Weight: Last Weight   Weight: 130 lb (59 kg) Weight: 123 lb 3.8 oz (55.9 kg)       EXAM:  General: No acute distress   Lungs:   Clear to auscultation bilaterally. Heart:  Regular rate and rhythm, S1, S2 normal, no murmur, click, rub or gallop. Abdomen:   Soft, non-tender. Bowel sounds normal. No masses,  No organomegaly. Extremities:  No edema. PPP   Neurologic:  Gross motor and sensory apparatus intact. Lab Data Reviewed:   Recent Labs     21  0442   WBC 10.9   HGB 7.1*   HCT 21.5*         K 3.5   BUN 6   CREA 0.44*            Assessment:     Active Problems:    Sepsis ([address] (2021)      Cerebrovascular accident (CVA) due to embolism of precerebral artery ([facility] [address] (3/6/2021)             Plan/Recommendations/Medical Decision Makin. 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 Previous + MSSA. Continue IV antibiotics. Will continue to monitor. 2. MRI Brain suggesting emboli in the right hemisphere. 3. History of IV drug abuse and psychosis: UDA positive for Amphetamines and THC. Psychiatry has been asked to consult-pending. Other cares per primary and consulting teams    Discussed with Dr. Beth Gonzalez. Cardiac Surgery will sign off. Available if needed.       Signed By: Jessica Cleaning NP

## 2021-03-09 LAB
BACTERIA SPEC CULT: ABNORMAL
BACTERIA SPEC CULT: ABNORMAL
COMMENT, HOLDF: NORMAL
HIV1 RNA # SERPL NAA+PROBE: <20 COPIES/ML
HIV1 RNA SERPL NAA+PROBE-LOG#: NORMAL LOG10COPY/ML
SAMPLES BEING HELD,HOLD: NORMAL
SERVICE CMNT-IMP: ABNORMAL

## 2021-03-09 PROCEDURE — 74011250637 HC RX REV CODE- 250/637: Performed by: INTERNAL MEDICINE

## 2021-03-09 PROCEDURE — 74011250637 HC RX REV CODE- 250/637: Performed by: FAMILY MEDICINE

## 2021-03-09 PROCEDURE — 65660000001 HC RM ICU INTERMED STEPDOWN

## 2021-03-09 PROCEDURE — 74011000250 HC RX REV CODE- 250: Performed by: INTERNAL MEDICINE

## 2021-03-09 PROCEDURE — 74011250636 HC RX REV CODE- 250/636: Performed by: INTERNAL MEDICINE

## 2021-03-09 PROCEDURE — 99232 SBSQ HOSP IP/OBS MODERATE 35: CPT | Performed by: INTERNAL MEDICINE

## 2021-03-09 PROCEDURE — 74011250637 HC RX REV CODE- 250/637: Performed by: NURSE PRACTITIONER

## 2021-03-09 RX ADMIN — ACETAMINOPHEN 650 MG: 325 TABLET ORAL at 21:31

## 2021-03-09 RX ADMIN — WATER 2 G: 1 INJECTION INTRAMUSCULAR; INTRAVENOUS; SUBCUTANEOUS at 05:31

## 2021-03-09 RX ADMIN — LORAZEPAM 2 MG: 2 INJECTION INTRAMUSCULAR; INTRAVENOUS at 06:11

## 2021-03-09 RX ADMIN — Medication 10 ML: at 13:52

## 2021-03-09 RX ADMIN — BUPRENORPHINE HYDROCHLORIDE AND NALOXONE HYDROCHLORIDE DIHYDRATE 1 TABLET: 8; 2 TABLET SUBLINGUAL at 09:52

## 2021-03-09 RX ADMIN — Medication 3 MG: at 21:31

## 2021-03-09 RX ADMIN — Medication 10 ML: at 21:35

## 2021-03-09 RX ADMIN — BUPRENORPHINE HYDROCHLORIDE AND NALOXONE HYDROCHLORIDE DIHYDRATE 1 TABLET: 8; 2 TABLET SUBLINGUAL at 17:44

## 2021-03-09 RX ADMIN — ACETAMINOPHEN 650 MG: 325 TABLET ORAL at 05:28

## 2021-03-09 RX ADMIN — LORAZEPAM 4 MG: 2 INJECTION INTRAMUSCULAR; INTRAVENOUS at 09:47

## 2021-03-09 RX ADMIN — ACETAMINOPHEN 650 MG: 325 TABLET ORAL at 17:44

## 2021-03-09 RX ADMIN — LORAZEPAM 2 MG: 2 INJECTION INTRAMUSCULAR; INTRAVENOUS at 13:51

## 2021-03-09 RX ADMIN — WATER 2 G: 1 INJECTION INTRAMUSCULAR; INTRAVENOUS; SUBCUTANEOUS at 22:00

## 2021-03-09 RX ADMIN — LORAZEPAM 2 MG: 2 INJECTION INTRAMUSCULAR; INTRAVENOUS at 17:44

## 2021-03-09 RX ADMIN — MULTIPLE VITAMINS W/ MINERALS TAB 1 TABLET: TAB at 09:47

## 2021-03-09 RX ADMIN — Medication 10 ML: at 06:00

## 2021-03-09 RX ADMIN — WATER 2 G: 1 INJECTION INTRAMUSCULAR; INTRAVENOUS; SUBCUTANEOUS at 14:00

## 2021-03-09 NOTE — PROGRESS NOTES
Clinical Pharmacy Note: Stress Ulcer Prophylaxis Protocol (Non-ICU patients)    Please note that stress ulcer prophylaxis is not indicated for patients outside of the ICU. Marylou Delgado has an order for famotidine that has been ordered with an indication of stress ulcer prophylaxis.   No other indication for famotidine has been noted in the patients chart and therefore it has been discontinued per the [facility] Stress Ulcer Prophylaxis Protocol for eligible patients. Please call with any questions.

## 2021-03-09 NOTE — PROGRESS NOTES
Problem: Falls - Risk of  Goal: *Absence of Falls  Description: Document B[place] Led Fall Risk and appropriate interventions in the flowsheet. Outcome: Progressing Towards Goal  Note: Fall Risk Interventions:  Mobility Interventions: Assess mobility with egress test, Bed/chair exit alarm, Patient to call before getting OOB    Mentation Interventions: Adequate sleep, hydration, pain control    Medication Interventions: Teach patient to arise slowly, Patient to call before getting OOB    Elimination Interventions: Bed/chair exit alarm, Call light in reach, Patient to call for help with toileting needs, Stay With Me (per policy), Toilet paper/wipes in reach              Problem: Patient Education: Go to Patient Education Activity  Goal: Patient/Family Education  Outcome: Progressing Towards Goal     Problem: Pressure Injury - Risk of  Goal: *Prevention of pressure injury  Description: Document Terrance Scale and appropriate interventions in the flowsheet.   Outcome: Progressing Towards Goal  Note: Pressure Injury Interventions:  Sensory Interventions: Assess changes in LOC, Assess need for specialty bed, Avoid rigorous massage over bony prominences    Moisture Interventions: Absorbent underpads, Maintain skin hydration (lotion/cream)    Activity Interventions: Assess need for specialty bed    Mobility Interventions: Assess need for specialty bed    Nutrition Interventions: Document food/fluid/supplement intake    Friction and Shear Interventions: Apply protective barrier, creams and emollients                Problem: Patient Education: Go to Patient Education Activity  Goal: Patient/Family Education  Outcome: Progressing Towards Goal     Problem: Pain  Goal: *Control of Pain  Outcome: Progressing Towards Goal  Goal: *PALLIATIVE CARE:  Alleviation of Pain  Outcome: Progressing Towards Goal     Problem: Patient Education: Go to Patient Education Activity  Goal: Patient/Family Education  Outcome: Progressing Towards Goal Problem: Hypertension  Goal: *Blood pressure within specified parameters  Outcome: Progressing Towards Goal  Goal: *Fluid volume balance  Outcome: Progressing Towards Goal  Goal: *Labs within defined limits  Outcome: Progressing Towards Goal     Problem: Patient Education: Go to Patient Education Activity  Goal: Patient/Family Education  Outcome: Progressing Towards Goal     Problem: Anxiety  Goal: *Alleviation of anxiety  Outcome: Progressing Towards Goal  Goal: *Alleviation of anxiety (Palliative Care)  Outcome: Progressing Towards Goal     Problem: Patient Education: Go to Patient Education Activity  Goal: Patient/Family Education  Outcome: Progressing Towards Goal     Problem: Non-Violent Restraints  Goal: Removal from restraints as soon as assessed to be safe  Outcome: Progressing Towards Goal  Goal: No harm/injury to patient while restraints in use  Outcome: Progressing Towards Goal  Goal: Patient's dignity will be maintained  Outcome: Progressing Towards Goal  Goal: Patient Interventions  Outcome: Progressing Towards Goal     Problem: Risk for Spread of Infection  Goal: Prevent transmission of infectious organism to others  Description: Prevent the transmission of infectious organisms to other patients, staff members, and visitors.   Outcome: Progressing Towards Goal     Problem: Patient Education:  Go to Education Activity  Goal: Patient/Family Education  Outcome: Progressing Towards Goal

## 2021-03-09 NOTE — PROGRESS NOTES
Infectious Disease Progress Note          HPI  Ms Ottoniel Reyes sleeping when I went in  Chart reviewed   Events noted regarding patient wanting to leave this am               Physical Exam:    Vitals:   Patient Vitals for the past 24 hrs:   Temp Pulse Resp BP SpO2   03/09/21 0643 98.4 °F (36.9 °C) (!) 122 29 (!) 110/57 99 %   03/09/21 0527 100.1 °F (37.8 °C) (!) 117 24 102/65 97 %   03/08/21 2346 (!) 101.6 °F (38.7 °C) (!) 123 20 (!) 106/58 96 %   03/08/21 2043 99.3 °F (37.4 °C) (!) 123 20 103/67 100 %   03/08/21 1413 98.3 °F (36.8 °C) 99 18 104/71      · GEN NAD  · HEENT: eyes closed, + R IJ  · CV: S1, S2 heard ,  · Lungs:  Clear anteriorly   · Abdomen: soft, non distended,   · Extremities: no edema noted   · Skin on rash on face, upper extremities seen     Labs:   Recent Results (from the past 24 hour(s))   HIV 1/2 AG/AB, 4TH GENERATION,W RFLX CONFIRM    Collection Time: 03/08/21 12:31 PM   Result Value Ref Range    HIV 1/2 Interpretation NONREACTIVE NR      HIV 1/2 result comment SEE NOTE     SAMPLES BEING HELD    Collection Time: 03/09/21  5:45 AM   Result Value Ref Range    SAMPLES BEING HELD 1BLUE,1GOLD,1PST,1LAV     COMMENT        Add-on orders for these samples will be processed based on acceptable specimen integrity and analyte stability, which may vary by analyte.        Microbiology Data:       Blood: 2/28/21  Specimen Information: Blood        Component Value Flag Ref Range Units Status   Special Requests:      Final   No Special Requests    Culture result: Abnormal       Final   Staphylococcus aureus growing in all 4 bottles drawn    Culture result:      Final   Gram pos cocci in clusters   growing in all 4 bottles drawn   CALLED TO Martina Mohan on 3.1.21 at 12:13 by tlw    Susceptibility    Staphylococcus aureus    Antibiotic Interpretation Value Method Comment   Clindamycin ($) Resistant Resistant ug/mL ANGELIKA    Ciprofloxacin ($) Susceptible <=0.5 ug/mL ANGELIKA    Daptomycin ($$$$$) Susceptible 0.25 ug/mL ANGELIKA    Doxycycline ($$) Susceptible <=0.5 ug/mL ANGELIKA    Erythromycin ($$$$) Resistant >=8 ug/mL ANGELIKA    Gentamicin ($) Susceptible <=0.5 ug/mL ANGELIKA    Levofloxacin ($) Susceptible 0.25 ug/mL ANGELIKA    Linezolid ($$$$$) Susceptible 2 ug/mL ANGELIKA    Moxifloxacin ($$$$) Susceptible <=0.25 ug/mL ANGELIKA    Oxacillin Susceptible 0.5 ug/mL ANGELIKA    Rifampin ($$$$) Susceptible <=0.5 ug/mL ANGELIKA Rifampin is not to be used for mono-therapy. Tetracycline Susceptible <=1 ug/mL ANGELIKA    Trimeth/Sulfa Susceptible <=10 ug/mL ANGELIKA    Vancomycin ($) Susceptible 1 ug/mL ANGELIKA        Blood 3/4/21  Special Requests:      Preliminary   NO SPECIAL REQUESTS    Culture result: Abnormal       Preliminary   GRAM POSITIVE COCCI IN CLUSTERS GROWING IN 2 OF 4 BOTTLES DRAWN (SITE = L. AC AND R HAND)    Culture result:      Preliminary   REMAINING BOTTLE(S) HAS/HAVE NO GROWTH SO FAR      Blood 3/5/21  Specimen Information: Blood        Component Value Flag Ref Range Units Status   Special Requests:      Preliminary   NO SPECIAL REQUESTS    Culture result: Abnormal       Preliminary   GRAM POSITIVE COCCI IN CLUSTERS GROWING IN 1 OF 4 BOTTLES DRAWN . Alberto Dover LEFT ARM SITE    Culture result:      Preliminary   REMAINING BOTTLE(S) HAS/HAVE NO GROWTH SO FAR                 Urine:  Special Requests:      Final   NO SPECIAL REQUESTS    [place] Count >100,000   COLONIES/mL      Final   Culture result: Abnormal       Final   STAPHYLOCOCCUS AUREUS    Susceptibility    Staphylococcus aureus    Antibiotic Interpretation Value Method Comment   Ciprofloxacin ($) Susceptible <=0.5 ug/mL ANGELIKA    Gentamicin ($) Susceptible <=0.5 ug/mL ANGELIKA    Nitrofurantoin Susceptible <=16 ug/mL ANGELIKA    Oxacillin Susceptible 0.5 ug/mL ANGELIKA    Rifampin ($$$$) Susceptible <=0.5 ug/mL ANGELIKA Rifampin is not to be used for mono-therapy.    Tetracycline Susceptible <=1 ug/mL ANGELIKA    Trimeth/Sulfa Susceptible <=10 ug/mL ANGELIKA    Vancomycin ($) Susceptible 1 ug/mL ANGELIKA    Levofloxacin ($) Susceptible 0.5 ug/mL ANGELIKA    Linezolid ($$$$$) Susceptible 2 ug/mL ANGELIKA    Daptomycin ($$$$$) Susceptible 0.25 ug/mL ANGELIKA    Moxifloxacin ($$$$) Susceptible <=0.25 ug/mL ANGELIKA    Doxycycline ($$) Susceptible <=0.5 ug/mL ANGELIKA        Component Value Flag Ref Range Units Status   GDH ANTIGEN Negative   NEG   Final   C. difficile toxin Negative   NEG   Final   INTERPRETATION   NTXCD   Final   NEGATIVE FOR TOXIGENIC C. DIFFICILE                Imaging:   CT chest  FINDINGS:  The lungs are clear of mass, nodule, airspace disease or edema. There is  opacification of peripheral lower lobe airways bilaterally.     The pulmonary arteries are well enhanced and no pulmonary emboli are identified.     There is no mediastinal or hilar adenopathy or mass. The aorta enhances normally  without evidence of aneurysm or dissection.     The visualized portions of the upper abdominal organs are normal.     IMPRESSION  No pulmonary process or septic emboli. Lower lobe branch bronchial  opacification likely reflecting  secretions or aspiration. CT ABD  FINDINGS: Motion artifact limits evaluation  LOWER THORAX: Left lower lobe airspace disease with a small left pleural  effusion. LIVER: No mass. BILIARY TREE: Gallbladder is within normal limits. CBD is not dilated. SPLEEN: Borderline enlarged spleen  PANCREAS: No mass or ductal dilatation. ADRENALS: Unremarkable. KIDNEYS: No mass, calculus, or hydronephrosis. STOMACH: Unremarkable. SMALL BOWEL: No dilatation or wall thickening. COLON: No dilatation or wall thickening. APPENDIX: Unremarkable  PERITONEUM: Free fluid in the pelvis  RETROPERITONEUM: No lymphadenopathy or aortic aneurysm. REPRODUCTIVE ORGANS: Unremarkable  URINARY BLADDER: Distended urinary bladder  BONES: No destructive bone lesion. ABDOMINAL WALL: No mass or hernia. ADDITIONAL COMMENTS: N/A     IMPRESSION  1. Limited by motion.     2.  Left lower lobe airspace disease and left effusion suspicious for pneumonia.     3. Distended urinary bladder.     4. Free fluid in the pelvis. No evidence of bowel obstruction or free  intraperitoneal air    TTE  · LV: Calculated LVEF is 71%. Normal cavity size, wall thickness, systolic function (ejection fraction normal) and diastolic function. Wall motion: normal. Normal left ventricular strain. · Image quality for this study was suboptimal.  · MV: Mitral valve non-specific thickening. Mild mitral valve prolapse of the posterior leaflet. Severe mitral valve regurgitation is present. Possible vegetation present on the posterior leaflet of the mitral valve. Posterior mitral leaflet vegetation is moderately sized. Vegetation is localized on the atrial aspect of the posterior mitral leaflet. Echo Findings    Left Ventricle Normal cavity size, wall thickness, systolic function (ejection fraction normal) and diastolic function. The muscle mass is normal. The cavity shape is normal. Wall motion: normal. The calculated EF is 71%. End-systolic volume is normal. Normal left ventricular strain. Normal left ventricular diastolic pressure. End-diastolic volume is normal.   Left Atrium Normal cavity size. Right Ventricle Normal cavity size, wall thickness and global systolic function. Right Atrium Normal cavity size. Aortic Valve Aortic valve not well visualized. No stenosis and no regurgitation. Mitral Valve Mitral valve not well visualized. No stenosis. Mitral valve non-specific thickening. Mild mitral valve prolapse of the posterior leaflet. Severe regurgitation. The mitral regurgitant jet is eccentric. There is possible vegetation on the posterior leaflet of the mitral valve. Posterior leaflet vegetation is moderately sized. The vegetation is localized on the atrial aspect of the posterior leaflet. Tricuspid Valve Normal valve structure, no stenosis and no regurgitation. Pulmonic Valve Normal valve structure, no stenosis and no regurgitation.    Aorta Normal aortic root, ascending aortic, and aortic arch. Pericardium Normal pericardium and no evidence of pericardial effusion       MRI     FINDINGS:  There are multiple foci of T2 hyperintensity in the cerebral white matter  predominantly periventricular and some possibly pericallosal in a nonspecific  pattern. There is no definitive associated restricted diffusion in these foci  although there is suggested minimal T2 shine through. There are several punctate foci of restricted diffusion in the right greater  than left posterior frontal and parietal lobes near the gray-white junction. This is in a pattern which would be consistent with emboli in this patient with  known cardiac valve vegetation. A few tiny punctate foci of hypointensity  possible on gradient echo. SWI was not performed. Ventricular size and  configuration are normal. No definite acute intracranial hemorrhage, mass or  abnormal extra-axial fluid collections. Flow voids are present in vertebral  basilar and carotid artery systems. Mild mucosal thickening paranasal sinuses.     IMPRESSION  1. Multiple punctate foci restricted diffusion and some associated T2  hyperintensity gray-white junction greater in the right hemisphere suggesting  emboli. 2. Other multifocal white matter T2 hyperintensity may be more chronic and of  uncertain etiology.         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 and concern for brain emboli on MRI   Hx of IVDU , urine drug screen positive for amphetamines and THC  sepsis   + Rhinovirus, + Enterovirus on resp panel    hypernatremia   elevated inflammatory markers   mild elevation bilirubin and AST   altered mental status, encephalopathy   history of overdose per chart  thrombocytopenia  chart reported history of hepatitis C   chart reported history of anxiety, aggressive outbursts, schizoaffective disorder        Plan:  Repeat blood cx 3/7 pending ( negative so far)  Blood cx + from 2/28-3/5/21    Renally dosed Cefazolin Seen  by CTS ( KELLY per CTS)   MRI brain with \"  Multiple punctate foci restricted diffusion and some associated T2 hyperintensity gray-white junction greater in the right hemisphere suggesting emboli\"   Repeat blood cx if febrile   Rhinovirus and Enterovirus detected on respiratory panel -- supportive care   Resources for addiction medicine per primary team    Hep C RNA not detected   HIV NR, VL pending       Thank for the opportunity to participate in the care of this patient. Please contact with questions or concerns.            Rylee Garza DO  12:05 PM

## 2021-03-09 NOTE — PROGRESS NOTES
0600- Pt expressed to RN that she wanted to leave AMA. RN educated pt on pt's condition and need for further medical care, as well as explaining risks involved with going home. RN notified charge RN and NP. Upon discussing further with pt, pt stated she would wait until the dayshift MDs arrived to make her decision. Pt got out of bed multiple times by herself. All fall identifiers in place. Bed alarm on. Pt educated on need to call nurse d/t weakness and risk for falls. Verbal shift change report given to Roni Schulte RN (oncoming nurse) by Barbara Zimmerman RN (offgoing nurse).  Report included the following information SBAR, Kardex, ED Summary, Intake/Output, MAR, Accordion, Recent Results and Med Rec Status, Cardiac Rhythm ST.

## 2021-03-09 NOTE — PROGRESS NOTES
1000: Pt demanding to leave. Explained to patient why she is here and that she is needs the antibiotics to be given in the hospital. She sated she \"had another hospital lined up. \" She then stated she just wanted \"a moment to herself. \" I offered to close her room door for a little while for a moment of privacy. She stated \"I just want to take a walk. \" I offered to walk her around the unit. She said \"it is too late. I am leaving. \" Koki Woods MD came to bedside and spoke with patient. Mom & Dad called and were updated. Both would like her to be medically TDO'd again. Mom spoke to patient. She is willing to stay until mom gets here to visit. Refusing to allow us to place tele leads on her. 1141: Pt resting in bed with eyes closed.

## 2021-03-09 NOTE — PROGRESS NOTES
Physician Progress Note      Jesus Manuel Cleaning  CSN #:                  [id number]  :                       1990  ADMIT DATE:       2021 8:03 PM  [address] [place] DATE:  RESPONDING  PROVIDER #:        Pam Rueda MD          QUERY TEXT:    Pt admitted with sepsis, AMS. Pt noted to have positive UCx. If possible, please document in the progress notes and discharge summary if you are evaluating and/or treating any of the following: The medical record reflects the following:  Risk Factors: diarrhea    Clinical Indicators:    UA= clear, blood LG, Nitrites+, Leuks MOD, WBC 20-50, Bact 1+  UCx= >100k POSSIBLE STAPHYLOCOCCUS AUREUS    Treatment: Cefepime 2g IV Q 8hrs; Vanc 1g IV Q 8hrs; Rocephin 1-2g IV; Zosyn 3.375g IV Q 8hrs; Thank you,  All Patel RN, BSN, [facility]  Clinical   [phone]  Options provided:  -- Urinary Tract Infection (UTI)  -- Bacteriuria  -- Other - I will add my own diagnosis  -- Disagree - Not applicable / Not valid  -- Disagree - Clinically unable to determine / Unknown  -- Refer to Clinical Documentation Reviewer    PROVIDER RESPONSE TEXT:    This patient has a UTI.     Query created by: Keary Claude on 3/3/2021 7:51 AM      Electronically signed by:  Pam Rueda MD 3/9/2021 9:18 AM

## 2021-03-09 NOTE — PROGRESS NOTES
[facility] [facility]                                                                                          Hospitalist Progress Note  Denise Pastor MD  Answering service: [phone] from in house phone        Date of Service:  3/9/2021  NAME:  Dawit Roy  :  1990  MRN:  [id number]      Please note that this dictation was completed with [employer], the computer voice recognition software. Quite often unanticipated grammatical, syntax, homophones, and other interpretive errors are inadvertently transcribed by the computer software. Please disregard these errors. Please excuse any errors that have escaped final proofreading. Admission Summary:   Jonathan Matta is a 31 y.o.  female who presents with above symptoms. Started suddenly. Progressively worse. Patient with altered mental status not willing and unable to provide information. Mother reports she is adult but lives at her house. Not sure when she used any type of IV drugs last time. Possibly yesterday. Reports she has been dealing with IV drug abuse for many years. Apparently after an . Mother reports she has been multiple times in, due to narcotic overdose, reversed by nasal Narcan. She also has history of amphetamine use, unknown last use. Last 4 days unable to ambulate. She has been suffering from severe myalgia and fever occasional cough, negative for Covid no exposure. Mother reports she was negative as well. Patient and mother report, never had similar symptoms in the past.  She takes Suboxone and Xanax as needed for anxiety. Not have any other prescriptions.       Interval history / Subjective:   Patient seen and examined today. Labs imaging and chart reviewed.   Again at around 10:00 today she wanted to leave the hospital discussed with RN discussed with the patient as well in the room she said I have the right to make my own decision requested psych to evaluate for medical decision making capacity from the hospital RN informed patient's parents  Requested for extension of medical TDO as of yesterday did not hear from the      Assessment & Plan:     Severe Sepsis due to staph aureus bacteremia POA, likely infective endocarditis  Staph aureus high grade bacteremia, leukocytosis, elevated CRP and tachycardia    IV drug abuser, per mother shot heroin a few days back    Started on vancomycin and Zosyn, changed to cefepime and Flagyl by ID  Now changed to cefazolin per ID     Repeat blood cultures remained positive, now improving from 3/7 negative so far   CT chest shows a may be possibility of some aspiration  Respiratory PCR negative including SARS    Echocardiogram shows a posterior mitral valve vegetation and severe MR.   CT surgery consulted, will continue to follow    MRI of the brain-  Multiple punctate foci restricted diffusion and some associated T2  hyperintensity gray-white junction greater in the right hemisphere suggesting  Emboli. Other multifocal white matter T2 hyperintensity may be more chronic and of  uncertain etiology. Prognosis remains poor due to persistent fever and bacteremia    Altered mental status, multifactorial now alert and awake  Encephalopathy from sepsis and withdrawal from drugs, heroin withdrawal and benzo withdrawal, and embolic events  CT of the head without any acute abnormality. MRI with Multiple punctate foci restricted diffusion and some associated T2  hyperintensity gray-white junction greater in the right hemisphere suggesting emboli.   Seen by Neurology  Acute stroke, septic embolism     Prognosis is poor  Remains at risk for recurrent septic emboli and severe stroke  Stopped Lovenox due to risk of hemorrhagic conversion    Worsening anemia  Etiology unclear, likely multifactorial, watch for GI bleed  Stopped Lovenox  PUD prophylaxis  Transfuse prn hgb 7.1    Possibility of her benzo withdrawal and heroin withdrawal and/or amphetamine overdose  Started patient back on Xanax and her Suboxone  Symptomatic treatment needed  IV Ativan as needed. Mental status remains tenuous    Hypokalemia  IV fluids with potassium  Resolved    Thrombocytopenia  Due to sepsis, improved, monitor    Diarrhea. C difficile negative  CT abdomen unremarkable  Monitor    Hypernatremia and hypokalemia  Due to free water loss from a diarrhea. Improving  Body mass index is 19.03 kg/m². History of hepatitis C-negative viral load HIV NR, VL pending   Rhinovirus and Enterovirus detected on respiratory panel -- supportive care     Code status: FULL  DVT prophylaxis:SCD    Care Plan discussed with: Patient/Family  Anticipated Disposition: Home w/Family  Anticipated Discharge: Greater than 48 hours   Communicated to [employer] awaiting response for [facility] Problems  Never Reviewed          Codes Class Noted POA    Cerebrovascular accident (CVA) due to embolism of precerebral artery ([facility] [address] ICD-10-CM: I63.10  ICD-9-CM: 434.11  3/6/2021 Unknown        Sepsis ([facility] [address] ICD-10-CM: A41.9  ICD-9-CM: 038.9, 995.91  2/28/2021 Unknown              Review of Systems:   A comprehensive review of systems was negative except for that written in the HPI. Vital Signs:    Last 24hrs VS reviewed since prior progress note. Most recent are:  Visit Vitals  BP (!) 110/57 (BP 1 Location: Left upper arm, BP Patient Position: At rest)   Pulse (!) 122   Temp 98.4 °F (36.9 °C)   Resp 29   Ht 5' 6\" (1.676 m)   Wt 53.5 kg (117 lb 14.4 oz)   SpO2 99%   BMI 19.03 kg/m²       No intake or output data in the 24 hours ending 03/09/21 1236     Physical Examination:     I had a face to face encounter with this patient and independently examined them on 03/09/21 as outlined below:        Constitutional:  awake and talking today    ENT:  Oral mucosa moist, oropharynx benign. Resp:  CTA bilaterally. No wheezing/rhonchi/rales.  No accessory muscle use   CV:  Regular rhythm, normal rate, no murmurs, gallops, rubs    GI:  Soft, non distended, non tender. normoactive bowel sounds, no hepatosplenomegaly     Musculoskeletal:  Moving around in the bed    Neurologic: Alert and oriented             Data Review:    Review and/or order of clinical lab test      Labs:     Recent Labs     03/07/21 0442   WBC 10.9   HGB 7.1*   HCT 21.5*        Recent Labs     03/07/21 0442      K 3.5   *   CO2 25   BUN 6   CREA 0.44*      CA 8.5   MG 2.1     Recent Labs     03/07/21 0442   ALT 18   AP 90   TBILI 0.4   TP 6.2*   ALB 1.8*   GLOB 4.4*     No results for input(s): INR, PTP, APTT, INREXT, INREXT in the last 72 hours. No results for input(s): FE, TIBC, PSAT, FERR in the last 72 hours. No results found for: FOL, RBCF   No results for input(s): PH, PCO2, PO2 in the last 72 hours. No results for input(s): CPK, CKNDX, TROIQ in the last 72 hours.     No lab exists for component: CPKMB  Lab Results   Component Value Date/Time    Cholesterol, total 74 03/07/2021 04:42 AM    HDL Cholesterol 21 03/07/2021 04:42 AM    LDL, calculated 38.6 03/07/2021 04:42 AM    Triglyceride 72 03/07/2021 04:42 AM    CHOL/HDL Ratio 3.5 03/07/2021 04:42 AM     Lab Results   Component Value Date/Time    Glucose (POC) 132 (H) 03/03/2021 05:27 AM    Glucose (POC) 118 (H) 03/03/2021 12:18 AM     Lab Results   Component Value Date/Time    Color DARK YELLOW 02/28/2021 06:12 PM    Appearance CLEAR 02/28/2021 06:12 PM    Specific gravity 1.020 02/28/2021 06:12 PM    Specific gravity 1.016 02/28/2021 06:00 AM    pH (UA) 6.5 02/28/2021 06:12 PM    Protein 100 (A) 02/28/2021 06:12 PM    Glucose Negative 02/28/2021 06:12 PM    Ketone 15 (A) 02/28/2021 06:12 PM    Bilirubin Negative 02/28/2021 06:00 AM    Urobilinogen 2.0 (H) 02/28/2021 06:12 PM    Nitrites Positive (A) 02/28/2021 06:12 PM    Leukocyte Esterase MODERATE (A) 02/28/2021 06:12 PM    Epithelial cells FEW 02/28/2021 06:12 PM    Bacteria 1+ (A) 02/28/2021 06:12 PM    WBC 20-50 02/28/2021 06:12 PM    RBC >100 (H) 02/28/2021 06:12 PM         Medications Reviewed:     Current Facility-Administered Medications   Medication Dose Route Frequency    haloperidol lactate (HALDOL) injection 5 mg  5 mg IntraMUSCular Q4H PRN    multivitamin, tx-iron-ca-min (THERA-M w/ IRON) tablet 1 Tab  1 Tab Oral DAILY    ceFAZolin (ANCEF) 2 g in sterile water (preservative free) 20 mL IV syringe  2 g IntraVENous Q8H    melatonin tablet 3 mg  3 mg Oral QHS PRN    nicotine (NICODERM CQ) 21 mg/24 hr patch 1 Patch  1 Patch TransDERmal DAILY    LORazepam (ATIVAN) injection 4 mg  4 mg IntraVENous Q4H PRN    LORazepam (ATIVAN) injection 2 mg  2 mg IntraVENous Q2H PRN    influenza vaccine 2020-21 (6 mos+)(PF) (FLUARIX/FLULAVAL/FLUZONE QUAD) injection 0.5 mL  0.5 mL IntraMUSCular PRIOR TO DISCHARGE    buprenorphine-naloxone (SUBOXONE) 8-2mg SL tablet  1 Tab SubLINGual BID    sodium chloride (NS) flush 5-40 mL  5-40 mL IntraVENous Q8H    sodium chloride (NS) flush 5-40 mL  5-40 mL IntraVENous PRN    acetaminophen (TYLENOL) tablet 650 mg  650 mg Oral Q6H PRN    Or    acetaminophen (TYLENOL) suppository 650 mg  650 mg Rectal Q6H PRN    polyethylene glycol (MIRALAX) packet 17 g  17 g Oral DAILY PRN    promethazine (PHENERGAN) tablet 12.5 mg  12.5 mg Oral Q6H PRN    Or    ondansetron (ZOFRAN) injection 4 mg  4 mg IntraVENous Q6H PRN     ______________________________________________________________________  EXPECTED LENGTH OF STAY: 4d 19h  ACTUAL LENGTH OF STAY:          9                 Denise Pastor MD

## 2021-03-10 PROCEDURE — 74011250637 HC RX REV CODE- 250/637: Performed by: NURSE PRACTITIONER

## 2021-03-10 PROCEDURE — 99232 SBSQ HOSP IP/OBS MODERATE 35: CPT | Performed by: INTERNAL MEDICINE

## 2021-03-10 PROCEDURE — 74011250636 HC RX REV CODE- 250/636: Performed by: INTERNAL MEDICINE

## 2021-03-10 PROCEDURE — 74011250637 HC RX REV CODE- 250/637: Performed by: INTERNAL MEDICINE

## 2021-03-10 PROCEDURE — 65660000001 HC RM ICU INTERMED STEPDOWN

## 2021-03-10 PROCEDURE — 74011000250 HC RX REV CODE- 250: Performed by: INTERNAL MEDICINE

## 2021-03-10 RX ORDER — ACETAMINOPHEN 325 MG/1
650 TABLET ORAL
Status: DISCONTINUED | OUTPATIENT
Start: 2021-03-10 | End: 2021-03-17 | Stop reason: HOSPADM

## 2021-03-10 RX ORDER — FAMOTIDINE 20 MG/1
20 TABLET, FILM COATED ORAL DAILY
Status: DISCONTINUED | OUTPATIENT
Start: 2021-03-10 | End: 2021-03-10

## 2021-03-10 RX ORDER — ACETAMINOPHEN 650 MG/1
650 SUPPOSITORY RECTAL
Status: DISCONTINUED | OUTPATIENT
Start: 2021-03-10 | End: 2021-03-17 | Stop reason: HOSPADM

## 2021-03-10 RX ORDER — IBUPROFEN 600 MG/1
600 TABLET ORAL
Status: DISCONTINUED | OUTPATIENT
Start: 2021-03-10 | End: 2021-03-17 | Stop reason: HOSPADM

## 2021-03-10 RX ADMIN — HALOPERIDOL LACTATE 5 MG: 5 INJECTION, SOLUTION INTRAMUSCULAR at 13:58

## 2021-03-10 RX ADMIN — BUPRENORPHINE HYDROCHLORIDE AND NALOXONE HYDROCHLORIDE DIHYDRATE 1 TABLET: 8; 2 TABLET SUBLINGUAL at 18:25

## 2021-03-10 RX ADMIN — ACETAMINOPHEN 650 MG: 325 TABLET ORAL at 03:36

## 2021-03-10 RX ADMIN — MULTIPLE VITAMINS W/ MINERALS TAB 1 TABLET: TAB at 09:02

## 2021-03-10 RX ADMIN — Medication 10 ML: at 06:06

## 2021-03-10 RX ADMIN — FAMOTIDINE 20 MG: 20 TABLET, FILM COATED ORAL at 00:34

## 2021-03-10 RX ADMIN — IBUPROFEN 600 MG: 600 TABLET, FILM COATED ORAL at 00:34

## 2021-03-10 RX ADMIN — ACETAMINOPHEN 650 MG: 325 TABLET ORAL at 11:57

## 2021-03-10 RX ADMIN — Medication 10 ML: at 13:58

## 2021-03-10 RX ADMIN — ACETAMINOPHEN 650 MG: 325 TABLET ORAL at 20:30

## 2021-03-10 RX ADMIN — BUPRENORPHINE HYDROCHLORIDE AND NALOXONE HYDROCHLORIDE DIHYDRATE 1 TABLET: 8; 2 TABLET SUBLINGUAL at 09:02

## 2021-03-10 RX ADMIN — HALOPERIDOL LACTATE 5 MG: 5 INJECTION, SOLUTION INTRAMUSCULAR at 10:10

## 2021-03-10 RX ADMIN — WATER 2 G: 1 INJECTION INTRAMUSCULAR; INTRAVENOUS; SUBCUTANEOUS at 23:16

## 2021-03-10 RX ADMIN — WATER 2 G: 1 INJECTION INTRAMUSCULAR; INTRAVENOUS; SUBCUTANEOUS at 06:05

## 2021-03-10 RX ADMIN — HALOPERIDOL LACTATE 5 MG: 5 INJECTION, SOLUTION INTRAMUSCULAR at 03:36

## 2021-03-10 RX ADMIN — WATER 2 G: 1 INJECTION INTRAMUSCULAR; INTRAVENOUS; SUBCUTANEOUS at 13:58

## 2021-03-10 RX ADMIN — Medication 10 ML: at 23:16

## 2021-03-10 NOTE — ACP (ADVANCE CARE PLANNING)
requested for Advance Directive (AMD) consult. Spoke to the nurse concerning AMD and patient's ability to complete AMD.  Due to patient's health and health history, Section C of AMD will need to be signed by the patient's physician or licensed clinical psychologist verifying that the patient is capable of making an informed decision and understands the consequences of the special londono given to the agent she chooses to appoint for her end of life care. Provided a blank copy of AMD, a copy of the booklet, \"Your Right to Decide,\" and information card informing patient and staff of availability of  including contact information for physician or licensed clinical psychologist signature. Asked staff to contact the  once Section C is signed and dated in order to assist the patient in completing and processing the AMD.      Rev.  Jung Bowens MDiv, [facility], [facility]   paging service: [phone])

## 2021-03-10 NOTE — PROGRESS NOTES
Bed alarming d/t pt needing to use the restroom. Assisted pt to the restroom 1x assist.    Unable to measure output, pt voided in the toilet and flushed. Assisted pt safely back to bed and reapplied the bed alarm.

## 2021-03-10 NOTE — CONSULTS
[address]    Name:  Chavez Gong  MR#:  [id number]  :  1990  ACCOUNT #:  [de-identified]  DATE OF SERVICE:  2021    BEHAVIORAL HEALTH CONSULTATION    CHIEF COMPLAINT:  \"I can go to another hospital.\"    HISTORY OF PRESENT ILLNESS:  The patient is a 67-year-old female who is currently being seen in the medical unit for psychiatric consultation for evaluation of her substance use. She has a significant history of heroin dependence. She is lethargic during the interview. I had to call her name several times to wake her up. She kept dozing off. I had to tell her several times that she has to open her eyes and try to participate well so that I could be of help. After several tries, she perked up and was able to participate well with the consult. She states that she has been shooting up heroin \"for too long\" and has been \"using too much. 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 Her urine drug screen is positive for amphetamines and marijuana. Medical TDO was initially obtained, but unfortunately it  yesterday. Hospitalist attempted to get this renewed, but the  had declined the petition. She has been demanding to be discharged against medical advice. When I asked her about this and what were the repercussions if she ever got discharged, she tells me that she is aware that she could potentially die if she ever gets discharged against medical advice. She is also aware that she can get better if she stays. She tells me that if she gets discharged, she would go to another hospital because she is feeling weak and all she wants is for her to walk. She has struggled with IV drug use for many years, reportedly after an . Her mother reported on admission that she has been noted 4 times to have a narcotic overdose and was reversed by nasal Narcan. Her urine drug screen is positive for amphetamines and THC.   She is currently being prescribed Suboxone here in the hospital, but there have been concerns that she throws these into her pocket. Her boyfriend also over the weekend brought her crystal methamphetamine. I asked her many times if she fully understands the risk and she said yes. She denies being depressed or anxious. She denies suicidal ideation, homicidal ideation, auditory or visual hallucinations. After a lengthy discussion with her, she agreed to stay and be compliant with the treatment. PAST MEDICAL HISTORY:  See H and P. Past Medical History:   Diagnosis Date    Aggressive outburst     Anxiety disorder     Hepatitis C     IV drug abuse ([facility] [address]        Labs: (reviewed/updated 3/10/2021)  Patient Vitals for the past 8 hrs:   BP Temp Pulse Resp SpO2 Weight   03/10/21 0732 113/74 99.1 °F (37.3 °C) (!) 118 20 98 %    03/10/21 0335 97/68 98.1 °F (36.7 °C) (!) 107 20 99 % 53.4 kg (117 lb 12.8 oz)   03/10/21 0238  98.6 °F (37 °C)         Labs Reviewed   CULTURE, URINE - Abnormal; Notable for the following components:       Result Value    Culture result: STAPHYLOCOCCUS AUREUS (*)     All other components within normal limits   RESPIRATORY VIRUS PANEL W/COVID-19, PCR - Abnormal; Notable for the following components:    Rhinovirus and Enterovirus Detected (*)     All other components within normal limits   CULTURE, BLOOD, PAIRED - Abnormal; Notable for the following components:    Culture result:   (*)     Value: STAPHYLOCOCCUS AUREUS GROWING IN 2 OF 4 BOTTLES DRAWN (SITES = LAC AND R HAND)    All other components within normal limits   CULTURE, BLOOD, PAIRED - Abnormal; Notable for the following components:    Culture result:   (*)     Value: STAPHYLOCOCCUS AUREUS GROWING IN 1 OF 4 BOTTLES DRAWN . Megan Graven Megan Graven LEFT ARM SITE PLEASE REFER TO [id number] FOR SENSITIVITIES    All other components within normal limits   CBC WITH AUTOMATED DIFF - Abnormal; Notable for the following components:    WBC 13.4 (*)     PLATELET 61 (*)     NEUTROPHILS 96 (*) LYMPHOCYTES 3 (*)     MONOCYTES 1 (*)     ABS. NEUTROPHILS 12.9 (*)     ABS. LYMPHOCYTES 0.4 (*)     All other components within normal limits   METABOLIC PANEL, COMPREHENSIVE - Abnormal; Notable for the following components:    Potassium 3.0 (*)     Glucose 142 (*)     BUN/Creatinine ratio 36 (*)     Bilirubin, total 2.3 (*)     Alk. phosphatase 202 (*)     Albumin 2.6 (*)     Globulin 4.2 (*)     A-G Ratio 0.6 (*)     All other components within normal limits   TROPONIN I - Abnormal; Notable for the following components:    Troponin-I, Qt. 0.18 (*)     All other components within normal limits   DRUG SCREEN, URINE - Abnormal; Notable for the following components:    AMPHETAMINES Positive (*)     THC (TH-CANNABINOL) Positive (*)     All other components within normal limits   URINALYSIS W/MICROSCOPIC - Abnormal; Notable for the following components:    Protein 100 (*)     Ketone 15 (*)     Blood LARGE (*)     Urobilinogen 2.0 (*)     Nitrites Positive (*)     Leukocyte Esterase MODERATE (*)     RBC >100 (*)     Bacteria 1+ (*)     All other components within normal limits   SED RATE (ESR) - Abnormal; Notable for the following components:    Sed rate, automated 41 (*)     All other components within normal limits   C REACTIVE PROTEIN, QT - Abnormal; Notable for the following components:    C-Reactive protein 32.50 (*)     All other components within normal limits   METABOLIC PANEL, COMPREHENSIVE - Abnormal; Notable for the following components:    Potassium 3.0 (*)     Chloride 111 (*)     Glucose 108 (*)     Creatinine 0.45 (*)     BUN/Creatinine ratio 42 (*)     Bilirubin, total 2.2 (*)     Alk.  phosphatase 177 (*)     Protein, total 6.0 (*)     Albumin 2.1 (*)     A-G Ratio 0.5 (*)     All other components within normal limits   CBC WITH AUTOMATED DIFF - Abnormal; Notable for the following components:    WBC 14.5 (*)     HGB 11.1 (*)     HCT 31.6 (*)     PLATELET 56 (*)     NEUTROPHILS 94 (*)     LYMPHOCYTES 2 (*)     MONOCYTES 2 (*)     ABS. NEUTROPHILS 13.9 (*)     ABS. LYMPHOCYTES 0.3 (*)     All other components within normal limits   TROPONIN I - Abnormal; Notable for the following components:    Troponin-I, Qt. 0.18 (*)     All other components within normal limits   METABOLIC PANEL, COMPREHENSIVE - Abnormal; Notable for the following components:    Sodium 151 (*)     Potassium 2.8 (*)     Chloride 121 (*)     CO2 20 (*)     Glucose 116 (*)     BUN 35 (*)     Creatinine 0.51 (*)     BUN/Creatinine ratio 69 (*)     Bilirubin, total 1.3 (*)     AST (SGOT) 42 (*)     Alk. phosphatase 183 (*)     Protein, total 6.3 (*)     Albumin 2.2 (*)     Globulin 4.1 (*)     A-G Ratio 0.5 (*)     All other components within normal limits   METABOLIC PANEL, BASIC - Abnormal; Notable for the following components:    Sodium 152 (*)     Potassium 2.7 (*)     Chloride 122 (*)     CO2 19 (*)     Glucose 108 (*)     BUN 34 (*)     BUN/Creatinine ratio 62 (*)     All other components within normal limits   CBC WITH AUTOMATED DIFF - Abnormal; Notable for the following components:    WBC 16.4 (*)     RBC 3.57 (*)     HGB 10.3 (*)     HCT 30.2 (*)     PLATELET 62 (*)     NEUTROPHILS 84 (*)     LYMPHOCYTES 9 (*)     IMMATURE GRANULOCYTES 2 (*)     ABS. NEUTROPHILS 13.8 (*)     ABS. IMM. GRANS. 0.3 (*)     All other components within normal limits   POC EG7 - Abnormal; Notable for the following components:    pH (POC) 7.52 (*)     pCO2 (POC) 23.0 (*)     pO2 (POC) 78 (*)     HCO3 (POC) 18.6 (*)     All other components within normal limits   METABOLIC PANEL, COMPREHENSIVE - Abnormal; Notable for the following components:    Sodium 150 (*)     Chloride 124 (*)     CO2 18 (*)     Glucose 119 (*)     BUN 28 (*)     Creatinine 0.53 (*)     BUN/Creatinine ratio 53 (*)     Bilirubin, total 1.4 (*)     AST (SGOT) 39 (*)     Alk. phosphatase 165 (*)     Albumin 2.2 (*)     Globulin 4.4 (*)     A-G Ratio 0.5 (*)     All other components within normal  limits   CBC WITH AUTOMATED DIFF - Abnormal; Notable for the following components:    WBC 19.4 (*)     RBC 3.48 (*)     HGB 10.1 (*)     HCT 29.4 (*)     RDW 14.7 (*)     PLATELET 361 (*)     NEUTROPHILS 82 (*)     LYMPHOCYTES 9 (*)     IMMATURE GRANULOCYTES 2 (*)     ABS. NEUTROPHILS 15.9 (*)     ABS. MONOCYTES 1.3 (*)     ABS. IMM. GRANS. 0.4 (*)     All other components within normal limits   CBC WITH AUTOMATED DIFF - Abnormal; Notable for the following components:    WBC 16.9 (*)     RBC 3.11 (*)     HGB 9.1 (*)     HCT 26.4 (*)     PLATELET 284 (*)     NEUTROPHILS 79 (*)     IMMATURE GRANULOCYTES 2 (*)     ABS. NEUTROPHILS 13.3 (*)     ABS. IMM. GRANS. 0.3 (*)     All other components within normal limits   METABOLIC PANEL, BASIC - Abnormal; Notable for the following components:    Potassium 2.4 (*)     Chloride 111 (*)     Glucose 134 (*)     Creatinine 0.52 (*)     BUN/Creatinine ratio 29 (*)     Calcium 8.1 (*)     All other components within normal limits   METABOLIC PANEL, BASIC - Abnormal; Notable for the following components:    Potassium 3.0 (*)     Chloride 109 (*)     Glucose 101 (*)     Creatinine 0.52 (*)     Calcium 7.5 (*)     All other components within normal limits   CBC WITH AUTOMATED DIFF - Abnormal; Notable for the following components:    WBC 12.2 (*)     RBC 2.44 (*)     HGB 7.2 (*)     HCT 21.0 (*)     NEUTROPHILS 76 (*)     IMMATURE GRANULOCYTES 2 (*)     ABS. NEUTROPHILS 9.2 (*)     ABS. IMM. GRANS. 0.2 (*)     All other components within normal limits   METABOLIC PANEL, BASIC - Abnormal; Notable for the following components:    Chloride 114 (*)     Glucose 115 (*)     Creatinine 0.52 (*)     Calcium 7.3 (*)     All other components within normal limits   CBC WITH AUTOMATED DIFF - Abnormal; Notable for the following components:    WBC 11.3 (*)     RBC 2.33 (*)     HGB 6.7 (*)     HCT 20.5 (*)     NEUTROPHILS 76 (*)     IMMATURE GRANULOCYTES 1 (*)     ABS. NEUTROPHILS 8.6 (*)     ABS. IMM. Priscilla Nelson. 0.1 (*)     All other components within normal limits   METABOLIC PANEL, BASIC - Abnormal; Notable for the following components:    Chloride 111 (*)     Glucose 132 (*)     BUN 5 (*)     Creatinine 0.43 (*)     Calcium 7.8 (*)     All other components within normal limits   CBC WITH AUTOMATED DIFF - Abnormal; Notable for the following components:    RBC 2.43 (*)     HGB 7.1 (*)     HCT 21.5 (*)     IMMATURE GRANULOCYTES 1 (*)     ABS. NEUTROPHILS 8.2 (*)     ABS. IMM.  GRANS. 0.1 (*)     All other components within normal limits   METABOLIC PANEL, COMPREHENSIVE - Abnormal; Notable for the following components:    Chloride 111 (*)     Creatinine 0.44 (*)     Protein, total 6.2 (*)     Albumin 1.8 (*)     Globulin 4.4 (*)     A-G Ratio 0.4 (*)     All other components within normal limits   GLUCOSE, POC - Abnormal; Notable for the following components:    Glucose (POC) 118 (*)     All other components within normal limits   GLUCOSE, POC - Abnormal; Notable for the following components:    Glucose (POC) 132 (*)     All other components within normal limits   URINE CULTURE HOLD SAMPLE   C. DIFFICILE AG & TOXIN A/B   CULTURE, BLOOD, PAIRED   SAMPLES BEING HELD   AMMONIA   BILIRUBIN, CONFIRM   LACTIC ACID   SAMPLES BEING HELD   SAMPLES BEING HELD   VANCOMYCIN, TROUGH   MAGNESIUM   PHOSPHORUS   HEPATITIS C QT BY PCR WITH REFLEX GENOTYPE   SAMPLES BEING HELD   MAGNESIUM   LIPID PANEL   HEMOGLOBIN A1C WITH EAG   HIV 1/2 AG/AB, 4TH GENERATION,W RFLX CONFIRM   HIV-1 RNA QT BY PCR   SAMPLES BEING HELD   CBC WITH AUTOMATED DIFF   BLOOD GAS, ARTERIAL   HCG URINE, QL. - POC   TYPE & SCREEN   SAMPLE TO BLOOD BANK     Lab Results   Component Value Date/Time    Sodium 142 03/07/2021 04:42 AM    Potassium 3.5 03/07/2021 04:42 AM    Chloride 111 (H) 03/07/2021 04:42 AM    CO2 25 03/07/2021 04:42 AM    Anion gap 6 03/07/2021 04:42 AM    Glucose 100 03/07/2021 04:42 AM    BUN 6 03/07/2021 04:42 AM    Creatinine 0.44 (L) 03/07/2021 04:42 AM    BUN/Creatinine ratio 14 03/07/2021 04:42 AM    GFR est AA >60 03/07/2021 04:42 AM    GFR est non-AA >60 03/07/2021 04:42 AM    Calcium 8.5 03/07/2021 04:42 AM    Bilirubin, total 0.4 03/07/2021 04:42 AM    Alk. phosphatase 90 03/07/2021 04:42 AM    Protein, total 6.2 (L) 03/07/2021 04:42 AM    Albumin 1.8 (L) 03/07/2021 04:42 AM    Globulin 4.4 (H) 03/07/2021 04:42 AM    A-G Ratio 0.4 (L) 03/07/2021 04:42 AM    ALT (SGPT) 18 03/07/2021 04:42 AM     No results displayed because visit has over 200 results. Vitals:    03/09/21 2353 03/10/21 0238 03/10/21 0335 03/10/21 0732   BP: 107/67  97/68 113/74   Pulse: (!) 124  (!) 107 (!) 118   Resp: 20  20 20   Temp: (!) 102.3 °F (39.1 °C) 98.6 °F (37 °C) 98.1 °F (36.7 °C) 99.1 °F (37.3 °C)   SpO2: 94%  99% 98%   Weight:   53.4 kg (117 lb 12.8 oz)    Height:         No results found for this or any previous visit (from the past 24 hour(s)). RADIOLOGY REPORTS:  Results from Hospital Encounter encounter on 02/28/21   XR CHEST PORT    Narrative INDICATION: . post line placement  Additional history:  COMPARISON: CT of the chest, 2/28/2021. LIMITATIONS: Portable technique. Mikie Brown FINDINGS: Single frontal view of the chest.   .  Lines/tubes/surgical: A right IJ approach catheter projects to terminate in the  distal SVC. Heart/mediastinum: Unremarkable. Lungs/pleura: Interstitial prominence. No visible pneumothorax. Additional Comments: None. .    Impression 1. A right IJ approach catheter projects to terminate in the distal SVC. 2. Interstitial prominence, nonspecific which may represent pulmonary vascular  congestion. Mri Brain Wo Cont    Result Date: 3/4/2021  EXAM:  MRI BRAIN WO CONT INDICATION:  septic emboli, TECHNIQUE: Sagittal T1, axial FLAIR, T2,T1 and gradient echo images as well as coronal T2 weighted images and axial diffusion weighted images of the head were obtained.  COMPARISON:  CT head 10/28/21 FINDINGS: There are multiple foci of T2 hyperintensity in the cerebral white matter predominantly periventricular and some possibly pericallosal in a nonspecific pattern. There is no definitive associated restricted diffusion in these foci although there is suggested minimal T2 shine through. There are several punctate foci of restricted diffusion in the right greater than left posterior frontal and parietal lobes near the gray-white junction. This is in a pattern which would be consistent with emboli in this patient with known cardiac valve vegetation. A few tiny punctate foci of hypointensity possible on gradient echo. SWI was not performed. Ventricular size and configuration are normal. No definite acute intracranial hemorrhage, mass or abnormal extra-axial fluid collections. Flow voids are present in vertebral basilar and carotid artery systems. Mild mucosal thickening paranasal sinuses. 1. Multiple punctate foci restricted diffusion and some associated T2 hyperintensity gray-white junction greater in the right hemisphere suggesting emboli. 2. Other multifocal white matter T2 hyperintensity may be more chronic and of uncertain etiology. Ct Head Wo Cont    Result Date: 2/28/2021  Axial images from the skull base to the vertex were obtained without the use of contrast. Bone windows were reviewed. Sagittal and coronal reformatted images were also reviewed. All CT scans at this facility are performed using dose reduction optimization techniques as appropriate to a performed exam including the following:   automated exposure control, adjustments of the mA and/or kV according to patient size, or use of iterative reconstruction technique. INDICATION: Headache. There is no evidence of hemorrhage. No mass or mass effect is seen. There is no acute ischemia. Ventricles, sulci and cisterns are intact. Normal gray matter/white matter differentiation. Bone windows show no acute abnormalities.  The visualized portions of the orbits, paranasal sinuses and mastoid air cells are unremarkable. Review of the reconstructed images show no additional findings. Negative CT of the brain. Cta Chest W Or W Wo Cont    Result Date: 2/28/2021  EXAM: CTA CHEST W OR W WO CONT INDICATION: eval for septic emboli COMPARISON: None. CONTRAST: 80 mL of Isovue-370. TECHNIQUE: Precontrast  images were obtained to localize the volume for acquisition. Multislice helical CT arteriography was performed from the diaphragm to the thoracic inlet during uneventful rapid bolus intravenous contrast administration. Lung and soft tissue windows were generated. Coronal and sagittal images were generated and 3D post processing consisting of coronal maximum intensity images was performed. CT dose reduction was achieved through use of a standardized protocol tailored for this examination and automatic exposure control for dose modulation. FINDINGS: The lungs are clear of mass, nodule, airspace disease or edema. There is opacification of peripheral lower lobe airways bilaterally. The pulmonary arteries are well enhanced and no pulmonary emboli are identified. There is no mediastinal or hilar adenopathy or mass. The aorta enhances normally without evidence of aneurysm or dissection. The visualized portions of the upper abdominal organs are normal.     No pulmonary process or septic emboli. Lower lobe branch bronchial opacification likely reflecting  secretions or aspiration. Ct Abd Pelv W Cont    Result Date: 3/2/2021  EXAM: CT ABD PELV W CONT INDICATION: diarrhea and severe sepsis COMPARISON: No comparisons CONTRAST: 100 mL of Isovue-370. TECHNIQUE: Following the uneventful intravenous administration of contrast, thin axial images were obtained through the abdomen and pelvis. Coronal and sagittal reconstructions were generated. Oral contrast was not administered.  CT dose reduction was achieved through use of a standardized protocol tailored for this examination and automatic exposure control for dose modulation. FINDINGS: Motion artifact limits evaluation LOWER THORAX: Left lower lobe airspace disease with a small left pleural effusion. LIVER: No mass. BILIARY TREE: Gallbladder is within normal limits. CBD is not dilated. SPLEEN: Borderline enlarged spleen PANCREAS: No mass or ductal dilatation. ADRENALS: Unremarkable. KIDNEYS: No mass, calculus, or hydronephrosis. STOMACH: Unremarkable. SMALL BOWEL: No dilatation or wall thickening. COLON: No dilatation or wall thickening. APPENDIX: Unremarkable PERITONEUM: Free fluid in the pelvis RETROPERITONEUM: No lymphadenopathy or aortic aneurysm. REPRODUCTIVE ORGANS: Unremarkable URINARY BLADDER: Distended urinary bladder BONES: No destructive bone lesion. ABDOMINAL WALL: No mass or hernia. ADDITIONAL COMMENTS: N/A     1. Limited by motion. 2.  Left lower lobe airspace disease and left effusion suspicious for pneumonia. 3.   Distended urinary bladder. 4.  Free fluid in the pelvis. No evidence of bowel obstruction or free intraperitoneal air    Xr Chest Port    Result Date: 3/3/2021  INDICATION: . post line placement Additional history: COMPARISON: CT of the chest, 2/28/2021. LIMITATIONS: Portable technique. Crystal Rita FINDINGS: Single frontal view of the chest. . Lines/tubes/surgical: A right IJ approach catheter projects to terminate in the distal SVC. Heart/mediastinum: Unremarkable. Lungs/pleura: Interstitial prominence. No visible pneumothorax. Additional Comments: None. .    1. A right IJ approach catheter projects to terminate in the distal SVC. 2. Interstitial prominence, nonspecific which may represent pulmonary vascular congestion. Echo Adult Complete    Result Date: 3/3/2021  · LV: Calculated LVEF is 71%. Normal cavity size, wall thickness, systolic function (ejection fraction normal) and diastolic function. Wall motion: normal. Normal left ventricular strain.  · Image quality for this study was suboptimal. · MV: Mitral valve non-specific thickening. Mild mitral valve prolapse of the posterior leaflet. Severe mitral valve regurgitation is present. Possible vegetation present on the posterior leaflet of the mitral valve. Posterior mitral leaflet vegetation is moderately sized. Vegetation is localized on the atrial aspect of the posterior mitral leaflet. PAST PSYCHIATRIC HISTORY:  She denies ever being admitted in a psychiatric facility. Denies ever being diagnosed with major depression or anxiety. She has a prolific history of opioid dependence as above. She is currently on Suboxone,      PSYCHOSOCIAL HISTORY:  She is single and has no children. MENTAL STATUS EXAMINATION:   General appearance: Francisco Nelson is a 31 y.o. WHITE female dressed in hospital apparel, psychomotor activity is decreased  Eye contact: Limited eye contact  Speech: Soft, spontaneous   Affect : blunt  Mood: \"ok\"  Thought Process: Logical  Perception: Denies AH or VH. Thought Content: denies SI or Plan  Insight: Partial  Judgement: Fair  Cognition: Intact grossly. ASSESSMENT AND PLANNING:  The patient meets the criteria for substance use mood disorder and opioid use disorder, severe. She appears sedated during the interview. She received a total of 6 mg IV Ativan just today. I highly discouraged benzodiazepine to be given for agitation or anxiety given prolific substance use history. I will take the liberty to discontinue it. Francisco Nelson appears to have capacity to make decisions at the present time and I do not see any condition that would preclude her from making decisions regarding her healthcare. I had a lengthy discussion with the patient. She is fully aware that she can potentially die if she leaves A. She is also fully aware that by staying in the hospital to receive all the medical treatments recommended by the primary team will will benefit her so she can get better.  I also spoke to her mother who is very supportive of her and will encourage her to stay. Patient so far is willing to stay and be compliant with the treatment. I have discussed her case with Gianluca Warner, and Dr. Tyrone Hamilton, the hospitalist.    Thank you for this kind consult.       BRIDGETTE BALLARD NP      [id number]  D:  03/09/2021 21:27  T:  03/10/2021 2:20  JOB #:  [id number]

## 2021-03-10 NOTE — PROGRESS NOTES
Problem: Falls - Risk of  Goal: *Absence of Falls  Description: Document Birdie Cortes Fall Risk and appropriate interventions in the flowsheet. Outcome: Not Progressing Towards Goal  Note: Fall Risk Interventions:  Mobility Interventions: Bed/chair exit alarm, Communicate number of staff needed for ambulation/transfer, Patient to call before getting OOB, PT Consult for mobility concerns    Mentation Interventions: Bed/chair exit alarm, Door open when patient unattended, Adequate sleep, hydration, pain control, Evaluate medications/consider consulting pharmacy, More frequent rounding, Room close to nurse's station, Toileting rounds    Medication Interventions: Bed/chair exit alarm, Patient to call before getting OOB, Teach patient to arise slowly    Elimination Interventions: Bed/chair exit alarm, Call light in reach, Patient to call for help with toileting needs              Problem: Patient Education: Go to Patient Education Activity  Goal: Patient/Family Education  Outcome: Not Progressing Towards Goal     Problem: Pressure Injury - Risk of  Goal: *Prevention of pressure injury  Description: Document Terarnce Scale and appropriate interventions in the flowsheet.   Outcome: Not Progressing Towards Goal  Note: Pressure Injury Interventions:  Sensory Interventions: Assess changes in LOC, Discuss PT/OT consult with provider, Keep linens dry and wrinkle-free, Maintain/enhance activity level, Minimize linen layers    Moisture Interventions: Absorbent underpads, Minimize layers    Activity Interventions: Assess need for specialty bed, Pressure redistribution bed/mattress(bed type)    Mobility Interventions: Assess need for specialty bed, Pressure redistribution bed/mattress (bed type)    Nutrition Interventions: Document food/fluid/supplement intake    Friction and Shear Interventions: Apply protective barrier, creams and emollients, Lift sheet, Minimize layers, Transferring/repositioning devices                Problem: Patient Education: Go to Patient Education Activity  Goal: Patient/Family Education  Outcome: Not Progressing Towards Goal     Problem: Pain  Goal: *Control of Pain  Outcome: Not Progressing Towards Goal  Goal: *PALLIATIVE CARE:  Alleviation of Pain  Outcome: Not Progressing Towards Goal     Problem: Patient Education: Go to Patient Education Activity  Goal: Patient/Family Education  Outcome: Not Progressing Towards Goal     Problem: Hypertension  Goal: *Blood pressure within specified parameters  Outcome: Not Progressing Towards Goal  Goal: *Fluid volume balance  Outcome: Not Progressing Towards Goal  Goal: *Labs within defined limits  Outcome: Not Progressing Towards Goal     Problem: Patient Education: Go to Patient Education Activity  Goal: Patient/Family Education  Outcome: Not Progressing Towards Goal     Problem: Anxiety  Goal: *Alleviation of anxiety  Outcome: Not Progressing Towards Goal  Goal: *Alleviation of anxiety (Palliative Care)  Outcome: Not Progressing Towards Goal     Problem: Patient Education: Go to Patient Education Activity  Goal: Patient/Family Education  Outcome: Not Progressing Towards Goal     Problem: Non-Violent Restraints  Goal: Removal from restraints as soon as assessed to be safe  Outcome: Not Progressing Towards Goal  Goal: No harm/injury to patient while restraints in use  Outcome: Not Progressing Towards Goal  Goal: Patient's dignity will be maintained  Outcome: Not Progressing Towards Goal  Goal: Patient Interventions  Outcome: Not Progressing Towards Goal     Problem: Risk for Spread of Infection  Goal: Prevent transmission of infectious organism to others  Description: Prevent the transmission of infectious organisms to other patients, staff members, and visitors.   Outcome: Not Progressing Towards Goal     Problem: Patient Education:  Go to Education Activity  Goal: Patient/Family Education  Outcome: Not Progressing Towards Goal

## 2021-03-10 NOTE — CONSULTS
Comprehensive Nutrition Assessment    Type and Reason for Visit: Initial, Consult, RD nutrition re-screen/LOS    Nutrition Recommendations/Plan:    1. Regular, vegetarian diet. DO to call for menu selections. Preferences updated. 2. Continue Ensure Enlive with meals  3. Snacks added  4. Biweekly standing weights  5. Continue daily MVI  6. Recommend checking iron profile and B12      Nutrition Assessment:     31 y.o. female with PMHx of aggressive outburst, Anxiety disorder, Hepatitis C, and IV drug abuse. 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 ID following. Noted MRI:  of brain suggesting emboli in R hemisphere. Anemia of unclear etiology. No labs checked since 3/7. Iron profile, B vitamins not checked. AMS on admission improving - pt groggy during conversation today, but able to answer questions appropriately. Mother present as well. Poor appetite, vegetarian. Struggling with menu. Unable to have food brought in from outside d/t concerns for inappropriate conduct earlier this admission. Pt denies recent wt loss. UBW around 120 lb. Appears thin/ frail, but no overt muscle/ fat loss apparent. Deferred NFPE at this time. Pt states she is hungry right now and requesting grilled cheese. Reviewed some vegetarian options and pt seemed open to them all. Stated she didn't want to be difficult. Provided printed vegetarian menu options. Will have snacks sent as well. Continue Ensure Enlive with all tray. Requested DO to call pt for menu selections. Malnutrition Assessment:  Malnutrition Status:   At risk for malnutrition (IVDU, thin/frail individual)    Context:  Chronic illness         Nutritionally Significant Medications:   Suboxone, Ancef, Haldol, Thera-M, Pepcid (d/c'd today)      Estimated Daily Nutrient Needs:  Energy (kcal): 0793-7008(MSJ x 1.3-1.5 or 30-35 kcal/kg)  Protein (g): 70-85(1.2-1.5 gm/kg)  Fluid (ml/day): 1 mL/kcal  **using standing scale wt of 55.9 kg**    Nutrition Related Findings:   Edema: none  Last BM: 3/7    Wounds:    None       Current Nutrition Therapies:  Diet: Regular, vegetarian  Supplements: Ensure Enlive TID with all meals  Meal intake: No data found. Anthropometric Measures:  · Height:  5' 6\" (167.6 cm)  · Current Body Wt:  53.4 kg (117 lb 11.6 oz)   · Admission Body Wt:  121 lb 12.8 oz(55.2 kg - bed)    · Usual Body Wt:  (~120 lb)     · Ideal Body Wt:  130 lbs:  90.6 %   · BMI Category:  Normal weight (BMI 18.5-24. 9)       Wt Readings from Last 20 Encounters:   03/10/21 53.4 kg (117 lb 12.8 oz) - bed   03/08/21 55.9 kg (123 lb 3.8 oz) - standing scale   03/04/21 51.7 kg (113 lb 15.7 oz) - no source   03/01/21 55.2 kg (121 lb 12.8 oz) - bed, 1st sourced wt   02/28/21 59 kg (130 lb) - no source       Nutrition Diagnosis:   · Inadequate oral intake related to cognitive or neurological impairment as evidenced by poor intake prior to admission(variable/ inconsistent PO intake)      Nutrition Interventions:   Food and/or Nutrient Delivery: Continue current diet, Continue oral nutrition supplement, Snacks (specify)  Nutrition Education and Counseling: No recommendations at this time  Coordination of Nutrition Care: Continue to monitor while inpatient    Goals:  pt will consume >/=50% of most meals and at least 1 ONS daily over next 7 days; stable wt +/- 1 kg       Nutrition Monitoring and Evaluation:   Behavioral-Environmental Outcomes: Beliefs and attitudes  Food/Nutrient Intake Outcomes: Food and nutrient intake, Supplement intake, Vitamin/mineral intake  Physical Signs/Symptoms Outcomes: Biochemical data, GI status, Meal time behavior, Nutrition focused physical findings, Weight    Discharge Planning:    Continue current diet     No results for input(s): GLU, BUN, CREA, NA, K, CL, CO2, CA, PHOS, MG in the last 72 hours.     No results for input(s): ALT, AP, TBIL, TBILI, TP, ALB, GLOB, GGT, AML, LPSE in the last 72 hours. No lab exists for component: SGOT, GPT, AMYP, HLPSE    No results for input(s): LAC in the last 72 hours. No results for input(s): WBC, HGB, HCT, PLT, HGBEXT, HCTEXT, PLTEXT, HGBEXT, HCTEXT, PLTEXT in the last 72 hours. No results for input(s): PREALB in the last 72 hours. No results for input(s): TRIGL in the last 72 hours. No results for input(s): GLUCPOC in the last 72 hours.     Lab Results   Component Value Date/Time    Hemoglobin A1c 5.2 03/07/2021 04:42 AM       Iron Profile  No results found for: IRON, TIBC, PSAT    No results found for: FERR    B Vitamins  No results found for: FOL, B12LT, VB6LT, VIB1LT    Zinc  No results found for: ZINCLT    Copper  No results found for: COSLT    Selenium  No results found for: SELNLT    Fat Soluble Vitamins    No results found for: VITALT, VITD3, VITE1T, VITEG, VIK1LT      Celine Do RD  Available via [employer]

## 2021-03-10 NOTE — PROGRESS NOTES
Paged the on call Hospitalist, patient febrile, not due for another dose of Tylenol, will not allow ice packs to be applied, cold compress, etc. Provider returned phone call. Orders provided for Motrin and PPI. Meds promptly given. Will continue to monitor.

## 2021-03-10 NOTE — PROGRESS NOTES
Bedside shift change report given to BELKIS REYES (oncoming nurse) by Bouchra Wren (offgoing nurse). Report included the following information SBAR, Kardex and ED Summary.

## 2021-03-10 NOTE — PROGRESS NOTES
Spiritual Care Assessment/Progress Note  [facility]      NAME: Charity Romberg      MRN: [id number]  AGE: 31 y.o. SEX: female  Worship Affiliation: No preference   Language: English     3/10/2021     Total Time (in minutes): 20     Spiritual Assessment begun in [facility] IMCU through conversation with:         []Patient        [] Family    [] Friend(s)        Reason for Consult: Advance medical directive consult, Initial/Spiritual assessment, patient floor     Spiritual beliefs: (Please include comment if needed)     [] Identifies with a warner tradition:         [] Supported by a warner community:            [] Claims no spiritual orientation:           [] Seeking spiritual identity:                [] Adheres to an individual form of spirituality:           [x] Not able to assess:                           Identified resources for coping:      [] Prayer                               [] Music                  [] Guided Imagery     [] Family/friends                 [] Pet visits     [] Devotional reading                         [x] Unknown     [] Other:                                              Interventions offered during this visit: (See comments for more details)                Plan of Care:     [] Support spiritual and/or cultural needs    [x] Support AMD and/or advance care planning process      [] Support grieving process   [] Coordinate Rites and/or Rituals    [] Coordination with community clergy   [] No spiritual needs identified at this time   [] Detailed Plan of Care below (See Comments)  [] Make referral to Music Therapy  [] Make referral to Pet Therapy     [] Make referral to Addiction services  [] Make referral to [facility]  [] Make referral to Spiritual Care Partner  [] No future visits requested        [x] Follow up upon further referrals     Comments:  visit for initial spiritual assessment and Advance Directive (AMD) consult.   Patient is under contact and droplet isolation precautions. Patient resting in bed, appears to be asleep and resting comfortably.  requested for Advance Directive (AMD) consult. Spoke to the nurse concerning AMD and patient's ability to complete AMD.  Due to patient's health and health history, Section C of AMD will need to be signed by the patient's physician or licensed clinical psychologist verifying that the patient is capable of making an informed decision and understands the consequences of the special londono given to the agent she chooses to appoint for her end of life care. Provided a blank copy of AMD, a copy of the booklet, \"Your Right to Decide,\" and information card informing patient and staff of availability of  including contact information for physician or licensed clinical psychologist signature. Asked staff to contact the  once Section C is signed and dated in order to assist the patient in completing and processing the AMD.  Please contact spiritual care for further referral or consult. Rev.  Colette Carreon MDiv, [facility], [facility]   paging service: [phone])

## 2021-03-10 NOTE — PROGRESS NOTES
Infectious Disease Progress Note          HPI  Ms Angel Vázquez seen earlier today  She said she wanted to sleep   Denied any particular pain, nausea, vomiting ,shortness of breath                 Physical Exam:    Vitals:   Patient Vitals for the past 24 hrs:   Temp Pulse Resp BP SpO2   03/10/21 1214 98.4 °F (36.9 °C) (!) 129 23 115/72 96 %   03/10/21 0732 99.1 °F (37.3 °C) (!) 118 20 113/74 98 %   03/10/21 0335 98.1 °F (36.7 °C) (!) 107 20 97/68 99 %   03/10/21 0238 98.6 °F (37 °C)       03/09/21 2353 (!) 102.3 °F (39.1 °C) (!) 124 20 107/67 94 %   03/09/21 2005     93 %   03/09/21 1933 99.6 °F (37.6 °C) (!) 126 21 109/67 93 %     · GEN NAD  · HEENT: no scleral icterus, no thrush, poor dentition  + R IJ  · CV: S1, S2 heard ,  · Lungs:  Clear anteriorly   · Abdomen: soft, non distended,   · Extremities: no edema noted   · Skin on rash on face, upper extremities seen   · MSK non tender to palpation of back cervical thoracic, lumbar spine  Labs:   No results found for this or any previous visit (from the past 24 hour(s)).     Microbiology Data:       Blood: 2/28/21  Specimen Information: Blood        Component Value Flag Ref Range Units Status   Special Requests:      Final   No Special Requests    Culture result: Abnormal       Final   Staphylococcus aureus growing in all 4 bottles drawn    Culture result:      Final   Gram pos cocci in clusters   growing in all 4 bottles drawn   CALLED TO AND READ BACK BY   Yesy Ruiz on 3.1.21 at 12:13 by tlw    Susceptibility    Staphylococcus aureus    Antibiotic Interpretation Value Method Comment   Clindamycin ($) Resistant Resistant ug/mL ANGELIKA    Ciprofloxacin ($) Susceptible <=0.5 ug/mL ANGELIKA    Daptomycin ($$$$$) Susceptible 0.25 ug/mL ANGELIKA    Doxycycline ($$) Susceptible <=0.5 ug/mL ANGELIKA    Erythromycin ($$$$) Resistant >=8 ug/mL ANGELIKA    Gentamicin ($) Susceptible <=0.5 ug/mL ANGELIKA    Levofloxacin ($) Susceptible 0.25 ug/mL ANGELIKA    Linezolid ($$$$$) Susceptible 2 ug/mL ANGELIKA Moxifloxacin ($$$$) Susceptible <=0.25 ug/mL ANGELIKA    Oxacillin Susceptible 0.5 ug/mL ANGELIKA    Rifampin ($$$$) Susceptible <=0.5 ug/mL ANGELIKA Rifampin is not to be used for mono-therapy. Tetracycline Susceptible <=1 ug/mL ANGELIKA    Trimeth/Sulfa Susceptible <=10 ug/mL ANGELIKA    Vancomycin ($) Susceptible 1 ug/mL ANGELIKA        Blood 3/4/21  Special Requests:      Preliminary   NO SPECIAL REQUESTS    Culture result: Abnormal       Preliminary   GRAM POSITIVE COCCI IN CLUSTERS GROWING IN 2 OF 4 BOTTLES DRAWN (SITE = L. AC AND R HAND)    Culture result:      Preliminary   REMAINING BOTTLE(S) HAS/HAVE NO GROWTH SO FAR      Blood 3/5/21  Specimen Information: Blood        Component Value Flag Ref Range Units Status   Special Requests:      Preliminary   NO SPECIAL REQUESTS    Culture result: Abnormal       Preliminary   GRAM POSITIVE COCCI IN CLUSTERS GROWING IN 1 OF 4 BOTTLES DRAWN . Rawleigh Billing Rawleigh Billing LEFT ARM SITE    Culture result:      Preliminary   REMAINING BOTTLE(S) HAS/HAVE NO GROWTH SO FAR                 Urine:  Special Requests:      Final   NO SPECIAL REQUESTS    Dexter Count >100,000   COLONIES/mL      Final   Culture result: Abnormal       Final   STAPHYLOCOCCUS AUREUS    Susceptibility    Staphylococcus aureus    Antibiotic Interpretation Value Method Comment   Ciprofloxacin ($) Susceptible <=0.5 ug/mL ANGELIKA    Gentamicin ($) Susceptible <=0.5 ug/mL ANGELIKA    Nitrofurantoin Susceptible <=16 ug/mL ANGELIKA    Oxacillin Susceptible 0.5 ug/mL ANGELIKA    Rifampin ($$$$) Susceptible <=0.5 ug/mL ANGELIKA Rifampin is not to be used for mono-therapy.    Tetracycline Susceptible <=1 ug/mL ANGELIKA    Trimeth/Sulfa Susceptible <=10 ug/mL ANGELIKA    Vancomycin ($) Susceptible 1 ug/mL ANGELIKA    Levofloxacin ($) Susceptible 0.5 ug/mL ANGELIKA    Linezolid ($$$$$) Susceptible 2 ug/mL ANGELIKA    Daptomycin ($$$$$) Susceptible 0.25 ug/mL ANGELIKA    Moxifloxacin ($$$$) Susceptible <=0.25 ug/mL ANGELIKA    Doxycycline ($$) Susceptible <=0.5 ug/mL ANGELIKA        Component Value Flag Ref Range Units Status [address] [place] ANTIGEN Negative   NEG   Final   C. difficile toxin Negative   NEG   Final   INTERPRETATION   NTXCD   Final   NEGATIVE FOR TOXIGENIC C. DIFFICILE                Imaging:   CT chest  FINDINGS:  The lungs are clear of mass, nodule, airspace disease or edema. There is  opacification of peripheral lower lobe airways bilaterally.     The pulmonary arteries are well enhanced and no pulmonary emboli are identified.     There is no mediastinal or hilar adenopathy or mass. The aorta enhances normally  without evidence of aneurysm or dissection.     The visualized portions of the upper abdominal organs are normal.     IMPRESSION  No pulmonary process or septic emboli. Lower lobe branch bronchial  opacification likely reflecting  secretions or aspiration. CT ABD  FINDINGS: Motion artifact limits evaluation  LOWER THORAX: Left lower lobe airspace disease with a small left pleural  effusion. LIVER: No mass. BILIARY TREE: Gallbladder is within normal limits. CBD is not dilated. SPLEEN: Borderline enlarged spleen  PANCREAS: No mass or ductal dilatation. ADRENALS: Unremarkable. KIDNEYS: No mass, calculus, or hydronephrosis. STOMACH: Unremarkable. SMALL BOWEL: No dilatation or wall thickening. COLON: No dilatation or wall thickening. APPENDIX: Unremarkable  PERITONEUM: Free fluid in the pelvis  RETROPERITONEUM: No lymphadenopathy or aortic aneurysm. REPRODUCTIVE ORGANS: Unremarkable  URINARY BLADDER: Distended urinary bladder  BONES: No destructive bone lesion. ABDOMINAL WALL: No mass or hernia. ADDITIONAL COMMENTS: N/A     IMPRESSION  1. Limited by motion.     2.  Left lower lobe airspace disease and left effusion suspicious for pneumonia.     3. Distended urinary bladder.     4. Free fluid in the pelvis. No evidence of bowel obstruction or free  intraperitoneal air    TTE  · LV: Calculated LVEF is 71%. Normal cavity size, wall thickness, systolic function (ejection fraction normal) and diastolic function. Wall motion: normal. Normal left ventricular strain.  · Image quality for this study was suboptimal.  · MV: Mitral valve non-specific thickening. Mild mitral valve prolapse of the posterior leaflet. Severe mitral valve regurgitation is present. Possible vegetation present on the posterior leaflet of the mitral valve. Posterior mitral leaflet vegetation is moderately sized. Vegetation is localized on the atrial aspect of the posterior mitral leaflet.      Echo Findings    Left Ventricle Normal cavity size, wall thickness, systolic function (ejection fraction normal) and diastolic function. The muscle mass is normal. The cavity shape is normal. Wall motion: normal. The calculated EF is 71%. End-systolic volume is normal. Normal left ventricular strain. Normal left ventricular diastolic pressure. End-diastolic volume is normal.   Left Atrium Normal cavity size.   Right Ventricle Normal cavity size, wall thickness and global systolic function.   Right Atrium Normal cavity size.   Aortic Valve Aortic valve not well visualized. No stenosis and no regurgitation.   Mitral Valve Mitral valve not well visualized. No stenosis. Mitral valve non-specific thickening. Mild mitral valve prolapse of the posterior leaflet. Severe regurgitation. The mitral regurgitant jet is eccentric. There is possible vegetation on the posterior leaflet of the mitral valve. Posterior leaflet vegetation is moderately sized. The vegetation is localized on the atrial aspect of the posterior leaflet.   Tricuspid Valve Normal valve structure, no stenosis and no regurgitation.   Pulmonic Valve Normal valve structure, no stenosis and no regurgitation.   Aorta Normal aortic root, ascending aortic, and aortic arch.   Pericardium Normal pericardium and no evidence of pericardial effusion       MRI     FINDINGS:  There are multiple foci of T2 hyperintensity in the cerebral white matter  predominantly periventricular and some possibly pericallosal in a  nonspecific  pattern. There is no definitive associated restricted diffusion in these foci  although there is suggested minimal T2 shine through. There are several punctate foci of restricted diffusion in the right greater  than left posterior frontal and parietal lobes near the gray-white junction. This is in a pattern which would be consistent with emboli in this patient with  known cardiac valve vegetation. A few tiny punctate foci of hypointensity  possible on gradient echo. SWI was not performed. Ventricular size and  configuration are normal. No definite acute intracranial hemorrhage, mass or  abnormal extra-axial fluid collections. Flow voids are present in vertebral  basilar and carotid artery systems. Mild mucosal thickening paranasal sinuses.     IMPRESSION  1. Multiple punctate foci restricted diffusion and some associated T2  hyperintensity gray-white junction greater in the right hemisphere suggesting  emboli. 2. Other multifocal white matter T2 hyperintensity may be more chronic and of  uncertain etiology. 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 Blood cx 3/7 negative.  Repeat blood cx if febrile or clinical worsening     Renally dosed Cefazolin with plan for 6 weeks from 3/7/21 in a monitored setting    Seen  by CTS ( KELLY per CTS)   MRI brain with \"  Multiple punctate foci restricted diffusion and some associated T2 hyperintensity gray-white junction greater in the right hemisphere suggesting emboli\"   Rhinovirus and Enterovirus detected on respiratory panel -- supportive care   Resources for addiction medicine per primary team          Thank for the opportunity to participate in the care of this patient. Please contact with questions or concerns.            Rylee Garza DO  12:05 PM

## 2021-03-10 NOTE — PROGRESS NOTES
[address] [facility] Adult  Hospitalist Group                                                                                          Hospitalist Progress Note  Sparkle Jarvis MD  Answering service: [phone] from in house phone        Date of Service:  3/10/2021  NAME:  Marylou Delgado  :  1990  MRN:  [id number]      Please note that this dictation was completed with [employer], the computer voice recognition software. Quite often unanticipated grammatical, syntax, homophones, and other interpretive errors are inadvertently transcribed by the computer software. Please disregard these errors. Please excuse any errors that have escaped final proofreading. Admission Summary:   Omero Blake is a 31 y.o.  female who presents with above symptoms. Started suddenly. Progressively worse. Patient with altered mental status not willing and unable to provide information. Mother reports she is adult but lives at her house. Not sure when she used any type of IV drugs last time. Possibly yesterday. Reports she has been dealing with IV drug abuse for many years. Apparently after an . Mother reports she has been multiple times in, due to narcotic overdose, reversed by nasal Narcan. She also has history of amphetamine use, unknown last use. Last 4 days unable to ambulate. She has been suffering from severe myalgia and fever occasional cough, negative for Covid no exposure. Mother reports she was negative as well. Patient and mother report, never had similar symptoms in the past.  She takes Suboxone and Xanax as needed for anxiety. Not have any other prescriptions.       Interval history / Subjective:   Patient seen and examined today.  Sleeping whole day possibly lingering effect of Ativan which is now discontinued uneventful day patient did not try to leave the hospital today   Patient spiked fever again will repeat blood culture last culture from 3 7 no growth to date seen by ID  Currently on cefazolin renal dosing need 6 weeks of IV antibiotics in monitored setting     Assessment & Plan:     Severe Sepsis due to staph aureus bacteremia POA, likely infective endocarditis  Staph aureus high grade bacteremia, leukocytosis, elevated CRP and tachycardia  Renally dosed Cefazolin with plan for 6 weeks from 3/7/21 in a monitored setting      IV drug abuser, per mother shot heroin a few days back    Started on vancomycin and Zosyn, changed to cefepime and Flagyl by ID  Now changed to cefazolin per ID     Repeat blood cultures remained positive, now improving from 3/7 negative so far   CT chest shows a may be possibility of some aspiration  Respiratory PCR negative including SARS    Echocardiogram shows a posterior mitral valve vegetation and severe MR.   CT surgery consulted, will continue to follow    MRI of the brain-  Multiple punctate foci restricted diffusion and some associated T2  hyperintensity gray-white junction greater in the right hemisphere suggesting  Emboli. Other multifocal white matter T2 hyperintensity may be more chronic and of  uncertain etiology. Prognosis remains poor due to persistent fever and bacteremia    Altered mental status, multifactorial now alert and awake  Encephalopathy from sepsis and withdrawal from drugs, heroin withdrawal and benzo withdrawal, and embolic events  CT of the head without any acute abnormality. MRI with Multiple punctate foci restricted diffusion and some associated T2  hyperintensity gray-white junction greater in the right hemisphere suggesting emboli.   Seen by Neurology  Acute stroke, septic embolism     Prognosis is poor  Remains at risk for recurrent septic emboli and severe stroke  Stopped Lovenox due to risk of hemorrhagic conversion    Worsening anemia  Etiology unclear, likely multifactorial, watch for GI bleed  Stopped Lovenox  PUD prophylaxis  Transfuse prn hgb 7.1    Possibility of her benzo withdrawal and heroin withdrawal and/or amphetamine overdose  Started patient back on Xanax and her Suboxone  Symptomatic treatment needed  IV Ativan as needed. Mental status remains tenuous    Hypokalemia  IV fluids with potassium  Resolved    Thrombocytopenia  Due to sepsis, improved, monitor    Diarrhea. C difficile negative  CT abdomen unremarkable  Monitor    Hypernatremia and hypokalemia  Due to free water loss from a diarrhea. Improving  Body mass index is 19.01 kg/m². History of hepatitis C-negative viral load HIV NR, VL pending   Rhinovirus and Enterovirus detected on respiratory panel -- supportive care     Code status: FULL  DVT prophylaxis:SCD    Care Plan discussed with: Patient/Family  Anticipated Disposition: Home w/Family  Anticipated Discharge: Greater than 48 hours     Communicated to [employer] awaiting response for [facility] Problems  Never Reviewed          Codes Class Noted POA    Cerebrovascular accident (CVA) due to embolism of precerebral artery ([facility] [address] ICD-10-CM: I63.10  ICD-9-CM: 434.11  3/6/2021 Unknown        Sepsis ([facility] [address] ICD-10-CM: A41.9  ICD-9-CM: 038.9, 995.91  2/28/2021 Unknown              Review of Systems:   A comprehensive review of systems was negative except for that written in the HPI. Vital Signs:    Last 24hrs VS reviewed since prior progress note. Most recent are:  Visit Vitals  /72 (BP 1 Location: Right upper arm, BP Patient Position: At rest)   Pulse (!) 129   Temp 98.4 °F (36.9 °C)   Resp 23   Ht 5' 6\" (1.676 m)   Wt 53.4 kg (117 lb 12.8 oz)   SpO2 96%   BMI 19.01 kg/m²       No intake or output data in the 24 hours ending 03/10/21 1406     Physical Examination:     I had a face to face encounter with this patient and independently examined them on 03/10/21 as outlined below:        Constitutional:  awake and talking today    ENT:  Oral mucosa moist, oropharynx benign. Resp:  CTA bilaterally. No wheezing/rhonchi/rales.  No accessory muscle use   CV:  Regular rhythm, normal rate, no murmurs, gallops, rubs    GI:  Soft, non distended, non tender. normoactive bowel sounds, no hepatosplenomegaly     Musculoskeletal:  Moving around in the bed    Neurologic: Alert and oriented             Data Review:    Review and/or order of clinical lab test      Labs:     No results for input(s): WBC, HGB, HCT, PLT, HGBEXT, HCTEXT, PLTEXT, HGBEXT, HCTEXT, PLTEXT in the last 72 hours. No results for input(s): NA, K, CL, CO2, BUN, CREA, GLU, CA, MG, PHOS, URICA in the last 72 hours. No results for input(s): ALT, AP, TBIL, TBILI, TP, ALB, GLOB, GGT, AML, LPSE in the last 72 hours. No lab exists for component: SGOT, GPT, AMYP, HLPSE  No results for input(s): INR, PTP, APTT, INREXT, INREXT in the last 72 hours. No results for input(s): FE, TIBC, PSAT, FERR in the last 72 hours. No results found for: FOL, RBCF   No results for input(s): PH, PCO2, PO2 in the last 72 hours. No results for input(s): CPK, CKNDX, TROIQ in the last 72 hours.     No lab exists for component: CPKMB  Lab Results   Component Value Date/Time    Cholesterol, total 74 03/07/2021 04:42 AM    HDL Cholesterol 21 03/07/2021 04:42 AM    LDL, calculated 38.6 03/07/2021 04:42 AM    Triglyceride 72 03/07/2021 04:42 AM    CHOL/HDL Ratio 3.5 03/07/2021 04:42 AM     Lab Results   Component Value Date/Time    Glucose (POC) 132 (H) 03/03/2021 05:27 AM    Glucose (POC) 118 (H) 03/03/2021 12:18 AM     Lab Results   Component Value Date/Time    Color DARK YELLOW 02/28/2021 06:12 PM    Appearance CLEAR 02/28/2021 06:12 PM    Specific gravity 1.020 02/28/2021 06:12 PM    Specific gravity 1.016 02/28/2021 06:00 AM    pH (UA) 6.5 02/28/2021 06:12 PM    Protein 100 (A) 02/28/2021 06:12 PM    Glucose Negative 02/28/2021 06:12 PM    Ketone 15 (A) 02/28/2021 06:12 PM    Bilirubin Negative 02/28/2021 06:00 AM    Urobilinogen 2.0 (H) 02/28/2021 06:12 PM    Nitrites Positive (A) 02/28/2021 06:12 PM    Leukocyte Esterase MODERATE (A) 02/28/2021 06:12 PM    Epithelial cells FEW 02/28/2021 06:12 PM    Bacteria 1+ (A) 02/28/2021 06:12 PM    WBC 20-50 02/28/2021 06:12 PM    RBC >100 (H) 02/28/2021 06:12 PM         Medications Reviewed:     Current Facility-Administered Medications   Medication Dose Route Frequency    acetaminophen (TYLENOL) tablet 650 mg  650 mg Oral Q4H PRN    Or    acetaminophen (TYLENOL) suppository 650 mg  650 mg Rectal Q4H PRN    ibuprofen (MOTRIN) tablet 600 mg  600 mg Oral Q6H PRN    haloperidol lactate (HALDOL) injection 5 mg  5 mg IntraMUSCular Q4H PRN    multivitamin, tx-iron-ca-min (THERA-M w/ IRON) tablet 1 Tab  1 Tab Oral DAILY    ceFAZolin (ANCEF) 2 g in sterile water (preservative free) 20 mL IV syringe  2 g IntraVENous Q8H    melatonin tablet 3 mg  3 mg Oral QHS PRN    nicotine (NICODERM CQ) 21 mg/24 hr patch 1 Patch  1 Patch TransDERmal DAILY    influenza vaccine 2020-21 (6 mos+)(PF) (FLUARIX/FLULAVAL/FLUZONE QUAD) injection 0.5 mL  0.5 mL IntraMUSCular PRIOR TO DISCHARGE    buprenorphine-naloxone (SUBOXONE) 8-2mg SL tablet  1 Tab SubLINGual BID    sodium chloride (NS) flush 5-40 mL  5-40 mL IntraVENous Q8H    sodium chloride (NS) flush 5-40 mL  5-40 mL IntraVENous PRN    polyethylene glycol (MIRALAX) packet 17 g  17 g Oral DAILY PRN    promethazine (PHENERGAN) tablet 12.5 mg  12.5 mg Oral Q6H PRN    Or    ondansetron (ZOFRAN) injection 4 mg  4 mg IntraVENous Q6H PRN     ______________________________________________________________________  EXPECTED LENGTH OF STAY: 4d 19h  ACTUAL LENGTH OF STAY:          10                 Bertrand Watson MD

## 2021-03-11 LAB
ANION GAP SERPL CALC-SCNC: 3 MMOL/L (ref 5–15)
BACTERIA SPEC CULT: ABNORMAL
BACTERIA SPEC CULT: ABNORMAL
BUN SERPL-MCNC: 9 MG/DL (ref 6–20)
BUN/CREAT SERPL: 19 (ref 12–20)
CALCIUM SERPL-MCNC: 9.2 MG/DL (ref 8.5–10.1)
CHLORIDE SERPL-SCNC: 99 MMOL/L (ref 97–108)
CO2 SERPL-SCNC: 30 MMOL/L (ref 21–32)
CREAT SERPL-MCNC: 0.48 MG/DL (ref 0.55–1.02)
ERYTHROCYTE [DISTWIDTH] IN BLOOD BY AUTOMATED COUNT: 13.4 % (ref 11.5–14.5)
GLUCOSE SERPL-MCNC: 105 MG/DL (ref 65–100)
HCT VFR BLD AUTO: 23.8 % (ref 35–47)
HGB BLD-MCNC: 7.6 G/DL (ref 11.5–16)
MCH RBC QN AUTO: 29 PG (ref 26–34)
MCHC RBC AUTO-ENTMCNC: 31.9 G/DL (ref 30–36.5)
MCV RBC AUTO: 90.8 FL (ref 80–99)
NRBC # BLD: 0 K/UL (ref 0–0.01)
NRBC BLD-RTO: 0 PER 100 WBC
PLATELET # BLD AUTO: 493 K/UL (ref 150–400)
PMV BLD AUTO: 9 FL (ref 8.9–12.9)
POTASSIUM SERPL-SCNC: 4.1 MMOL/L (ref 3.5–5.1)
RBC # BLD AUTO: 2.62 M/UL (ref 3.8–5.2)
SERVICE CMNT-IMP: ABNORMAL
SODIUM SERPL-SCNC: 132 MMOL/L (ref 136–145)
WBC # BLD AUTO: 7.9 K/UL (ref 3.6–11)

## 2021-03-11 PROCEDURE — 99232 SBSQ HOSP IP/OBS MODERATE 35: CPT | Performed by: INTERNAL MEDICINE

## 2021-03-11 PROCEDURE — 36415 COLL VENOUS BLD VENIPUNCTURE: CPT

## 2021-03-11 PROCEDURE — 74011250636 HC RX REV CODE- 250/636: Performed by: INTERNAL MEDICINE

## 2021-03-11 PROCEDURE — 65660000001 HC RM ICU INTERMED STEPDOWN

## 2021-03-11 PROCEDURE — 74011250637 HC RX REV CODE- 250/637: Performed by: NURSE PRACTITIONER

## 2021-03-11 PROCEDURE — 80048 BASIC METABOLIC PNL TOTAL CA: CPT

## 2021-03-11 PROCEDURE — 74011000250 HC RX REV CODE- 250: Performed by: INTERNAL MEDICINE

## 2021-03-11 PROCEDURE — 74011250637 HC RX REV CODE- 250/637: Performed by: INTERNAL MEDICINE

## 2021-03-11 PROCEDURE — 74011250637 HC RX REV CODE- 250/637: Performed by: FAMILY MEDICINE

## 2021-03-11 PROCEDURE — 85027 COMPLETE CBC AUTOMATED: CPT

## 2021-03-11 RX ADMIN — MULTIPLE VITAMINS W/ MINERALS TAB 1 TABLET: TAB at 09:56

## 2021-03-11 RX ADMIN — WATER 2 G: 1 INJECTION INTRAMUSCULAR; INTRAVENOUS; SUBCUTANEOUS at 13:01

## 2021-03-11 RX ADMIN — HALOPERIDOL LACTATE 5 MG: 5 INJECTION, SOLUTION INTRAMUSCULAR at 20:15

## 2021-03-11 RX ADMIN — BUPRENORPHINE HYDROCHLORIDE AND NALOXONE HYDROCHLORIDE DIHYDRATE 1 TABLET: 8; 2 TABLET SUBLINGUAL at 17:24

## 2021-03-11 RX ADMIN — ACETAMINOPHEN 650 MG: 325 TABLET ORAL at 09:56

## 2021-03-11 RX ADMIN — PROMETHAZINE HYDROCHLORIDE 12.5 MG: 25 TABLET ORAL at 09:56

## 2021-03-11 RX ADMIN — Medication 10 ML: at 22:21

## 2021-03-11 RX ADMIN — BUPRENORPHINE HYDROCHLORIDE AND NALOXONE HYDROCHLORIDE DIHYDRATE 1 TABLET: 8; 2 TABLET SUBLINGUAL at 09:56

## 2021-03-11 RX ADMIN — Medication 10 ML: at 13:01

## 2021-03-11 RX ADMIN — Medication 10 ML: at 05:55

## 2021-03-11 RX ADMIN — HALOPERIDOL LACTATE 5 MG: 5 INJECTION, SOLUTION INTRAMUSCULAR at 07:44

## 2021-03-11 RX ADMIN — ACETAMINOPHEN 650 MG: 325 TABLET ORAL at 20:15

## 2021-03-11 RX ADMIN — WATER 2 G: 1 INJECTION INTRAMUSCULAR; INTRAVENOUS; SUBCUTANEOUS at 05:54

## 2021-03-11 RX ADMIN — WATER 2 G: 1 INJECTION INTRAMUSCULAR; INTRAVENOUS; SUBCUTANEOUS at 22:20

## 2021-03-11 NOTE — PROGRESS NOTES
Infectious Disease Progress Note          HPI  Ms Shana Leader seen earlier today  Says feels a lot better   Slept well last night per patient   Says soreness all over body is improved and feels better   Denied any particular pain, nausea, vomiting ,shortness of breath                 Physical Exam:    Vitals:   Patient Vitals for the past 24 hrs:   Temp Pulse Resp BP SpO2   03/11/21 1104 98.8 °F (37.1 °C) (!) 101 20 104/65 98 %   03/11/21 0715 97.6 °F (36.4 °C) (!) 114 19 102/70 98 %   03/11/21 0344 98.9 °F (37.2 °C) (!) 122 20 (!) 109/53 95 %   03/10/21 2338 98.1 °F (36.7 °C) (!) 106 11 108/75 98 %   03/10/21 2000 99.9 °F (37.7 °C) (!) 129 18 (!) 99/52 100 %   03/10/21 1540 99.2 °F (37.3 °C) (!) 131 24 100/72 94 %   03/10/21 1214 98.4 °F (36.9 °C) (!) 129 23 115/72 96 %     · GEN NAD  · HEENT: no scleral icterus, no thrush, poor dentition  + R IJ  · CV: S1, S2 heard ,  · Lungs:  Clear bilaterally   · Abdomen: soft, non distended, non tender  · Extremities: no edema noted   · Skin no rash  ·   Labs:   Recent Results (from the past 24 hour(s))   CBC W/O DIFF    Collection Time: 03/11/21  3:55 AM   Result Value Ref Range    WBC 7.9 3.6 - 11.0 K/uL    RBC 2.62 (L) 3.80 - 5.20 M/uL    HGB 7.6 (L) 11.5 - 16.0 g/dL    HCT 23.8 (L) 35.0 - 47.0 %    MCV 90.8 80.0 - 99.0 FL    MCH 29.0 26.0 - 34.0 PG    MCHC 31.9 30.0 - 36.5 g/dL    RDW 13.4 11.5 - 14.5 %    PLATELET 424 (H) 289 - 400 K/uL    MPV 9.0 8.9 - 12.9 FL    NRBC 0.0 0  WBC    ABSOLUTE NRBC 0.00 0.00 - 2.68 K/uL   METABOLIC PANEL, BASIC    Collection Time: 03/11/21  3:55 AM   Result Value Ref Range    Sodium 132 (L) 136 - 145 mmol/L    Potassium 4.1 3.5 - 5.1 mmol/L    Chloride 99 97 - 108 mmol/L    CO2 30 21 - 32 mmol/L    Anion gap 3 (L) 5 - 15 mmol/L    Glucose 105 (H) 65 - 100 mg/dL    BUN 9 6 - 20 MG/DL    Creatinine 0.48 (L) 0.55 - 1.02 MG/DL    BUN/Creatinine ratio 19 12 - 20      GFR est AA >60 >60 ml/min/1.73m2    GFR est non-AA >60 >60 ml/min/1.73m2 Calcium 9.2 8.5 - 10.1 MG/DL       Microbiology Data:       Blood: 2/28/21  Specimen Information: Blood        Component Value Flag Ref Range Units Status   Special Requests:      Final   No Special Requests    Culture result: Abnormal       Final   Staphylococcus aureus growing in all 4 bottles drawn    Culture result:      Final   Gram pos cocci in clusters   growing in all 4 bottles drawn   CALLED TO AND READ BACK BY   Yesy Farmer on 3.1.21 at 12:13 by [email or web address]    Susceptibility    Staphylococcus aureus    Antibiotic Interpretation Value Method Comment   Clindamycin ($) Resistant Resistant ug/mL ANGELIKA    Ciprofloxacin ($) Susceptible <=0.5 ug/mL ANGELIKA    Daptomycin ($$$$$) Susceptible 0.25 ug/mL ANGELIKA    Doxycycline ($$) Susceptible <=0.5 ug/mL ANGELIKA    Erythromycin ($$$$) Resistant >=8 ug/mL ANGELIKA    Gentamicin ($) Susceptible <=0.5 ug/mL ANGELIKA    Levofloxacin ($) Susceptible 0.25 ug/mL ANGELIKA    Linezolid ($$$$$) Susceptible 2 ug/mL ANGELIKA    Moxifloxacin ($$$$) Susceptible <=0.25 ug/mL ANGELIKA    Oxacillin Susceptible 0.5 ug/mL ANGELIKA    Rifampin ($$$$) Susceptible <=0.5 ug/mL ANGELIKA Rifampin is not to be used for mono-therapy. Tetracycline Susceptible <=1 ug/mL ANGELIKA    Trimeth/Sulfa Susceptible <=10 ug/mL ANGELIKA    Vancomycin ($) Susceptible 1 ug/mL ANGELIKA        Blood 3/4/21  Special Requests:      Preliminary   NO SPECIAL REQUESTS    Culture result: Abnormal       Preliminary   GRAM POSITIVE COCCI IN CLUSTERS GROWING IN 2 OF 4 BOTTLES DRAWN (SITE = L. AC AND R HAND)    Culture result:      Preliminary   REMAINING BOTTLE(S) HAS/HAVE NO GROWTH SO FAR      Blood 3/5/21  Specimen Information: Blood        Component Value Flag Ref Range Units Status   Special Requests:      Preliminary   NO SPECIAL REQUESTS    Culture result: Abnormal       Preliminary   GRAM POSITIVE COCCI IN CLUSTERS GROWING IN 1 OF 4 BOTTLES DRAWN . Willi Mendez LEFT ARM SITE    Culture result:      Preliminary   REMAINING BOTTLE(S) HAS/HAVE NO GROWTH SO FAR           Blood 3/7/21  Component Value Ref Range & Units Status   Special Requests: NO SPECIAL REQUESTS    Preliminary   Culture result: NO GROWTH 3 DAYS    Preliminary   Result History          Urine:  Special Requests:      Final   NO SPECIAL REQUESTS    Sutton Count >100,000   COLONIES/mL      Final   Culture result: Abnormal       Final   STAPHYLOCOCCUS AUREUS    Susceptibility    Staphylococcus aureus    Antibiotic Interpretation Value Method Comment   Ciprofloxacin ($) Susceptible <=0.5 ug/mL ANGELIKA    Gentamicin ($) Susceptible <=0.5 ug/mL ANGELIKA    Nitrofurantoin Susceptible <=16 ug/mL ANGELIKA    Oxacillin Susceptible 0.5 ug/mL ANGELIKA    Rifampin ($$$$) Susceptible <=0.5 ug/mL ANGELIKA Rifampin is not to be used for mono-therapy. Tetracycline Susceptible <=1 ug/mL ANGELIKA    Trimeth/Sulfa Susceptible <=10 ug/mL ANGELIKA    Vancomycin ($) Susceptible 1 ug/mL ANGELIKA    Levofloxacin ($) Susceptible 0.5 ug/mL ANGELIKA    Linezolid ($$$$$) Susceptible 2 ug/mL ANGELIKA    Daptomycin ($$$$$) Susceptible 0.25 ug/mL ANGELIKA    Moxifloxacin ($$$$) Susceptible <=0.25 ug/mL ANGELIKA    Doxycycline ($$) Susceptible <=0.5 ug/mL ANGELIKA        Component Value Flag Ref Range Units Status   GDH ANTIGEN Negative   NEG   Final   C. difficile toxin Negative   NEG   Final   INTERPRETATION   NTXCD   Final   NEGATIVE FOR TOXIGENIC C. DIFFICILE                Imaging:   CT chest  FINDINGS:  The lungs are clear of mass, nodule, airspace disease or edema. There is  opacification of peripheral lower lobe airways bilaterally.     The pulmonary arteries are well enhanced and no pulmonary emboli are identified.     There is no mediastinal or hilar adenopathy or mass. The aorta enhances normally  without evidence of aneurysm or dissection.     The visualized portions of the upper abdominal organs are normal.     IMPRESSION  No pulmonary process or septic emboli. Lower lobe branch bronchial  opacification likely reflecting  secretions or aspiration.     CT ABD  FINDINGS: Motion artifact limits evaluation  LOWER THORAX: Left lower lobe airspace disease with a small left pleural  effusion. LIVER: No mass. BILIARY TREE: Gallbladder is within normal limits. CBD is not dilated. SPLEEN: Borderline enlarged spleen  PANCREAS: No mass or ductal dilatation. ADRENALS: Unremarkable. KIDNEYS: No mass, calculus, or hydronephrosis. STOMACH: Unremarkable. SMALL BOWEL: No dilatation or wall thickening. COLON: No dilatation or wall thickening. APPENDIX: Unremarkable  PERITONEUM: Free fluid in the pelvis  RETROPERITONEUM: No lymphadenopathy or aortic aneurysm. REPRODUCTIVE ORGANS: Unremarkable  URINARY BLADDER: Distended urinary bladder  BONES: No destructive bone lesion. ABDOMINAL WALL: No mass or hernia. ADDITIONAL COMMENTS: N/A     IMPRESSION  1. Limited by motion.     2.  Left lower lobe airspace disease and left effusion suspicious for pneumonia.     3. Distended urinary bladder.     4. Free fluid in the pelvis. No evidence of bowel obstruction or free  intraperitoneal air    TTE  · LV: Calculated LVEF is 71%. Normal cavity size, wall thickness, systolic function (ejection fraction normal) and diastolic function. Wall motion: normal. Normal left ventricular strain. · Image quality for this study was suboptimal.  · MV: Mitral valve non-specific thickening. Mild mitral valve prolapse of the posterior leaflet. Severe mitral valve regurgitation is present. Possible vegetation present on the posterior leaflet of the mitral valve. Posterior mitral leaflet vegetation is moderately sized. Vegetation is localized on the atrial aspect of the posterior mitral leaflet. Echo Findings    Left Ventricle Normal cavity size, wall thickness, systolic function (ejection fraction normal) and diastolic function. The muscle mass is normal. The cavity shape is normal. Wall motion: normal. The calculated EF is 71%. End-systolic volume is normal. Normal left ventricular strain. Normal left ventricular diastolic pressure. End-diastolic volume is normal.   Left Atrium Normal cavity size. Right Ventricle Normal cavity size, wall thickness and global systolic function. Right Atrium Normal cavity size. Aortic Valve Aortic valve not well visualized. No stenosis and no regurgitation. Mitral Valve Mitral valve not well visualized. No stenosis. Mitral valve non-specific thickening. Mild mitral valve prolapse of the posterior leaflet. Severe regurgitation. The mitral regurgitant jet is eccentric. There is possible vegetation on the posterior leaflet of the mitral valve. Posterior leaflet vegetation is moderately sized. The vegetation is localized on the atrial aspect of the posterior leaflet. Tricuspid Valve Normal valve structure, no stenosis and no regurgitation. Pulmonic Valve Normal valve structure, no stenosis and no regurgitation. Aorta Normal aortic root, ascending aortic, and aortic arch. Pericardium Normal pericardium and no evidence of pericardial effusion       MRI     FINDINGS:  There are multiple foci of T2 hyperintensity in the cerebral white matter  predominantly periventricular and some possibly pericallosal in a nonspecific  pattern. There is no definitive associated restricted diffusion in these foci  although there is suggested minimal T2 shine through. There are several punctate foci of restricted diffusion in the right greater  than left posterior frontal and parietal lobes near the gray-white junction. This is in a pattern which would be consistent with emboli in this patient with  known cardiac valve vegetation. A few tiny punctate foci of hypointensity  possible on gradient echo. SWI was not performed. Ventricular size and  configuration are normal. No definite acute intracranial hemorrhage, mass or  abnormal extra-axial fluid collections. Flow voids are present in vertebral  basilar and carotid artery systems. Mild mucosal thickening paranasal sinuses.     IMPRESSION  1.  Multiple punctate foci restricted diffusion and some associated T2  hyperintensity gray-white junction greater in the right hemisphere suggesting  emboli. 2. Other multifocal white matter T2 hyperintensity may be more chronic and of  uncertain etiology. 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 Blood cx 3/7 negative. Repeat blood cx if febrile or clinical worsening     Renally dosed Cefazolin with plan for 6 weeks till  4/18/21 in a monitored setting    Seen  by CTS ( KELLY per CTS)   MRI brain with \"  Multiple punctate foci restricted diffusion and some associated T2 hyperintensity gray-white junction greater in the right hemisphere suggesting emboli\"   Rhinovirus and Enterovirus detected on respiratory panel -- supportive care   Resources for addiction medicine per primary team          Thank for the opportunity to participate in the care of this patient. Please contact with questions or concerns.            Rylee Garza DO  11:17 AM

## 2021-03-11 NOTE — PROGRESS NOTES
[id number]  Hospitalist Group                                                                                          Hospitalist Progress Note  Reanna Hughes MD  Answering service: [phone] OR 36 from in house phone        Date of Service:  3/11/2021  NAME:  Cristela Bernard  :  1990  MRN:  [id number]      Please note that this dictation was completed with [employer], the computer voice recognition software. Quite often unanticipated grammatical, syntax, homophones, and other interpretive errors are inadvertently transcribed by the computer software. Please disregard these errors. Please excuse any errors that have escaped final proofreading. Admission Summary:   . Patient with altered mental status not willing and unable to provide information. Mother reports she is adult but lives at her house. Not sure when she used any type of IV drugs last time. Possibly yesterday. Reports she has been dealing with IV drug abuse for many years. Apparently after an . Mother reports she has been multiple times in, due to narcotic overdose, reversed by nasal Narcan. She also has history of amphetamine use, unknown last use. Last 4 days unable to ambulate. She has been suffering from severe myalgia and fever occasional cough, negative for Covid no exposure. Mother reports she was negative as well. Patient and mother report, never had similar symptoms in the past.  She takes Suboxone and Xanax as needed for anxiety.   Not have any other prescriptions.       Interval history / Subjective:   Patient alert and awake, appears to be coherent, asking questions about when she can she go home, denies any complaints or problems  Afebrile this morning     Assessment & Plan:     Severe Sepsis due to staph aureus bacteremia POA, likely infective endocarditis  Staph aureus high grade bacteremia, leukocytosis, elevated CRP and tachycardia  Renally dosed Cefazolin with plan for 6 weeks from 3/7/21 in a monitored setting    IV drug abuser, per mother shot heroin a few days back  Started on vancomycin and Zosyn, changed to cefepime and Flagyl by ID  Now changed to cefazolin per ID   Repeat blood cultures remained positive, blood culture from 3/7/2021 NG x3 days  CT chest shows a may be possibility of some aspiration  Respiratory PCR negative including SARS    Echocardiogram shows a posterior mitral valve vegetation and severe MR.   CT surgery consulted, will continue to follow    MRI of the brain-  Multiple punctate foci restricted diffusion and some associated T2  hyperintensity gray-white junction greater in the right hemisphere suggesting  Emboli. Other multifocal white matter T2 hyperintensity may be more chronic and of  uncertain etiology. We will get neurology consult    Altered mental status, multifactorial now alert and awake  Encephalopathy from sepsis and withdrawal from drugs, heroin withdrawal and benzo withdrawal, and embolic events  CT of the head without any acute abnormality. MRI with Multiple punctate foci restricted diffusion and some associated T2  hyperintensity gray-white junction greater in the right hemisphere suggesting emboli. Seen by Neurology  Acute stroke, septic embolism     Prognosis is poor  Remains at risk for recurrent septic emboli and severe stroke  Stopped Lovenox due to risk of hemorrhagic conversion    Worsening anemia  Etiology unclear, likely multifactorial, watch for GI bleed  Stopped Lovenox  PUD prophylaxis  Stool for occult blood  Transfuse prn hgb 7.1    Possibility of her benzo withdrawal and heroin withdrawal and/or amphetamine overdose  Started patient back on Xanax and her Suboxone  Symptomatic treatment needed  IV Ativan as needed. Mental status  appears to be improving    Hypokalemia  IV fluids with potassium  Resolved    Thrombocytopenia now with thrombocytosis  Monitor, likely reactive    Diarrhea.   C difficile negative  CT abdomen unremarkable  Monitor    Hypernatremia   Due to free water loss from a diarrhea. Improving  Body mass index is 17.61 kg/m². History of hepatitis C-negative viral load HIV NR, VL pending   Rhinovirus and Enterovirus detected on respiratory panel -- supportive care     Code status: FULL  DVT prophylaxis:SCD    Care Plan discussed with: Patient/Family  Anticipated Disposition: Home w/Family  Anticipated Discharge: Greater than 48 hours     Communicated to [employer] awaiting response for [facility]     Hospital Problems  Never Reviewed          Codes Class Noted POA    Cerebrovascular accident (CVA) due to embolism of precerebral artery ([facility] [address] ICD-10-CM: I63.10  ICD-9-CM: 434.11  3/6/2021 Unknown        Sepsis ([facility] [address] ICD-10-CM: A41.9  ICD-9-CM: 038.9, 995.91  2/28/2021 Unknown              Review of Systems:   A comprehensive review of systems was negative except for that written in the HPI. Vital Signs:    Last 24hrs VS reviewed since prior progress note. Most recent are:  Visit Vitals  /70 (BP 1 Location: Right upper arm, BP Patient Position: At rest)   Pulse (!) 114   Temp 97.6 °F (36.4 °C)   Resp 19   Ht 5' 6\" (1.676 m)   Wt 49.5 kg (109 lb 2 oz)   SpO2 98%   BMI 17.61 kg/m²         Intake/Output Summary (Last 24 hours) at 3/11/2021 0941  Last data filed at 3/11/2021 0344  Gross per 24 hour   Intake 180 ml   Output    Net 180 ml        Physical Examination:     I had a face to face encounter with this patient and independently examined them on 03/11/21 as outlined below:        Constitutional:  X3, awake   ENT:  Oral mucosa moist, oropharynx benign. Resp:  CTA bilaterally. No wheezing/rhonchi/rales. No accessory muscle use   CV:  Regular rhythm, normal rate, 2 /6 systolic murmur heard at the right upper sternal border    GI:  Soft, non distended, non tender.  normoactive bowel sounds, no hepatosplenomegaly     Musculoskeletal:  Moving around in the bed    Neurologic: Alert and oriented Data Review:    Review and/or order of clinical lab test      Labs:     Recent Labs     03/11/21 0355   WBC 7.9   HGB 7.6*   HCT 23.8*   *     Recent Labs     03/11/21 0355   *   K 4.1   CL 99   CO2 30   BUN 9   CREA 0.48*   *   CA 9.2     No results for input(s): ALT, AP, TBIL, TBILI, TP, ALB, GLOB, GGT, AML, LPSE in the last 72 hours. No lab exists for component: SGOT, GPT, AMYP, HLPSE  No results for input(s): INR, PTP, APTT, INREXT, INREXT in the last 72 hours. No results for input(s): FE, TIBC, PSAT, FERR in the last 72 hours. No results found for: FOL, RBCF   No results for input(s): PH, PCO2, PO2 in the last 72 hours. No results for input(s): CPK, CKNDX, TROIQ in the last 72 hours.     No lab exists for component: CPKMB  Lab Results   Component Value Date/Time    Cholesterol, total 74 03/07/2021 04:42 AM    HDL Cholesterol 21 03/07/2021 04:42 AM    LDL, calculated 38.6 03/07/2021 04:42 AM    Triglyceride 72 03/07/2021 04:42 AM    CHOL/HDL Ratio 3.5 03/07/2021 04:42 AM     Lab Results   Component Value Date/Time    Glucose (POC) 132 (H) 03/03/2021 05:27 AM    Glucose (POC) 118 (H) 03/03/2021 12:18 AM     Lab Results   Component Value Date/Time    Color DARK YELLOW 02/28/2021 06:12 PM    Appearance CLEAR 02/28/2021 06:12 PM    Specific gravity 1.020 02/28/2021 06:12 PM    Specific gravity 1.016 02/28/2021 06:00 AM    pH (UA) 6.5 02/28/2021 06:12 PM    Protein 100 (A) 02/28/2021 06:12 PM    Glucose Negative 02/28/2021 06:12 PM    Ketone 15 (A) 02/28/2021 06:12 PM    Bilirubin Negative 02/28/2021 06:00 AM    Urobilinogen 2.0 (H) 02/28/2021 06:12 PM    Nitrites Positive (A) 02/28/2021 06:12 PM    Leukocyte Esterase MODERATE (A) 02/28/2021 06:12 PM    Epithelial cells FEW 02/28/2021 06:12 PM    Bacteria 1+ (A) 02/28/2021 06:12 PM    WBC 20-50 02/28/2021 06:12 PM    RBC >100 (H) 02/28/2021 06:12 PM         Medications Reviewed:     Current Facility-Administered Medications Medication Dose Route Frequency    acetaminophen (TYLENOL) tablet 650 mg  650 mg Oral Q4H PRN    Or    acetaminophen (TYLENOL) suppository 650 mg  650 mg Rectal Q4H PRN    ibuprofen (MOTRIN) tablet 600 mg  600 mg Oral Q6H PRN    haloperidol lactate (HALDOL) injection 5 mg  5 mg IntraMUSCular Q4H PRN    multivitamin, tx-iron-ca-min (THERA-M w/ IRON) tablet 1 Tab  1 Tab Oral DAILY    ceFAZolin (ANCEF) 2 g in sterile water (preservative free) 20 mL IV syringe  2 g IntraVENous Q8H    melatonin tablet 3 mg  3 mg Oral QHS PRN    nicotine (NICODERM CQ) 21 mg/24 hr patch 1 Patch  1 Patch TransDERmal DAILY    influenza vaccine 2020-21 (6 mos+)(PF) (FLUARIX/FLULAVAL/FLUZONE QUAD) injection 0.5 mL  0.5 mL IntraMUSCular PRIOR TO DISCHARGE    buprenorphine-naloxone (SUBOXONE) 8-2mg SL tablet  1 Tab SubLINGual BID    sodium chloride (NS) flush 5-40 mL  5-40 mL IntraVENous Q8H    sodium chloride (NS) flush 5-40 mL  5-40 mL IntraVENous PRN    polyethylene glycol (MIRALAX) packet 17 g  17 g Oral DAILY PRN    promethazine (PHENERGAN) tablet 12.5 mg  12.5 mg Oral Q6H PRN    Or    ondansetron (ZOFRAN) injection 4 mg  4 mg IntraVENous Q6H PRN     ______________________________________________________________________  EXPECTED LENGTH OF STAY: 4d 19h  ACTUAL LENGTH OF STAY:          Beni Oh MD

## 2021-03-11 NOTE — PROGRESS NOTES
Hourly rounding done, pt remains in Sinus Tach, on room air, O2 saturation greater than 90%, ambulates to bathroom with assistance, on bed alarm, using yellow and clear, given tylenol for generalized pain, call bell within reach, use explained to pt              Bedside shift change report given to Joao Carrion (oncoming nurse) by Eugenio Alarcon RN (offgoing nurse). Report included the following information SBAR, Kardex, ED Summary, Intake/Output, MAR, Recent Results, Med Rec Status and Cardiac Rhythm Sinus Tach.

## 2021-03-12 LAB
ALBUMIN SERPL-MCNC: 2.2 G/DL (ref 3.5–5)
ALBUMIN/GLOB SERPL: 0.4 {RATIO} (ref 1.1–2.2)
ALP SERPL-CCNC: 236 U/L (ref 45–117)
ALT SERPL-CCNC: 24 U/L (ref 12–78)
ANION GAP SERPL CALC-SCNC: 3 MMOL/L (ref 5–15)
AST SERPL-CCNC: 18 U/L (ref 15–37)
BASOPHILS # BLD: 0.1 K/UL (ref 0–0.1)
BASOPHILS NFR BLD: 1 % (ref 0–1)
BILIRUB SERPL-MCNC: 0.3 MG/DL (ref 0.2–1)
BUN SERPL-MCNC: 10 MG/DL (ref 6–20)
BUN/CREAT SERPL: 20 (ref 12–20)
CALCIUM SERPL-MCNC: 9.1 MG/DL (ref 8.5–10.1)
CHLORIDE SERPL-SCNC: 102 MMOL/L (ref 97–108)
CO2 SERPL-SCNC: 29 MMOL/L (ref 21–32)
CREAT SERPL-MCNC: 0.49 MG/DL (ref 0.55–1.02)
DIFFERENTIAL METHOD BLD: ABNORMAL
EOSINOPHIL # BLD: 0.1 K/UL (ref 0–0.4)
EOSINOPHIL NFR BLD: 1 % (ref 0–7)
ERYTHROCYTE [DISTWIDTH] IN BLOOD BY AUTOMATED COUNT: 13.3 % (ref 11.5–14.5)
GLOBULIN SER CALC-MCNC: 5.9 G/DL (ref 2–4)
GLUCOSE SERPL-MCNC: 97 MG/DL (ref 65–100)
HCT VFR BLD AUTO: 18.7 % (ref 35–47)
HCT VFR BLD AUTO: 22 % (ref 35–47)
HEMOCCULT STL QL: NEGATIVE
HGB BLD-MCNC: 6 G/DL (ref 11.5–16)
HGB BLD-MCNC: 7 G/DL (ref 11.5–16)
HISTORY CHECKED?,CKHIST: NORMAL
IMM GRANULOCYTES # BLD AUTO: 0.1 K/UL (ref 0–0.04)
IMM GRANULOCYTES NFR BLD AUTO: 1 % (ref 0–0.5)
LYMPHOCYTES # BLD: 1.7 K/UL (ref 0.8–3.5)
LYMPHOCYTES NFR BLD: 20 % (ref 12–49)
MCH RBC QN AUTO: 29.3 PG (ref 26–34)
MCHC RBC AUTO-ENTMCNC: 32.1 G/DL (ref 30–36.5)
MCV RBC AUTO: 91.2 FL (ref 80–99)
MONOCYTES # BLD: 0.6 K/UL (ref 0–1)
MONOCYTES NFR BLD: 7 % (ref 5–13)
NEUTS SEG # BLD: 5.9 K/UL (ref 1.8–8)
NEUTS SEG NFR BLD: 70 % (ref 32–75)
NRBC # BLD: 0 K/UL (ref 0–0.01)
NRBC BLD-RTO: 0 PER 100 WBC
PLATELET # BLD AUTO: 580 K/UL (ref 150–400)
PMV BLD AUTO: 9 FL (ref 8.9–12.9)
POTASSIUM SERPL-SCNC: 4 MMOL/L (ref 3.5–5.1)
PROT SERPL-MCNC: 8.1 G/DL (ref 6.4–8.2)
RBC # BLD AUTO: 2.05 M/UL (ref 3.8–5.2)
RBC MORPH BLD: ABNORMAL
SODIUM SERPL-SCNC: 134 MMOL/L (ref 136–145)
WBC # BLD AUTO: 8.5 K/UL (ref 3.6–11)

## 2021-03-12 PROCEDURE — 80053 COMPREHEN METABOLIC PANEL: CPT

## 2021-03-12 PROCEDURE — 74011250637 HC RX REV CODE- 250/637: Performed by: INTERNAL MEDICINE

## 2021-03-12 PROCEDURE — 36415 COLL VENOUS BLD VENIPUNCTURE: CPT

## 2021-03-12 PROCEDURE — 65660000000 HC RM CCU STEPDOWN

## 2021-03-12 PROCEDURE — 74011250637 HC RX REV CODE- 250/637: Performed by: FAMILY MEDICINE

## 2021-03-12 PROCEDURE — 85018 HEMOGLOBIN: CPT

## 2021-03-12 PROCEDURE — 74011250636 HC RX REV CODE- 250/636: Performed by: INTERNAL MEDICINE

## 2021-03-12 PROCEDURE — 99232 SBSQ HOSP IP/OBS MODERATE 35: CPT | Performed by: INTERNAL MEDICINE

## 2021-03-12 PROCEDURE — 82272 OCCULT BLD FECES 1-3 TESTS: CPT

## 2021-03-12 PROCEDURE — 74011000250 HC RX REV CODE- 250: Performed by: INTERNAL MEDICINE

## 2021-03-12 PROCEDURE — 74011250637 HC RX REV CODE- 250/637: Performed by: NURSE PRACTITIONER

## 2021-03-12 PROCEDURE — 85025 COMPLETE CBC W/AUTO DIFF WBC: CPT

## 2021-03-12 PROCEDURE — 65270000032 HC RM SEMIPRIVATE

## 2021-03-12 RX ORDER — HYDROXYZINE 25 MG/1
50 TABLET, FILM COATED ORAL
Status: DISCONTINUED | OUTPATIENT
Start: 2021-03-12 | End: 2021-03-15

## 2021-03-12 RX ORDER — HALOPERIDOL 10 MG/1
5 TABLET ORAL
Status: DISCONTINUED | OUTPATIENT
Start: 2021-03-12 | End: 2021-03-13

## 2021-03-12 RX ORDER — SODIUM CHLORIDE 9 MG/ML
250 INJECTION, SOLUTION INTRAVENOUS AS NEEDED
Status: DISCONTINUED | OUTPATIENT
Start: 2021-03-12 | End: 2021-03-12

## 2021-03-12 RX ADMIN — HALOPERIDOL LACTATE 5 MG: 5 INJECTION, SOLUTION INTRAMUSCULAR at 14:18

## 2021-03-12 RX ADMIN — PROMETHAZINE HYDROCHLORIDE 12.5 MG: 25 TABLET ORAL at 14:19

## 2021-03-12 RX ADMIN — BUPRENORPHINE HYDROCHLORIDE AND NALOXONE HYDROCHLORIDE DIHYDRATE 1 TABLET: 8; 2 TABLET SUBLINGUAL at 08:28

## 2021-03-12 RX ADMIN — BUPRENORPHINE HYDROCHLORIDE AND NALOXONE HYDROCHLORIDE DIHYDRATE 1 TABLET: 8; 2 TABLET SUBLINGUAL at 17:20

## 2021-03-12 RX ADMIN — WATER 2 G: 1 INJECTION INTRAMUSCULAR; INTRAVENOUS; SUBCUTANEOUS at 06:30

## 2021-03-12 RX ADMIN — HALOPERIDOL LACTATE 5 MG: 5 INJECTION, SOLUTION INTRAMUSCULAR at 07:12

## 2021-03-12 RX ADMIN — HYDROXYZINE HYDROCHLORIDE 50 MG: 25 TABLET, FILM COATED ORAL at 17:20

## 2021-03-12 RX ADMIN — Medication 10 ML: at 14:09

## 2021-03-12 RX ADMIN — Medication 10 ML: at 21:16

## 2021-03-12 RX ADMIN — ACETAMINOPHEN 650 MG: 325 TABLET ORAL at 07:13

## 2021-03-12 RX ADMIN — MULTIPLE VITAMINS W/ MINERALS TAB 1 TABLET: TAB at 08:28

## 2021-03-12 RX ADMIN — ACETAMINOPHEN 650 MG: 325 TABLET ORAL at 17:31

## 2021-03-12 RX ADMIN — WATER 2 G: 1 INJECTION INTRAMUSCULAR; INTRAVENOUS; SUBCUTANEOUS at 21:14

## 2021-03-12 RX ADMIN — WATER 2 G: 1 INJECTION INTRAMUSCULAR; INTRAVENOUS; SUBCUTANEOUS at 14:09

## 2021-03-12 RX ADMIN — Medication 10 ML: at 06:30

## 2021-03-12 NOTE — PROGRESS NOTES
Problem: Falls - Risk of  Goal: *Absence of Falls  Description: Document Kapoorporfirio Beyas Fall Risk and appropriate interventions in the flowsheet. Outcome: Progressing Towards Goal  Note: Fall Risk Interventions:  Mobility Interventions: Patient to call before getting OOB, Bed/chair exit alarm    Mentation Interventions: Bed/chair exit alarm, Door open when patient unattended, Adequate sleep, hydration, pain control, Room close to nurse's station    Medication Interventions: Bed/chair exit alarm, Patient to call before getting OOB, Teach patient to arise slowly    Elimination Interventions: Bed/chair exit alarm, Call light in reach, Patient to call for help with toileting needs              Problem: Patient Education: Go to Patient Education Activity  Goal: Patient/Family Education  Outcome: Progressing Towards Goal     Problem: Pressure Injury - Risk of  Goal: *Prevention of pressure injury  Description: Document Terrance Scale and appropriate interventions in the flowsheet. Outcome: Progressing Towards Goal  Note: Pressure Injury Interventions:  Sensory Interventions: Keep linens dry and wrinkle-free, Minimize linen layers, Turn and reposition approx.  every two hours (pillows and wedges if needed)    Moisture Interventions: Absorbent underpads, Maintain skin hydration (lotion/cream), Minimize layers    Activity Interventions: Pressure redistribution bed/mattress(bed type)    Mobility Interventions: Pressure redistribution bed/mattress (bed type)    Nutrition Interventions: Document food/fluid/supplement intake    Friction and Shear Interventions: HOB 30 degrees or less, Lift sheet, Minimize layers                Problem: Patient Education: Go to Patient Education Activity  Goal: Patient/Family Education  Outcome: Progressing Towards Goal     Problem: Pain  Goal: *Control of Pain  Outcome: Progressing Towards Goal  Goal: *PALLIATIVE CARE:  Alleviation of Pain  Outcome: Progressing Towards Goal     Problem: Patient Education: Go to Patient Education Activity  Goal: Patient/Family Education  Outcome: Progressing Towards Goal     Problem: Hypertension  Goal: *Blood pressure within specified parameters  Outcome: Progressing Towards Goal  Goal: *Fluid volume balance  Outcome: Progressing Towards Goal  Goal: *Labs within defined limits  Outcome: Progressing Towards Goal     Problem: Patient Education: Go to Patient Education Activity  Goal: Patient/Family Education  Outcome: Progressing Towards Goal     Problem: Anxiety  Goal: *Alleviation of anxiety  Outcome: Progressing Towards Goal  Goal: *Alleviation of anxiety (Palliative Care)  Outcome: Progressing Towards Goal     Problem: Patient Education: Go to Patient Education Activity  Goal: Patient/Family Education  Outcome: Progressing Towards Goal     Problem: Non-Violent Restraints  Goal: Removal from restraints as soon as assessed to be safe  Outcome: Progressing Towards Goal  Goal: No harm/injury to patient while restraints in use  Outcome: Progressing Towards Goal  Goal: Patient's dignity will be maintained  Outcome: Progressing Towards Goal  Goal: Patient Interventions  Outcome: Progressing Towards Goal     Problem: Risk for Spread of Infection  Goal: Prevent transmission of infectious organism to others  Description: Prevent the transmission of infectious organisms to other patients, staff members, and visitors.   Outcome: Progressing Towards Goal     Problem: Patient Education:  Go to Education Activity  Goal: Patient/Family Education  Outcome: Progressing Towards Goal     Problem: Nutrition Deficit  Goal: *Optimize nutritional status  Outcome: Progressing Towards Goal

## 2021-03-12 NOTE — PROGRESS NOTES
0651-Pt's HGB went from 7.6 yesterday to 6.0 today. Albina Pimentel NP notified, order for one unit of blood received          Hourly rounding done, pt remains in Sinus Tach, on room air, O2 saturation greater than 90%, on bed alarm, ambulates to bathroom, urine yellow and clear, generalized pain and agitation treated with tylenol and haldol , call bell within reach, use explained to pt              Bedside shift change report given to Ehsan Witt (oncoming nurse) by Anais Mckeon RN (offgoing nurse). Report included the following information SBAR, Kardex, ED Summary, Intake/Output, MAR, Recent Results, Med Rec Status and Cardiac Rhythm Sinus Tach.

## 2021-03-12 NOTE — PROGRESS NOTES
Infectious Disease Progress Note          HPI  Ms Redding seen   She says she feels better   Denied pain   tolerating antibiotics                 Physical Exam:    Vitals:   Patient Vitals for the past 24 hrs:   Temp Pulse Resp BP SpO2   03/12/21 1151 98.7 °F (37.1 °C) (!) 105 18 110/68 97 %   03/12/21 0702 98.8 °F (37.1 °C) (!) 118 18 107/67 98 %   03/12/21 0400 99.5 °F (37.5 °C) (!) 118 22 118/77 100 %   03/11/21 2309 98.8 °F (37.1 °C) (!) 107 23 118/75 100 %   03/11/21 2000 — — — — 100 %   03/11/21 1919 99.1 °F (37.3 °C) (!) 102 20 122/64 97 %     · GEN NAD  · HEENT: no scleral icterus, no thrush, poor dentition  + R IJ  · CV: S1, S2 heard ,  · Lungs:  Clear bilaterally   · Abdomen: soft, non distended, non tender  · Extremities: no edema noted   · Skin no rash  · MSK non tender to palpation of cervical , thoracic, lumbar area   ·   Labs:   Recent Results (from the past 24 hour(s))   CBC WITH AUTOMATED DIFF    Collection Time: 03/12/21  4:54 AM   Result Value Ref Range    WBC 8.5 3.6 - 11.0 K/uL    RBC 2.05 (L) 3.80 - 5.20 M/uL    HGB 6.0 (L) 11.5 - 16.0 g/dL    HCT 18.7 (L) 35.0 - 47.0 %    MCV 91.2 80.0 - 99.0 FL    MCH 29.3 26.0 - 34.0 PG    MCHC 32.1 30.0 - 36.5 g/dL    RDW 13.3 11.5 - 14.5 %    PLATELET 580 (H) 150 - 400 K/uL    MPV 9.0 8.9 - 12.9 FL    NRBC 0.0 0  WBC    ABSOLUTE NRBC 0.00 0.00 - 0.01 K/uL    NEUTROPHILS 70 32 - 75 %    LYMPHOCYTES 20 12 - 49 %    MONOCYTES 7 5 - 13 %    EOSINOPHILS 1 0 - 7 %    BASOPHILS 1 0 - 1 %    IMMATURE GRANULOCYTES 1 (H) 0.0 - 0.5 %    ABS. NEUTROPHILS 5.9 1.8 - 8.0 K/UL    ABS. LYMPHOCYTES 1.7 0.8 - 3.5 K/UL    ABS. MONOCYTES 0.6 0.0 - 1.0 K/UL    ABS. EOSINOPHILS 0.1 0.0 - 0.4 K/UL    ABS. BASOPHILS 0.1 0.0 - 0.1 K/UL    ABS. IMM. GRANS. 0.1 (H) 0.00 - 0.04 K/UL    DF SMEAR SCANNED      RBC COMMENTS MICROCYTOSIS  1+       METABOLIC PANEL, COMPREHENSIVE    Collection Time: 03/12/21  4:54 AM   Result Value Ref Range    Sodium 134 (L) 136 - 145 mmol/L     Potassium 4.0 3.5 - 5.1 mmol/L    Chloride 102 97 - 108 mmol/L    CO2 29 21 - 32 mmol/L    Anion gap 3 (L) 5 - 15 mmol/L    Glucose 97 65 - 100 mg/dL    BUN 10 6 - 20 MG/DL    Creatinine 0.49 (L) 0.55 - 1.02 MG/DL    BUN/Creatinine ratio 20 12 - 20      GFR est AA >60 >60 ml/min/1.73m2    GFR est non-AA >60 >60 ml/min/1.73m2    Calcium 9.1 8.5 - 10.1 MG/DL    Bilirubin, total 0.3 0.2 - 1.0 MG/DL    ALT (SGPT) 24 12 - 78 U/L    AST (SGOT) 18 15 - 37 U/L    Alk. phosphatase 236 (H) 45 - 117 U/L    Protein, total 8.1 6.4 - 8.2 g/dL    Albumin 2.2 (L) 3.5 - 5.0 g/dL    Globulin 5.9 (H) 2.0 - 4.0 g/dL    A-G Ratio 0.4 (L) 1.1 - 2.2     RBC, ALLOCATE    Collection Time: 03/12/21  7:15 AM   Result Value Ref Range    HISTORY CHECKED?  Historical check performed    OCCULT BLOOD, STOOL    Collection Time: 03/12/21  7:27 AM   Result Value Ref Range    Occult blood, stool Negative NEG     HGB & HCT    Collection Time: 03/12/21  8:05 AM   Result Value Ref Range    HGB 7.0 (L) 11.5 - 16.0 g/dL    HCT 22.0 (L) 35.0 - 47.0 %       Microbiology Data:       Blood: 2/28/21  Specimen Information: Blood        Component Value Flag Ref Range Units Status   Special Requests:      Final   No Special Requests    Culture result: Abnormal       Final   Staphylococcus aureus growing in all 4 bottles drawn    Culture result:      Final   Gram pos cocci in clusters   growing in all 4 bottles drawn   CALLED TO AND READ BACK BY   Yesy Cartwright on 3.1.21 at 12:13 by [email or web address]    Susceptibility    Staphylococcus aureus    Antibiotic Interpretation Value Method Comment   Clindamycin ($) Resistant Resistant ug/mL ANGELIKA    Ciprofloxacin ($) Susceptible <=0.5 ug/mL ANGELIKA    Daptomycin ($$$$$) Susceptible 0.25 ug/mL ANGELIKA    Doxycycline ($$) Susceptible <=0.5 ug/mL ANGELIKA    Erythromycin ($$$$) Resistant >=8 ug/mL ANGELIKA    Gentamicin ($) Susceptible <=0.5 ug/mL ANGELIKA    Levofloxacin ($) Susceptible 0.25 ug/mL ANGELIKA    Linezolid ($$$$$) Susceptible 2 ug/mL ANGELIKA Moxifloxacin ($$$$) Susceptible <=0.25 ug/mL ANGELIKA    Oxacillin Susceptible 0.5 ug/mL ANGELIKA    Rifampin ($$$$) Susceptible <=0.5 ug/mL ANGELIKA Rifampin is not to be used for mono-therapy. Tetracycline Susceptible <=1 ug/mL ANGELIKA    Trimeth/Sulfa Susceptible <=10 ug/mL ANGELIKA    Vancomycin ($) Susceptible 1 ug/mL ANGELIKA        Blood 3/4/21  Special Requests:      Preliminary   NO SPECIAL REQUESTS    Culture result: Abnormal       Preliminary   GRAM POSITIVE COCCI IN CLUSTERS GROWING IN 2 OF 4 BOTTLES DRAWN (SITE = L. AC AND R HAND)    Culture result:      Preliminary   REMAINING BOTTLE(S) HAS/HAVE NO GROWTH SO FAR      Blood 3/5/21  Specimen Information: Blood        Component Value Flag Ref Range Units Status   Special Requests:      Preliminary   NO SPECIAL REQUESTS    Culture result: Abnormal       Preliminary   GRAM POSITIVE COCCI IN CLUSTERS GROWING IN 1 OF 4 BOTTLES DRAWN . [place] Stands [place] Stands LEFT ARM SITE    Culture result:      Preliminary   REMAINING BOTTLE(S) HAS/HAVE NO GROWTH SO FAR           Blood 3/7/21  Component Value Ref Range & Units Status   Special Requests: NO SPECIAL REQUESTS    Preliminary   Culture result: NO GROWTH 3 DAYS    Preliminary   Result History          Urine:  Special Requests:      Final   NO SPECIAL REQUESTS    [place] Count >100,000   COLONIES/mL      Final   Culture result: Abnormal       Final   STAPHYLOCOCCUS AUREUS    Susceptibility    Staphylococcus aureus    Antibiotic Interpretation Value Method Comment   Ciprofloxacin ($) Susceptible <=0.5 ug/mL ANGELIKA    Gentamicin ($) Susceptible <=0.5 ug/mL ANGELIKA    Nitrofurantoin Susceptible <=16 ug/mL ANGELIKA    Oxacillin Susceptible 0.5 ug/mL ANGELIKA    Rifampin ($$$$) Susceptible <=0.5 ug/mL ANGELIKA Rifampin is not to be used for mono-therapy.    Tetracycline Susceptible <=1 ug/mL ANGELIKA    Trimeth/Sulfa Susceptible <=10 ug/mL ANGELIKA    Vancomycin ($) Susceptible 1 ug/mL ANGELIKA    Levofloxacin ($) Susceptible 0.5 ug/mL ANGELIKA    Linezolid ($$$$$) Susceptible 2 ug/mL ANGELIKA    Daptomycin ($$$$$) Susceptible 0.25 ug/mL ANGELIKA    Moxifloxacin ($$$$) Susceptible <=0.25 ug/mL ANGELIKA    Doxycycline ($$) Susceptible <=0.5 ug/mL ANGELIKA        Component Value Flag Ref Range Units Status   GDH ANTIGEN Negative   NEG   Final   C. difficile toxin Negative   NEG   Final   INTERPRETATION   NTXCD   Final   NEGATIVE FOR TOXIGENIC C. DIFFICILE                Imaging:   CT chest  FINDINGS:  The lungs are clear of mass, nodule, airspace disease or edema. There is  opacification of peripheral lower lobe airways bilaterally.     The pulmonary arteries are well enhanced and no pulmonary emboli are identified.     There is no mediastinal or hilar adenopathy or mass. The aorta enhances normally  without evidence of aneurysm or dissection.     The visualized portions of the upper abdominal organs are normal.     IMPRESSION  No pulmonary process or septic emboli. Lower lobe branch bronchial  opacification likely reflecting  secretions or aspiration. CT ABD  FINDINGS: Motion artifact limits evaluation  LOWER THORAX: Left lower lobe airspace disease with a small left pleural  effusion. LIVER: No mass. BILIARY TREE: Gallbladder is within normal limits. CBD is not dilated. SPLEEN: Borderline enlarged spleen  PANCREAS: No mass or ductal dilatation. ADRENALS: Unremarkable. KIDNEYS: No mass, calculus, or hydronephrosis. STOMACH: Unremarkable. SMALL BOWEL: No dilatation or wall thickening. COLON: No dilatation or wall thickening. APPENDIX: Unremarkable  PERITONEUM: Free fluid in the pelvis  RETROPERITONEUM: No lymphadenopathy or aortic aneurysm. REPRODUCTIVE ORGANS: Unremarkable  URINARY BLADDER: Distended urinary bladder  BONES: No destructive bone lesion. ABDOMINAL WALL: No mass or hernia. ADDITIONAL COMMENTS: N/A     IMPRESSION  1. Limited by motion.     2.  Left lower lobe airspace disease and left effusion suspicious for pneumonia.     3. Distended urinary bladder.     4. Free fluid in the pelvis.  No evidence of bowel obstruction or free  intraperitoneal air    TTE  · LV: Calculated LVEF is 71%. Normal cavity size, wall thickness, systolic function (ejection fraction normal) and diastolic function. Wall motion: normal. Normal left ventricular strain. · Image quality for this study was suboptimal.  · MV: Mitral valve non-specific thickening. Mild mitral valve prolapse of the posterior leaflet. Severe mitral valve regurgitation is present. Possible vegetation present on the posterior leaflet of the mitral valve. Posterior mitral leaflet vegetation is moderately sized. Vegetation is localized on the atrial aspect of the posterior mitral leaflet. Echo Findings    Left Ventricle Normal cavity size, wall thickness, systolic function (ejection fraction normal) and diastolic function. The muscle mass is normal. The cavity shape is normal. Wall motion: normal. The calculated EF is 71%. End-systolic volume is normal. Normal left ventricular strain. Normal left ventricular diastolic pressure. End-diastolic volume is normal.   Left Atrium Normal cavity size. Right Ventricle Normal cavity size, wall thickness and global systolic function. Right Atrium Normal cavity size. Aortic Valve Aortic valve not well visualized. No stenosis and no regurgitation. Mitral Valve Mitral valve not well visualized. No stenosis. Mitral valve non-specific thickening. Mild mitral valve prolapse of the posterior leaflet. Severe regurgitation. The mitral regurgitant jet is eccentric. There is possible vegetation on the posterior leaflet of the mitral valve. Posterior leaflet vegetation is moderately sized. The vegetation is localized on the atrial aspect of the posterior leaflet. Tricuspid Valve Normal valve structure, no stenosis and no regurgitation. Pulmonic Valve Normal valve structure, no stenosis and no regurgitation. Aorta Normal aortic root, ascending aortic, and aortic arch.    Pericardium Normal pericardium and no evidence of pericardial effusion       MRI     FINDINGS:  There are multiple foci of T2 hyperintensity in the cerebral white matter  predominantly periventricular and some possibly pericallosal in a nonspecific  pattern. There is no definitive associated restricted diffusion in these foci  although there is suggested minimal T2 shine through. There are several punctate foci of restricted diffusion in the right greater  than left posterior frontal and parietal lobes near the gray-white junction. This is in a pattern which would be consistent with emboli in this patient with  known cardiac valve vegetation. A few tiny punctate foci of hypointensity  possible on gradient echo. SWI was not performed. Ventricular size and  configuration are normal. No definite acute intracranial hemorrhage, mass or  abnormal extra-axial fluid collections. Flow voids are present in vertebral  basilar and carotid artery systems. Mild mucosal thickening paranasal sinuses.     IMPRESSION  1. Multiple punctate foci restricted diffusion and some associated T2  hyperintensity gray-white junction greater in the right hemisphere suggesting  emboli. 2. Other multifocal white matter T2 hyperintensity may be more chronic and of  uncertain etiology. 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 Blood cx 3/7 negative.  Repeat blood cx if febrile or clinical worsening     Renally dosed Cefazolin with plan for 6 weeks till  4/18/21 in a monitored setting    Seen  by CTS ( KELLY per CTS)   MRI brain with \"  Multiple punctate foci restricted diffusion and some associated T2 hyperintensity gray-white junction greater in the right hemisphere suggesting emboli\"   Rhinovirus and Enterovirus detected on respiratory panel -- supportive care   Resources for addiction medicine per primary team          Thank for the opportunity to participate in the care of this patient. Please contact with questions or concerns.            Rylee Garza,   1:17 PM

## 2021-03-13 LAB
BACTERIA SPEC CULT: NORMAL
SERVICE CMNT-IMP: NORMAL

## 2021-03-13 PROCEDURE — 74011000250 HC RX REV CODE- 250: Performed by: INTERNAL MEDICINE

## 2021-03-13 PROCEDURE — 65270000029 HC RM PRIVATE

## 2021-03-13 PROCEDURE — 74011250637 HC RX REV CODE- 250/637: Performed by: NURSE PRACTITIONER

## 2021-03-13 PROCEDURE — 74011250637 HC RX REV CODE- 250/637: Performed by: INTERNAL MEDICINE

## 2021-03-13 PROCEDURE — 74011250636 HC RX REV CODE- 250/636: Performed by: INTERNAL MEDICINE

## 2021-03-13 RX ORDER — HALOPERIDOL 10 MG/1
5 TABLET ORAL
Status: DISCONTINUED | OUTPATIENT
Start: 2021-03-13 | End: 2021-03-14

## 2021-03-13 RX ADMIN — WATER 2 G: 1 INJECTION INTRAMUSCULAR; INTRAVENOUS; SUBCUTANEOUS at 21:05

## 2021-03-13 RX ADMIN — Medication 10 ML: at 14:00

## 2021-03-13 RX ADMIN — HYDROXYZINE HYDROCHLORIDE 50 MG: 25 TABLET, FILM COATED ORAL at 14:09

## 2021-03-13 RX ADMIN — HALOPERIDOL 5 MG: 10 TABLET ORAL at 14:27

## 2021-03-13 RX ADMIN — HALOPERIDOL 5 MG: 10 TABLET ORAL at 21:10

## 2021-03-13 RX ADMIN — ACETAMINOPHEN 650 MG: 325 TABLET ORAL at 09:05

## 2021-03-13 RX ADMIN — HALOPERIDOL 5 MG: 10 TABLET ORAL at 09:06

## 2021-03-13 RX ADMIN — ACETAMINOPHEN 650 MG: 325 TABLET ORAL at 14:08

## 2021-03-13 RX ADMIN — WATER 2 G: 1 INJECTION INTRAMUSCULAR; INTRAVENOUS; SUBCUTANEOUS at 06:16

## 2021-03-13 RX ADMIN — Medication 10 ML: at 06:17

## 2021-03-13 RX ADMIN — MULTIPLE VITAMINS W/ MINERALS TAB 1 TABLET: TAB at 09:06

## 2021-03-13 RX ADMIN — Medication 10 ML: at 21:06

## 2021-03-13 RX ADMIN — ACETAMINOPHEN 650 MG: 325 TABLET ORAL at 21:05

## 2021-03-13 RX ADMIN — BUPRENORPHINE HYDROCHLORIDE AND NALOXONE HYDROCHLORIDE DIHYDRATE 1 TABLET: 8; 2 TABLET SUBLINGUAL at 09:05

## 2021-03-13 RX ADMIN — WATER 2 G: 1 INJECTION INTRAMUSCULAR; INTRAVENOUS; SUBCUTANEOUS at 14:08

## 2021-03-13 RX ADMIN — BUPRENORPHINE HYDROCHLORIDE AND NALOXONE HYDROCHLORIDE DIHYDRATE 1 TABLET: 8; 2 TABLET SUBLINGUAL at 19:00

## 2021-03-13 NOTE — PROGRESS NOTES
TRANSFER - IN REPORT:    Verbal report received from [facility] RN(name) on Kristen Jo  being received from [facility]) for routine progression of care      Report consisted of patients Situation, Background, Assessment and   Recommendations(SBAR). Information from the following report(s) SBAR, Kardex and MAR was reviewed with the receiving nurse. Opportunity for questions and clarification was provided. Assessment completed upon patients arrival to unit and care assumed.

## 2021-03-13 NOTE — PROGRESS NOTES
Problem: Falls - Risk of  Goal: *Absence of Falls  Description: Document Charleshivirgie Tristen Fall Risk and appropriate interventions in the flowsheet.   Outcome: Progressing Towards Goal  Note: Fall Risk Interventions:  Mobility Interventions: Patient to call before getting OOB    Mentation Interventions: Bed/chair exit alarm    Medication Interventions: Bed/chair exit alarm    Elimination Interventions: Bed/chair exit alarm

## 2021-03-14 PROCEDURE — 74011250636 HC RX REV CODE- 250/636: Performed by: INTERNAL MEDICINE

## 2021-03-14 PROCEDURE — 65270000029 HC RM PRIVATE

## 2021-03-14 PROCEDURE — 74011000250 HC RX REV CODE- 250: Performed by: INTERNAL MEDICINE

## 2021-03-14 PROCEDURE — 93005 ELECTROCARDIOGRAM TRACING: CPT

## 2021-03-14 PROCEDURE — 74011250637 HC RX REV CODE- 250/637: Performed by: NURSE PRACTITIONER

## 2021-03-14 PROCEDURE — 74011250637 HC RX REV CODE- 250/637: Performed by: INTERNAL MEDICINE

## 2021-03-14 RX ORDER — ALPRAZOLAM 0.5 MG/1
1 TABLET ORAL ONCE
Status: COMPLETED | OUTPATIENT
Start: 2021-03-14 | End: 2021-03-14

## 2021-03-14 RX ADMIN — HYDROXYZINE HYDROCHLORIDE 50 MG: 25 TABLET, FILM COATED ORAL at 21:18

## 2021-03-14 RX ADMIN — ACETAMINOPHEN 650 MG: 325 TABLET ORAL at 14:41

## 2021-03-14 RX ADMIN — HALOPERIDOL 5 MG: 10 TABLET ORAL at 08:35

## 2021-03-14 RX ADMIN — ACETAMINOPHEN 650 MG: 325 TABLET ORAL at 08:34

## 2021-03-14 RX ADMIN — Medication 3 MG: at 21:18

## 2021-03-14 RX ADMIN — BUPRENORPHINE HYDROCHLORIDE AND NALOXONE HYDROCHLORIDE DIHYDRATE 1 TABLET: 8; 2 TABLET SUBLINGUAL at 18:20

## 2021-03-14 RX ADMIN — ALPRAZOLAM 1 MG: 0.5 TABLET ORAL at 18:48

## 2021-03-14 RX ADMIN — Medication 10 ML: at 06:38

## 2021-03-14 RX ADMIN — WATER 2 G: 1 INJECTION INTRAMUSCULAR; INTRAVENOUS; SUBCUTANEOUS at 06:38

## 2021-03-14 RX ADMIN — WATER 2 G: 1 INJECTION INTRAMUSCULAR; INTRAVENOUS; SUBCUTANEOUS at 21:18

## 2021-03-14 RX ADMIN — HYDROXYZINE HYDROCHLORIDE 50 MG: 25 TABLET, FILM COATED ORAL at 14:41

## 2021-03-14 RX ADMIN — BUPRENORPHINE HYDROCHLORIDE AND NALOXONE HYDROCHLORIDE DIHYDRATE 1 TABLET: 8; 2 TABLET SUBLINGUAL at 08:34

## 2021-03-14 RX ADMIN — MULTIPLE VITAMINS W/ MINERALS TAB 1 TABLET: TAB at 08:34

## 2021-03-14 RX ADMIN — Medication 10 ML: at 14:43

## 2021-03-14 RX ADMIN — WATER 2 G: 1 INJECTION INTRAMUSCULAR; INTRAVENOUS; SUBCUTANEOUS at 14:40

## 2021-03-14 RX ADMIN — ACETAMINOPHEN 650 MG: 325 TABLET ORAL at 18:20

## 2021-03-14 RX ADMIN — Medication 10 ML: at 22:00

## 2021-03-14 NOTE — PROGRESS NOTES
[address] [facility]  [facility]ospitalist Group                                                                                          Hospitalist Progress Note  Felisha DO mariano  Answering service: [phone] OR [phone] from in house phone        Date of Service:  3/14/2021  NAME:  Tanya Rodriguez  :  1990  MRN:  [id number]      Please note that this dictation was completed with [employer], the computer voice recognition software. Quite often unanticipated grammatical, syntax, homophones, and other interpretive errors are inadvertently transcribed by the computer software. Please disregard these errors. Please excuse any errors that have escaped final proofreading. Admission Summary:   Patient with altered mental status not willing and unable to provide information. Mother reports she is adult but lives at her house. Not sure when she used any type of IV drugs last time. Possibly yesterday. Reports she has been dealing with IV drug abuse for many years. Apparently after an . Mother reports she has been multiple times in, due to narcotic overdose, reversed by nasal Narcan. She also has history of amphetamine use, unknown last use. Last 4 days unable to ambulate. She has been suffering from severe myalgia and fever occasional cough, negative for Covid no exposure. Mother reports she was negative as well. Patient and mother report, never had similar symptoms in the past.  She takes Suboxone and Xanax as needed for anxiety. Not have any other prescriptions.       Interval history / Subjective: Follow up sepsis. Patient seen and examined. No acute events.  Will check EKG to evaluate Qtc      Assessment & Plan:     Severe Sepsis due to MSSA bacteremia POA in setting of IVDU  Mitral valve Infective endocarditis  Cerebral Septic embolic  -ID following  -Continues cefazolin x 6 week from previous cleared blood culture, tentative date 2021  --Recent blood culture from 3/7/2021 NGTD  -MRI brain with multiple punctate foci restricted diffusion in T2 hyperintensity, suggesting emboli  --holding lovenox due to concerns for hemorrhagic conversion     Rhinovirus:   -detected on viral PCR, droplet precautions    Altered mental status, multifactorial now alert and awake  -Encephalopathy from sepsis and withdrawal from drugs, heroin withdrawal and benzo withdrawal, and embolic events  -avoid benzodiazepines due to overuse/previous over sedation and addictive effects       Anemia:   -Etiology unclear, monitor closely, patient defers blood transfusion at this time    Polysubstance with possible benzo/heroin  heroin withdrawal and/or amphetamine overdose  Anxiety  -continue home suboxone  -hold home xanax  -atarax for agitation. Hold haldol due to prolonged Qtc  -discussed initiating SSRI for anxiety and patient declined  -psychiatry consult this coming week      Hypokalemia  -replete as needed    Thrombocytopenia now with thrombocytosis  -Monitor, likely reactive    Diarrhea:   -work up negative    Hypernatremia   -resolved    Body mass index is 16.95 kg/m². History of hepatitis C-negative viral load HIV NR, VL pending      Patient unable to have visitors due to concerns for bringing in controlled substances    Code status: FULL  DVT prophylaxis:SCD/ambulatory    Care Plan discussed with: Patient/Family  Anticipated Disposition: Home w/Family  Anticipated Discharge: Greater than 48 hours Antibiotics until 4/18/2021     Hospital Problems  Never Reviewed          Codes Class Noted POA    Cerebrovascular accident (CVA) due to embolism of precerebral artery ([facility] [address] ICD-10-CM: I63.10  ICD-9-CM: 434.11  3/6/2021 Unknown        Sepsis ([facility] [address] ICD-10-CM: A41.9  ICD-9-CM: 038.9, 995.91  2/28/2021 Unknown              Review of Systems:     Negative unless stated above    Vital Signs:    Last 24hrs VS reviewed since prior progress note.  Most recent are:  Visit Vitals  /71 (BP 1 Location: Right upper arm, BP Patient Position: At rest)   Pulse (!) 121   Temp 98.4 °F (36.9 °C)   Resp 16   Ht 5' 6\" (1.676 m)   Wt 47.6 kg (105 lb)   SpO2 98%   BMI 16.95 kg/m²       No intake or output data in the 24 hours ending 03/14/21 1107     Physical Examination:     I had a face to face encounter with this patient and independently examined them on 03/14/21 as outlined below:        Constitutional:  AAOX3, awake, anxious, thin appearing    ENT:  Oral mucosa moist, oropharynx benign. Resp:  CTA bilaterally. No wheezing/rhonchi/rales. No accessory muscle use   CV:  tachycardic, 2 /6 systolic murmur heard at the right upper sternal border    GI:  Soft, non distended, non tender. normoactive bowel sounds, no hepatosplenomegaly     Musculoskeletal:  Moving around in the bed    Neurologic: Alert and oriented             Data Review:    Review and/or order of clinical lab test      Labs:     Recent Labs     03/12/21  0805 03/12/21  0454   WBC  --  8.5   HGB 7.0* 6.0*   HCT 22.0* 18.7*   PLT  --  580*     Recent Labs     03/12/21  0454   *   K 4.0      CO2 29   BUN 10   CREA 0.49*   GLU 97   CA 9.1     Recent Labs     03/12/21  0454   ALT 24   *   TBILI 0.3   TP 8.1   ALB 2.2*   GLOB 5.9*     No results for input(s): INR, PTP, APTT, INREXT, INREXT in the last 72 hours. No results for input(s): FE, TIBC, PSAT, FERR in the last 72 hours. No results found for: FOL, RBCF   No results for input(s): PH, PCO2, PO2 in the last 72 hours. No results for input(s): CPK, CKNDX, TROIQ in the last 72 hours.     No lab exists for component: CPKMB  Lab Results   Component Value Date/Time    Cholesterol, total 74 03/07/2021 04:42 AM    HDL Cholesterol 21 03/07/2021 04:42 AM    LDL, calculated 38.6 03/07/2021 04:42 AM    Triglyceride 72 03/07/2021 04:42 AM    CHOL/HDL Ratio 3.5 03/07/2021 04:42 AM     Lab Results   Component Value Date/Time    Glucose (POC) 132 (H) 03/03/2021 05:27 AM    Glucose (POC) 118 (H) 03/03/2021 12:18 AM Lab Results   Component Value Date/Time    Color DARK YELLOW 02/28/2021 06:12 PM    Appearance CLEAR 02/28/2021 06:12 PM    Specific gravity 1.020 02/28/2021 06:12 PM    Specific gravity 1.016 02/28/2021 06:00 AM    pH (UA) 6.5 02/28/2021 06:12 PM    Protein 100 (A) 02/28/2021 06:12 PM    Glucose Negative 02/28/2021 06:12 PM    Ketone 15 (A) 02/28/2021 06:12 PM    Bilirubin Negative 02/28/2021 06:00 AM    Urobilinogen 2.0 (H) 02/28/2021 06:12 PM    Nitrites Positive (A) 02/28/2021 06:12 PM    Leukocyte Esterase MODERATE (A) 02/28/2021 06:12 PM    Epithelial cells FEW 02/28/2021 06:12 PM    Bacteria 1+ (A) 02/28/2021 06:12 PM    WBC 20-50 02/28/2021 06:12 PM    RBC >100 (H) 02/28/2021 06:12 PM         Medications Reviewed:     Current Facility-Administered Medications   Medication Dose Route Frequency    hydrOXYzine HCL (ATARAX) tablet 50 mg  50 mg Oral TID PRN    acetaminophen (TYLENOL) tablet 650 mg  650 mg Oral Q4H PRN    Or    acetaminophen (TYLENOL) suppository 650 mg  650 mg Rectal Q4H PRN    ibuprofen (MOTRIN) tablet 600 mg  600 mg Oral Q6H PRN    multivitamin, tx-iron-ca-min (THERA-M w/ IRON) tablet 1 Tab  1 Tab Oral DAILY    ceFAZolin (ANCEF) 2 g in sterile water (preservative free) 20 mL IV syringe  2 g IntraVENous Q8H    melatonin tablet 3 mg  3 mg Oral QHS PRN    nicotine (NICODERM CQ) 21 mg/24 hr patch 1 Patch  1 Patch TransDERmal DAILY    influenza vaccine 2020-21 (6 mos+)(PF) (FLUARIX/FLULAVAL/FLUZONE QUAD) injection 0.5 mL  0.5 mL IntraMUSCular PRIOR TO DISCHARGE    buprenorphine-naloxone (SUBOXONE) 8-2mg SL tablet  1 Tab SubLINGual BID    sodium chloride (NS) flush 5-40 mL  5-40 mL IntraVENous Q8H    sodium chloride (NS) flush 5-40 mL  5-40 mL IntraVENous PRN    polyethylene glycol (MIRALAX) packet 17 g  17 g Oral DAILY PRN    promethazine (PHENERGAN) tablet 12.5 mg  12.5 mg Oral Q6H PRN    Or    ondansetron (ZOFRAN) injection 4 mg  4 mg IntraVENous Q6H PRN ______________________________________________________________________  EXPECTED LENGTH OF STAY: 4d 19h  ACTUAL LENGTH OF STAY:          [address], DO

## 2021-03-14 NOTE — PROGRESS NOTES
Problem: Falls - Risk of  Goal: *Absence of Falls  Description: Document Jason Sullivan Fall Risk and appropriate interventions in the flowsheet.   Outcome: Progressing Towards Goal  Note: Fall Risk Interventions:  Mobility Interventions: Patient to call before getting OOB    Mentation Interventions: More frequent rounding, Room close to nurse's station    Medication Interventions: Evaluate medications/consider consulting pharmacy    Elimination Interventions: Bed/chair exit alarm

## 2021-03-14 NOTE — PROGRESS NOTES
Bedside and verbal shift change report given to Waddell Spatz RN (oncoming nurse) by Orlando Stoner RN (offgoing nurse). Report included the following information SBAR, Kardex and MAR.

## 2021-03-14 NOTE — PROGRESS NOTES
Bedside and Verbal shift change report given to Xi Concepcion (oncoming nurse) by Momo Loving (offgoing nurse). Report included the following information SBAR, Kardex and MAR. I talked with Linda about Ms. Davis & her situation. Linda is going to call Lina and discuss a HEP and stretches to for her to do while she is home recovering.  The goal is for Ms. Davis to try and get back to Therapy on 2/9/18. I have called the patient to discuss this and Linda will be in contact with her this afternoon and throughout next week as well. EN.

## 2021-03-14 NOTE — PROGRESS NOTES
03/13/21 2000   Vital Signs   Temp 98.2 °F (36.8 °C)   Temp Source Oral   Pulse (Heart Rate) (!) 114   Heart Rate Source Monitor   Resp Rate 16   O2 Sat (%) 98 %   Level of Consciousness Alert   /69   MAP (Calculated) 82   BP 1 Method Automatic   BP 1 Location Left upper arm   BP Patient Position Sitting   MEWS Score 3     MEWS 3. Patient tachycardic at baseline. Physician aware. Will continue to monitor.

## 2021-03-15 LAB
ANION GAP SERPL CALC-SCNC: 2 MMOL/L (ref 5–15)
ATRIAL RATE: 105 BPM
ATRIAL RATE: 115 BPM
BUN SERPL-MCNC: 11 MG/DL (ref 6–20)
BUN/CREAT SERPL: 20 (ref 12–20)
CALCIUM SERPL-MCNC: 9.3 MG/DL (ref 8.5–10.1)
CALCULATED P AXIS, ECG09: 56 DEGREES
CALCULATED P AXIS, ECG09: 61 DEGREES
CALCULATED R AXIS, ECG10: 95 DEGREES
CALCULATED R AXIS, ECG10: 98 DEGREES
CALCULATED T AXIS, ECG11: 63 DEGREES
CALCULATED T AXIS, ECG11: 64 DEGREES
CHLORIDE SERPL-SCNC: 100 MMOL/L (ref 97–108)
CO2 SERPL-SCNC: 33 MMOL/L (ref 21–32)
CREAT SERPL-MCNC: 0.56 MG/DL (ref 0.55–1.02)
DIAGNOSIS, 93000: NORMAL
DIAGNOSIS, 93000: NORMAL
ERYTHROCYTE [DISTWIDTH] IN BLOOD BY AUTOMATED COUNT: 13.6 % (ref 11.5–14.5)
GLUCOSE SERPL-MCNC: 103 MG/DL (ref 65–100)
HCT VFR BLD AUTO: 24 % (ref 35–47)
HGB BLD-MCNC: 7.6 G/DL (ref 11.5–16)
MCH RBC QN AUTO: 28.4 PG (ref 26–34)
MCHC RBC AUTO-ENTMCNC: 31.7 G/DL (ref 30–36.5)
MCV RBC AUTO: 89.6 FL (ref 80–99)
NRBC # BLD: 0 K/UL (ref 0–0.01)
NRBC BLD-RTO: 0 PER 100 WBC
P-R INTERVAL, ECG05: 154 MS
P-R INTERVAL, ECG05: 156 MS
PLATELET # BLD AUTO: 461 K/UL (ref 150–400)
PMV BLD AUTO: 8.5 FL (ref 8.9–12.9)
POTASSIUM SERPL-SCNC: 4.1 MMOL/L (ref 3.5–5.1)
Q-T INTERVAL, ECG07: 348 MS
Q-T INTERVAL, ECG07: 392 MS
QRS DURATION, ECG06: 86 MS
QRS DURATION, ECG06: 86 MS
QTC CALCULATION (BEZET), ECG08: 481 MS
QTC CALCULATION (BEZET), ECG08: 518 MS
RBC # BLD AUTO: 2.68 M/UL (ref 3.8–5.2)
SODIUM SERPL-SCNC: 135 MMOL/L (ref 136–145)
VENTRICULAR RATE, ECG03: 105 BPM
VENTRICULAR RATE, ECG03: 115 BPM
WBC # BLD AUTO: 6.9 K/UL (ref 3.6–11)

## 2021-03-15 PROCEDURE — 80048 BASIC METABOLIC PNL TOTAL CA: CPT

## 2021-03-15 PROCEDURE — 74011250636 HC RX REV CODE- 250/636: Performed by: INTERNAL MEDICINE

## 2021-03-15 PROCEDURE — 36415 COLL VENOUS BLD VENIPUNCTURE: CPT

## 2021-03-15 PROCEDURE — 74011250637 HC RX REV CODE- 250/637: Performed by: INTERNAL MEDICINE

## 2021-03-15 PROCEDURE — 93005 ELECTROCARDIOGRAM TRACING: CPT

## 2021-03-15 PROCEDURE — 99232 SBSQ HOSP IP/OBS MODERATE 35: CPT | Performed by: INTERNAL MEDICINE

## 2021-03-15 PROCEDURE — 74011250637 HC RX REV CODE- 250/637: Performed by: NURSE PRACTITIONER

## 2021-03-15 PROCEDURE — 65270000029 HC RM PRIVATE

## 2021-03-15 PROCEDURE — 74011000250 HC RX REV CODE- 250: Performed by: INTERNAL MEDICINE

## 2021-03-15 PROCEDURE — 85027 COMPLETE CBC AUTOMATED: CPT

## 2021-03-15 RX ORDER — CLONAZEPAM 1 MG/1
1 TABLET ORAL
Status: DISCONTINUED | OUTPATIENT
Start: 2021-03-15 | End: 2021-03-17 | Stop reason: HOSPADM

## 2021-03-15 RX ORDER — MIRTAZAPINE 15 MG/1
15 TABLET, FILM COATED ORAL
Status: DISCONTINUED | OUTPATIENT
Start: 2021-03-15 | End: 2021-03-17 | Stop reason: HOSPADM

## 2021-03-15 RX ORDER — HYDROXYZINE 25 MG/1
50 TABLET, FILM COATED ORAL 4 TIMES DAILY
Status: DISCONTINUED | OUTPATIENT
Start: 2021-03-15 | End: 2021-03-17 | Stop reason: HOSPADM

## 2021-03-15 RX ORDER — IBUPROFEN 400 MG/1
400 TABLET ORAL
Status: DISCONTINUED | OUTPATIENT
Start: 2021-03-15 | End: 2021-03-15

## 2021-03-15 RX ORDER — ALPRAZOLAM 0.5 MG/1
1 TABLET ORAL
Status: DISCONTINUED | OUTPATIENT
Start: 2021-03-15 | End: 2021-03-15

## 2021-03-15 RX ADMIN — MIRTAZAPINE 15 MG: 15 TABLET, FILM COATED ORAL at 22:07

## 2021-03-15 RX ADMIN — CLONAZEPAM 1 MG: 1 TABLET ORAL at 22:07

## 2021-03-15 RX ADMIN — HYDROXYZINE HYDROCHLORIDE 50 MG: 25 TABLET, FILM COATED ORAL at 04:09

## 2021-03-15 RX ADMIN — Medication 10 ML: at 14:24

## 2021-03-15 RX ADMIN — ACETAMINOPHEN 650 MG: 325 TABLET ORAL at 02:26

## 2021-03-15 RX ADMIN — HYDROXYZINE HYDROCHLORIDE 50 MG: 25 TABLET, FILM COATED ORAL at 19:31

## 2021-03-15 RX ADMIN — Medication 10 ML: at 07:02

## 2021-03-15 RX ADMIN — ALPRAZOLAM 1 MG: 0.5 TABLET ORAL at 14:22

## 2021-03-15 RX ADMIN — ACETAMINOPHEN 650 MG: 325 TABLET ORAL at 14:24

## 2021-03-15 RX ADMIN — BUPRENORPHINE HYDROCHLORIDE AND NALOXONE HYDROCHLORIDE DIHYDRATE 1 TABLET: 8; 2 TABLET SUBLINGUAL at 17:13

## 2021-03-15 RX ADMIN — IBUPROFEN 600 MG: 600 TABLET, FILM COATED ORAL at 19:33

## 2021-03-15 RX ADMIN — WATER 2 G: 1 INJECTION INTRAMUSCULAR; INTRAVENOUS; SUBCUTANEOUS at 14:23

## 2021-03-15 RX ADMIN — WATER 2 G: 1 INJECTION INTRAMUSCULAR; INTRAVENOUS; SUBCUTANEOUS at 06:00

## 2021-03-15 RX ADMIN — Medication 10 ML: at 22:00

## 2021-03-15 RX ADMIN — HYDROXYZINE HYDROCHLORIDE 50 MG: 25 TABLET, FILM COATED ORAL at 12:34

## 2021-03-15 RX ADMIN — IBUPROFEN 600 MG: 600 TABLET, FILM COATED ORAL at 12:34

## 2021-03-15 RX ADMIN — ALPRAZOLAM 1 MG: 0.5 TABLET ORAL at 02:47

## 2021-03-15 RX ADMIN — ACETAMINOPHEN 650 MG: 325 TABLET ORAL at 08:09

## 2021-03-15 RX ADMIN — ACETAMINOPHEN 650 MG: 325 TABLET ORAL at 22:10

## 2021-03-15 RX ADMIN — MULTIPLE VITAMINS W/ MINERALS TAB 1 TABLET: TAB at 08:09

## 2021-03-15 RX ADMIN — BUPRENORPHINE HYDROCHLORIDE AND NALOXONE HYDROCHLORIDE DIHYDRATE 1 TABLET: 8; 2 TABLET SUBLINGUAL at 08:09

## 2021-03-15 RX ADMIN — WATER 2 G: 1 INJECTION INTRAMUSCULAR; INTRAVENOUS; SUBCUTANEOUS at 22:00

## 2021-03-15 NOTE — PROGRESS NOTES
03/15/21 1516   Vital Signs   Temp 99.1 °F (37.3 °C)   Temp Source Oral   Pulse (Heart Rate) (!) 123   Heart Rate Source Monitor   Resp Rate 16   O2 Sat (%) 99 %   Level of Consciousness Alert   BP 95/65   MAP (Calculated) 75   BP 1 Method Automatic   BP 1 Location Right upper arm   BP Patient Position Sitting   MEWS Score 4   Oxygen Therapy   O2 Device Room air   MD aware

## 2021-03-15 NOTE — PROGRESS NOTES
Infectious Disease Progress Note          HPI  Ms Shahzad Fernandez seen   Says she is counting days down to finish therapy  tolerating antibiotics                 Physical Exam:    Vitals:   Patient Vitals for the past 24 hrs:   Temp Pulse Resp BP SpO2   03/15/21 0847 98.9 °F (37.2 °C) (!) 114 16 115/77 99 %   03/14/21 2041 98.2 °F (36.8 °C) (!) 115 16 113/75 98 %   03/14/21 1420 98.1 °F (36.7 °C) (!) 113 16 112/72 97 %     · GEN NAD  · HEENT: no scleral icterus, no thrush, poor dentition  + R IJ  · CV: S1, S2 heard ,  · Lungs:  Clear bilaterally   · Abdomen: soft, non distended, non tender  · Extremities: no edema noted   · Skin no rash  · MSK non tender to palpation of cervical , thoracic, lumbar area   · Neuro alert, oriented, ambulatory independently   ·   Labs:   Recent Results (from the past 24 hour(s))   CBC W/O DIFF    Collection Time: 03/15/21  2:32 AM   Result Value Ref Range    WBC 6.9 3.6 - 11.0 K/uL    RBC 2.68 (L) 3.80 - 5.20 M/uL    HGB 7.6 (L) 11.5 - 16.0 g/dL    HCT 24.0 (L) 35.0 - 47.0 %    MCV 89.6 80.0 - 99.0 FL    MCH 28.4 26.0 - 34.0 PG    MCHC 31.7 30.0 - 36.5 g/dL    RDW 13.6 11.5 - 14.5 %    PLATELET 902 (H) 531 - 400 K/uL    MPV 8.5 (L) 8.9 - 12.9 FL    NRBC 0.0 0  WBC    ABSOLUTE NRBC 0.00 0.00 - 4.00 K/uL   METABOLIC PANEL, BASIC    Collection Time: 03/15/21  2:32 AM   Result Value Ref Range    Sodium 135 (L) 136 - 145 mmol/L    Potassium 4.1 3.5 - 5.1 mmol/L    Chloride 100 97 - 108 mmol/L    CO2 33 (H) 21 - 32 mmol/L    Anion gap 2 (L) 5 - 15 mmol/L    Glucose 103 (H) 65 - 100 mg/dL    BUN 11 6 - 20 MG/DL    Creatinine 0.56 0.55 - 1.02 MG/DL    BUN/Creatinine ratio 20 12 - 20      GFR est AA >60 >60 ml/min/1.73m2    GFR est non-AA >60 >60 ml/min/1.73m2    Calcium 9.3 8.5 - 10.1 MG/DL   EKG, 12 LEAD, INITIAL    Collection Time: 03/15/21  7:12 AM   Result Value Ref Range    Ventricular Rate 115 BPM    Atrial Rate 115 BPM    P-R Interval 154 ms    QRS Duration 86 ms    Q-T Interval 348 ms QTC Calculation (Bezet) 481 ms    Calculated P Axis 61 degrees    Calculated R Axis 98 degrees    Calculated T Axis 64 degrees    Diagnosis       Sinus tachycardia  Nonspecific ST abnormality  Abnormal ECG  When compared with ECG of 14-MAR-2021 08:18,  No significant change was found         Microbiology Data:       Blood: 2/28/21  Specimen Information: Blood        Component Value Flag Ref Range Units Status   Special Requests:      Final   No Special Requests    Culture result: Abnormal       Final   Staphylococcus aureus growing in all 4 bottles drawn    Culture result:      Final   Gram pos cocci in clusters   growing in all 4 bottles drawn   CALLED TO AND READ BACK BY   Yesy Farmer on 3.1.21 at 12:13 by [email or web address]    Susceptibility    Staphylococcus aureus    Antibiotic Interpretation Value Method Comment   Clindamycin ($) Resistant Resistant ug/mL ANGELIKA    Ciprofloxacin ($) Susceptible <=0.5 ug/mL ANGELIKA    Daptomycin ($$$$$) Susceptible 0.25 ug/mL ANGELIKA    Doxycycline ($$) Susceptible <=0.5 ug/mL ANGELIKA    Erythromycin ($$$$) Resistant >=8 ug/mL ANGELIKA    Gentamicin ($) Susceptible <=0.5 ug/mL ANGELIKA    Levofloxacin ($) Susceptible 0.25 ug/mL ANGELIKA    Linezolid ($$$$$) Susceptible 2 ug/mL ANGELIKA    Moxifloxacin ($$$$) Susceptible <=0.25 ug/mL ANGELIKA    Oxacillin Susceptible 0.5 ug/mL ANGELIKA    Rifampin ($$$$) Susceptible <=0.5 ug/mL ANGELIKA Rifampin is not to be used for mono-therapy.    Tetracycline Susceptible <=1 ug/mL ANGELIKA    Trimeth/Sulfa Susceptible <=10 ug/mL ANGELIKA    Vancomycin ($) Susceptible 1 ug/mL ANGELIKA        Blood 3/4/21  Special Requests:      Preliminary   NO SPECIAL REQUESTS    Culture result: Abnormal       Preliminary   GRAM POSITIVE COCCI IN CLUSTERS GROWING IN 2 OF 4 BOTTLES DRAWN (SITE = L. AC AND R HAND)    Culture result:      Preliminary   REMAINING BOTTLE(S) HAS/HAVE NO GROWTH SO FAR      Blood 3/5/21  Specimen Information: Blood        Component Value Flag Ref Range Units Status   Special Requests:      Preliminary   NO SPECIAL REQUESTS    Culture result: Abnormal       Preliminary   GRAM POSITIVE COCCI IN CLUSTERS GROWING IN 1 OF 4 BOTTLES DRAWN . [place] St[place] Stands LEFT ARM SITE    Culture result:      Preliminary   REMAINING BOTTLE(S) HAS/HAVE NO GROWTH SO FAR           Blood 3/7/21  Component Value Ref Range & Units Status   Special Requests: NO SPECIAL REQUESTS    Preliminary   Culture result: NO GROWTH 3 DAYS    Preliminary   Result History          Urine:  Special Requests:      Final   NO SPECIAL REQUESTS    [place] Count >100,000   COLONIES/mL      Final   Culture result: Abnormal       Final   STAPHYLOCOCCUS AUREUS    Susceptibility    Staphylococcus aureus    Antibiotic Interpretation Value Method Comment   Ciprofloxacin ($) Susceptible <=0.5 ug/mL ANGELIKA    Gentamicin ($) Susceptible <=0.5 ug/mL ANGELIKA    Nitrofurantoin Susceptible <=16 ug/mL ANGELIKA    Oxacillin Susceptible 0.5 ug/mL ANGELIKA    Rifampin ($$$$) Susceptible <=0.5 ug/mL ANGELIKA Rifampin is not to be used for mono-therapy. Tetracycline Susceptible <=1 ug/mL ANGELIKA    Trimeth/Sulfa Susceptible <=10 ug/mL ANGELIKA    Vancomycin ($) Susceptible 1 ug/mL ANGELIKA    Levofloxacin ($) Susceptible 0.5 ug/mL ANGELIKA    Linezolid ($$$$$) Susceptible 2 ug/mL ANGELIKA    Daptomycin ($$$$$) Susceptible 0.25 ug/mL ANGELIKA    Moxifloxacin ($$$$) Susceptible <=0.25 ug/mL ANGELIKA    Doxycycline ($$) Susceptible <=0.5 ug/mL ANGELIKA        Component Value Flag Ref Range Units Status   GDH ANTIGEN Negative   NEG   Final   C. difficile toxin Negative   NEG   Final   INTERPRETATION   NTXCD   Final   NEGATIVE FOR TOXIGENIC C. DIFFICILE                Imaging:   CT chest  FINDINGS:  The lungs are clear of mass, nodule, airspace disease or edema. There is  opacification of peripheral lower lobe airways bilaterally.     The pulmonary arteries are well enhanced and no pulmonary emboli are identified.     There is no mediastinal or hilar adenopathy or mass.  The aorta enhances normally  without evidence of aneurysm or dissection.     The visualized portions of the upper abdominal organs are normal.     IMPRESSION  No pulmonary process or septic emboli. Lower lobe branch bronchial  opacification likely reflecting  secretions or aspiration. CT ABD  FINDINGS: Motion artifact limits evaluation  LOWER THORAX: Left lower lobe airspace disease with a small left pleural  effusion. LIVER: No mass. BILIARY TREE: Gallbladder is within normal limits. CBD is not dilated. SPLEEN: Borderline enlarged spleen  PANCREAS: No mass or ductal dilatation. ADRENALS: Unremarkable. KIDNEYS: No mass, calculus, or hydronephrosis. STOMACH: Unremarkable. SMALL BOWEL: No dilatation or wall thickening. COLON: No dilatation or wall thickening. APPENDIX: Unremarkable  PERITONEUM: Free fluid in the pelvis  RETROPERITONEUM: No lymphadenopathy or aortic aneurysm. REPRODUCTIVE ORGANS: Unremarkable  URINARY BLADDER: Distended urinary bladder  BONES: No destructive bone lesion. ABDOMINAL WALL: No mass or hernia. ADDITIONAL COMMENTS: N/A     IMPRESSION  1. Limited by motion.     2.  Left lower lobe airspace disease and left effusion suspicious for pneumonia.     3. Distended urinary bladder.     4. Free fluid in the pelvis. No evidence of bowel obstruction or free  intraperitoneal air    TTE  · LV: Calculated LVEF is 71%. Normal cavity size, wall thickness, systolic function (ejection fraction normal) and diastolic function. Wall motion: normal. Normal left ventricular strain. · Image quality for this study was suboptimal.  · MV: Mitral valve non-specific thickening. Mild mitral valve prolapse of the posterior leaflet. Severe mitral valve regurgitation is present. Possible vegetation present on the posterior leaflet of the mitral valve. Posterior mitral leaflet vegetation is moderately sized. Vegetation is localized on the atrial aspect of the posterior mitral leaflet.       Echo Findings    Left Ventricle Normal cavity size, wall thickness, systolic function (ejection fraction normal) and diastolic function. The muscle mass is normal. The cavity shape is normal. Wall motion: normal. The calculated EF is 71%. End-systolic volume is normal. Normal left ventricular strain. Normal left ventricular diastolic pressure. End-diastolic volume is normal.   Left Atrium Normal cavity size. Right Ventricle Normal cavity size, wall thickness and global systolic function. Right Atrium Normal cavity size. Aortic Valve Aortic valve not well visualized. No stenosis and no regurgitation. Mitral Valve Mitral valve not well visualized. No stenosis. Mitral valve non-specific thickening. Mild mitral valve prolapse of the posterior leaflet. Severe regurgitation. The mitral regurgitant jet is eccentric. There is possible vegetation on the posterior leaflet of the mitral valve. Posterior leaflet vegetation is moderately sized. The vegetation is localized on the atrial aspect of the posterior leaflet. Tricuspid Valve Normal valve structure, no stenosis and no regurgitation. Pulmonic Valve Normal valve structure, no stenosis and no regurgitation. Aorta Normal aortic root, ascending aortic, and aortic arch. Pericardium Normal pericardium and no evidence of pericardial effusion       MRI     FINDINGS:  There are multiple foci of T2 hyperintensity in the cerebral white matter  predominantly periventricular and some possibly pericallosal in a nonspecific  pattern. There is no definitive associated restricted diffusion in these foci  although there is suggested minimal T2 shine through. There are several punctate foci of restricted diffusion in the right greater  than left posterior frontal and parietal lobes near the gray-white junction. This is in a pattern which would be consistent with emboli in this patient with  known cardiac valve vegetation. A few tiny punctate foci of hypointensity  possible on gradient echo. SWI was not performed.  Ventricular size and  configuration are normal. No definite acute intracranial hemorrhage, mass or  abnormal extra-axial fluid collections. Flow voids are present in vertebral  basilar and carotid artery systems. Mild mucosal thickening paranasal sinuses.     IMPRESSION  1. Multiple punctate foci restricted diffusion and some associated T2  hyperintensity gray-white junction greater in the right hemisphere suggesting  emboli. 2. Other multifocal white matter T2 hyperintensity may be more chronic and of  uncertain etiology. 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 Blood cx 3/7 negative. Repeat blood cx if febrile or clinical worsening     Renally dosed Cefazolin with plan for 6 weeks till  4/18/21 in a monitored setting    Seen  by CTS ( KELLY per CTS)   MRI brain with \"  Multiple punctate foci restricted diffusion and some associated T2 hyperintensity gray-white junction greater in the right hemisphere suggesting emboli\"   Rhinovirus and Enterovirus detected on respiratory panel -- supportive care   Resources for addiction medicine per primary team    Dental care ideal to be done while on IV antibiotic therapy         Thank for the opportunity to participate in the care of this patient. Please contact with questions or concerns.            Rylee Garza,   1:33 PM

## 2021-03-15 NOTE — PROGRESS NOTES
[facility]  Hospitalist Group                                                                                          Hospitalist Progress Note  [facility],   Answering service: [phone] OR [phone] from in house phone        Date of Service:  3/15/2021  NAME:  Mayank Jimenez  :  1990  MRN:  [id number]      Please note that this dictation was completed with [employer], the computer voice recognition software. Quite often unanticipated grammatical, syntax, homophones, and other interpretive errors are inadvertently transcribed by the computer software. Please disregard these errors. Please excuse any errors that have escaped final proofreading. Admission Summary:   Patient with altered mental status not willing and unable to provide information. Mother reports she is adult but lives at her house. Not sure when she used any type of IV drugs last time. Possibly yesterday. Reports she has been dealing with IV drug abuse for many years. Apparently after an . Mother reports she has been multiple times in, due to narcotic overdose, reversed by nasal Narcan. She also has history of amphetamine use, unknown last use. Last 4 days unable to ambulate. She has been suffering from severe myalgia and fever occasional cough, negative for Covid no exposure. Mother reports she was negative as well. Patient and mother report, never had similar symptoms in the past.  She takes Suboxone and Xanax as needed for anxiety. Not have any other prescriptions.       Interval history / Subjective: Follow up sepsis. Patient seen and examined. States that she had intense diffuse pain overnight and was up all night. Discussed drug rehab on discharge and patient defers. Has ongoing anxiety while hospitalized. EKG with improved Qtc. Patient is open to talking with psychiatry.       Assessment & Plan:     Severe Sepsis due to MSSA bacteremia POA in setting of IVDU  Mitral valve Infective endocarditis  Cerebral Septic embolic  -ID following  -Continues cefazolin x 6 week from previous cleared blood culture, tentative date 4/18/2021  --Recent blood culture from 3/7/2021 NGTD  -MRI brain with multiple punctate foci restricted diffusion in T2 hyperintensity, suggesting emboli  --holding lovenox due to concerns for hemorrhagic conversion     Polysubstance with possible benzo/ heroin withdrawal and/or amphetamine overdose  Anxiety, persistent  -continue home suboxone  -will reinitiate home xanax, although not a good long term medication for anxiety and has addictive properties.   -Continue atarax prn  -Psychiatry consulted       Rhinovirus:   -detected on viral PCR, droplet precautions    Altered mental status, multifactorial now alert and awake  -Encephalopathy from sepsis and withdrawal from drugs, heroin withdrawal and benzo withdrawal, and embolic events  -avoid benzodiazepines due to overuse/previous over sedation and addictive effects       Anemia:   -Etiology unclear, monitor closely, patient defers blood transfusion at this time    Hypokalemia  -replete as needed    Thrombocytopenia now with thrombocytosis  -Monitor, likely reactive    Diarrhea:   -work up negative    Hypernatremia   -resolved    Body mass index is 16.95 kg/m².    History of hepatitis C-negative viral load HIV NR, VL pending      Patient unable to have visitors due to concerns for bringing in controlled substances    Has CVL, will need PICC line eventually     Code status: FULL  DVT prophylaxis:SCD/ambulatory    Care Plan discussed with: Patient/Family  Anticipated Disposition: Home w/Family  Anticipated Discharge: Greater than 48 hours Antibiotics until 4/18/2021     Hospital Problems  Never Reviewed          Codes Class Noted POA    Cerebrovascular accident (CVA) due to embolism of precerebral artery (HCC) ICD-10-CM: I63.10  ICD-9-CM: 434.11  3/6/2021 Unknown        Sepsis (HCC) ICD-10-CM: A41.9  ICD-9-CM: 038.9, 995.91   2/28/2021 Unknown              Review of Systems:     Negative unless stated above    Vital Signs:    Last 24hrs VS reviewed since prior progress note. Most recent are:  Visit Vitals  /77 (BP 1 Location: Right upper arm, BP Patient Position: At rest)   Pulse (!) 114   Temp 98.9 °F (37.2 °C)   Resp 16   Ht 5' 6\" (1.676 m)   Wt 47.6 kg (105 lb)   SpO2 99%   BMI 16.95 kg/m²         Intake/Output Summary (Last 24 hours) at 3/15/2021 1410  Last data filed at 3/15/2021 0900  Gross per 24 hour   Intake 240 ml   Output    Net 240 ml        Physical Examination:     I had a face to face encounter with this patient and independently examined them on 03/15/21 as outlined below:        Constitutional:  AAOX3, awake, anxious, thin appearing    ENT:  Oral mucosa moist, oropharynx benign. Resp:  CTA bilaterally. No wheezing/rhonchi/rales. No accessory muscle use   CV:  tachycardic, 2/6 systolic murmur heard at the right upper sternal border    GI:  Soft, non distended, non tender. normoactive bowel sounds, no hepatosplenomegaly     Musculoskeletal:  Moving around in the bed    Neurologic: Alert and oriented             Data Review:    Review and/or order of clinical lab test      Labs:     Recent Labs     03/15/21  0232   WBC 6.9   HGB 7.6*   HCT 24.0*   *     Recent Labs     03/15/21  0232   *   K 4.1      CO2 33*   BUN 11   CREA 0.56   *   CA 9.3     No results for input(s): ALT, AP, TBIL, TBILI, TP, ALB, GLOB, GGT, AML, LPSE in the last 72 hours. No lab exists for component: SGOT, GPT, AMYP, HLPSE  No results for input(s): INR, PTP, APTT, INREXT, INREXT in the last 72 hours. No results for input(s): FE, TIBC, PSAT, FERR in the last 72 hours. No results found for: FOL, RBCF   No results for input(s): PH, PCO2, PO2 in the last 72 hours. No results for input(s): CPK, CKNDX, TROIQ in the last 72 hours.     No lab exists for component: CPKMB  Lab Results   Component Value Date/Time Cholesterol, total 74 03/07/2021 04:42 AM    HDL Cholesterol 21 03/07/2021 04:42 AM    LDL, calculated 38.6 03/07/2021 04:42 AM    Triglyceride 72 03/07/2021 04:42 AM    CHOL/HDL Ratio 3.5 03/07/2021 04:42 AM     Lab Results   Component Value Date/Time    Glucose (POC) 132 (H) 03/03/2021 05:27 AM    Glucose (POC) 118 (H) 03/03/2021 12:18 AM     Lab Results   Component Value Date/Time    Color DARK YELLOW 02/28/2021 06:12 PM    Appearance CLEAR 02/28/2021 06:12 PM    Specific gravity 1.020 02/28/2021 06:12 PM    Specific gravity 1.016 02/28/2021 06:00 AM    pH (UA) 6.5 02/28/2021 06:12 PM    Protein 100 (A) 02/28/2021 06:12 PM    Glucose Negative 02/28/2021 06:12 PM    Ketone 15 (A) 02/28/2021 06:12 PM    Bilirubin Negative 02/28/2021 06:00 AM    Urobilinogen 2.0 (H) 02/28/2021 06:12 PM    Nitrites Positive (A) 02/28/2021 06:12 PM    Leukocyte Esterase MODERATE (A) 02/28/2021 06:12 PM    Epithelial cells FEW 02/28/2021 06:12 PM    Bacteria 1+ (A) 02/28/2021 06:12 PM    WBC 20-50 02/28/2021 06:12 PM    RBC >100 (H) 02/28/2021 06:12 PM         Medications Reviewed:     Current Facility-Administered Medications   Medication Dose Route Frequency    ALPRAZolam (XANAX) tablet 1 mg  1 mg Oral BID PRN    hydrOXYzine HCL (ATARAX) tablet 50 mg  50 mg Oral TID PRN    acetaminophen (TYLENOL) tablet 650 mg  650 mg Oral Q4H PRN    Or    acetaminophen (TYLENOL) suppository 650 mg  650 mg Rectal Q4H PRN    ibuprofen (MOTRIN) tablet 600 mg  600 mg Oral Q6H PRN    multivitamin, tx-iron-ca-min (THERA-M w/ IRON) tablet 1 Tab  1 Tab Oral DAILY    ceFAZolin (ANCEF) 2 g in sterile water (preservative free) 20 mL IV syringe  2 g IntraVENous Q8H    melatonin tablet 3 mg  3 mg Oral QHS PRN    nicotine (NICODERM CQ) 21 mg/24 hr patch 1 Patch  1 Patch TransDERmal DAILY    influenza vaccine 2020-21 (6 mos+)(PF) (FLUARIX/FLULAVAL/FLUZONE QUAD) injection 0.5 mL  0.5 mL IntraMUSCular PRIOR TO DISCHARGE    buprenorphine-naloxone (SUBOXONE) 8-2mg SL tablet  1 Tab SubLINGual BID    sodium chloride (NS) flush 5-40 mL  5-40 mL IntraVENous Q8H    sodium chloride (NS) flush 5-40 mL  5-40 mL IntraVENous PRN    polyethylene glycol (MIRALAX) packet 17 g  17 g Oral DAILY PRN    promethazine (PHENERGAN) tablet 12.5 mg  12.5 mg Oral Q6H PRN    Or    ondansetron (ZOFRAN) injection 4 mg  4 mg IntraVENous Q6H PRN     ______________________________________________________________________  EXPECTED LENGTH OF STAY: 4d 19h  ACTUAL LENGTH OF STAY:          [address] [facility] DO Anaya

## 2021-03-15 NOTE — PROGRESS NOTES
03/15/21 0847   Vital Signs   Temp 98.9 °F (37.2 °C)   Temp Source Oral   Pulse (Heart Rate) (!) 114   Heart Rate Source Monitor   Resp Rate 16   O2 Sat (%) 99 %   Level of Consciousness Alert   /77   MAP (Calculated) 90   BP 1 Method Automatic   BP 1 Location Right upper arm   BP Patient Position At rest   MEWS Score 3   Oxygen Therapy   O2 Device Room air   MD aware pt tachy

## 2021-03-15 NOTE — PROGRESS NOTES
Bedside shift change report given to Andrew Guzman (oncoming nurse) by Roberto Carlos Lewis (offgoing nurse). Report included the following information SBAR and Kardex.

## 2021-03-15 NOTE — CONSULTS
Psychiatry Consult Follow Up Note    Reason for consult: anxiety and agitation  Please see full consult note written on 3/9/21      CHIEF COMPLAINT: \"A little anxious. \"    INTERVAL HISTORY:  Hebert Rome appears better today compared to when I saw her last. She is calm and pleasant. She tells me haldol was working well working but had to be dc'd due to prolonged qtc. Her ekg yesterday showed sinus tach with Nonspecific ST abnormality with qtc of 518. Today, qtc remain prolonged but better-481. She is restless, fidgety but pleasant. She is sleeping poorly at night. States atarax is not helping with anxiety but xanax is. She agreed to switch to klonopin. She denies si hi or avh. Dx: Unspecified anxiety disorder. Opioid use disorder, on maintenance therapy. RECCS:    Xanax is a short acting benzo which poses higher risk for benzo dependence. We will switch to klonopin, which has a longer half-life and poses lesser risk for dependence compared to xanax. I still recommend benzo overall to be used short term given patient's significant substance use history. Atarax 50 mg qid. Remeron 15 mg qhs. May increase to 30 mg qhs after 2 doses if difficulty sleeping persists.   Repeat EKG in am for qtc monitoring.    -------------------------------------------------------------------------------------------------------------------  Clinical summary of events since last encounter:    Meds:  Current Facility-Administered Medications   Medication Dose Route Frequency    clonazePAM (KlonoPIN) tablet 1 mg  1 mg Oral BID PRN    hydrOXYzine HCL (ATARAX) tablet 50 mg  50 mg Oral QID    mirtazapine (REMERON) tablet 15 mg  15 mg Oral QHS    acetaminophen (TYLENOL) tablet 650 mg  650 mg Oral Q4H PRN    Or    acetaminophen (TYLENOL) suppository 650 mg  650 mg Rectal Q4H PRN    ibuprofen (MOTRIN) tablet 600 mg  600 mg Oral Q6H PRN    multivitamin, tx-iron-ca-min (THERA-M w/ IRON) tablet 1 Tab  1 Tab Oral DAILY    ceFAZolin (ANCEF) 2 g in sterile water (preservative free) 20 mL IV syringe  2 g IntraVENous Q8H    melatonin tablet 3 mg  3 mg Oral QHS PRN    nicotine (NICODERM CQ) 21 mg/24 hr patch 1 Patch  1 Patch TransDERmal DAILY    influenza vaccine 2020-21 (6 mos+)(PF) (FLUARIX/FLULAVAL/FLUZONE QUAD) injection 0.5 mL  0.5 mL IntraMUSCular PRIOR TO DISCHARGE    buprenorphine-naloxone (SUBOXONE) 8-2mg SL tablet  1 Tab SubLINGual BID    sodium chloride (NS) flush 5-40 mL  5-40 mL IntraVENous Q8H    sodium chloride (NS) flush 5-40 mL  5-40 mL IntraVENous PRN    polyethylene glycol (MIRALAX) packet 17 g  17 g Oral DAILY PRN    promethazine (PHENERGAN) tablet 12.5 mg  12.5 mg Oral Q6H PRN    Or    ondansetron (ZOFRAN) injection 4 mg  4 mg IntraVENous Q6H PRN         Vital Signs:  Blood pressure 95/65, pulse (!) 123, temperature 99.1 °F (37.3 °C), resp. rate 16, height 5' 6\" (1.676 m), weight 47.6 kg (105 lb), SpO2 99 %.     Labs: (reviewed/updated 3/15/2021)  Recent Results (from the past 24 hour(s))   CBC W/O DIFF    Collection Time: 03/15/21  2:32 AM   Result Value Ref Range    WBC 6.9 3.6 - 11.0 K/uL    RBC 2.68 (L) 3.80 - 5.20 M/uL    HGB 7.6 (L) 11.5 - 16.0 g/dL    HCT 24.0 (L) 35.0 - 47.0 %    MCV 89.6 80.0 - 99.0 FL    MCH 28.4 26.0 - 34.0 PG    MCHC 31.7 30.0 - 36.5 g/dL    RDW 13.6 11.5 - 14.5 %    PLATELET 693 (H) 951 - 400 K/uL    MPV 8.5 (L) 8.9 - 12.9 FL    NRBC 0.0 0  WBC    ABSOLUTE NRBC 0.00 0.00 - 1.82 K/uL   METABOLIC PANEL, BASIC    Collection Time: 03/15/21  2:32 AM   Result Value Ref Range    Sodium 135 (L) 136 - 145 mmol/L    Potassium 4.1 3.5 - 5.1 mmol/L    Chloride 100 97 - 108 mmol/L    CO2 33 (H) 21 - 32 mmol/L    Anion gap 2 (L) 5 - 15 mmol/L    Glucose 103 (H) 65 - 100 mg/dL    BUN 11 6 - 20 MG/DL    Creatinine 0.56 0.55 - 1.02 MG/DL    BUN/Creatinine ratio 20 12 - 20      GFR est AA >60 >60 ml/min/1.73m2    GFR est non-AA >60 >60 ml/min/1.73m2    Calcium 9.3 8.5 - 10.1 MG/DL   EKG, 12 LEAD, INITIAL    Collection Time: 03/15/21  7:12 AM   Result Value Ref Range    Ventricular Rate 115 BPM    Atrial Rate 115 BPM    P-R Interval 154 ms    QRS Duration 86 ms    Q-T Interval 348 ms    QTC Calculation (Bezet) 481 ms    Calculated P Axis 61 degrees    Calculated R Axis 98 degrees    Calculated T Axis 64 degrees    Diagnosis       Sinus tachycardia  Nonspecific ST abnormality  Rightward axis  Abnormal ECG  When compared with ECG of 14-MAR-2021 08:18,  No significant change was found  Confirmed by Mario Ferraro MD ([id number]) on 3/15/2021 3:08:45 PM       No results found for: VALF2, VALAC, VALP, VALPR, DS6, CRBAM, CRBAMP, CARB2, XCRBAM  No results found for: [facility]    Radiology (reviewed/updated 3/15/2021)  Mri Brain Wo Cont    Result Date: 3/4/2021  EXAM:  MRI BRAIN WO CONT INDICATION:  septic emboli, TECHNIQUE: Sagittal T1, axial FLAIR, T2,T1 and gradient echo images as well as coronal T2 weighted images and axial diffusion weighted images of the head were obtained. COMPARISON:  CT head 10/28/21 FINDINGS: There are multiple foci of T2 hyperintensity in the cerebral white matter predominantly periventricular and some possibly pericallosal in a nonspecific pattern. There is no definitive associated restricted diffusion in these foci although there is suggested minimal T2 shine through. There are several punctate foci of restricted diffusion in the right greater than left posterior frontal and parietal lobes near the gray-white junction. This is in a pattern which would be consistent with emboli in this patient with known cardiac valve vegetation. A few tiny punctate foci of hypointensity possible on gradient echo. SWI was not performed. Ventricular size and configuration are normal. No definite acute intracranial hemorrhage, mass or abnormal extra-axial fluid collections. Flow voids are present in vertebral basilar and carotid artery systems. Mild mucosal thickening paranasal sinuses.      1. Multiple punctate foci restricted diffusion and some associated T2 hyperintensity gray-white junction greater in the right hemisphere suggesting emboli. 2. Other multifocal white matter T2 hyperintensity may be more chronic and of uncertain etiology. Ct Head Wo Cont    Result Date: 2/28/2021  Axial images from the skull base to the vertex were obtained without the use of contrast. Bone windows were reviewed. Sagittal and coronal reformatted images were also reviewed. All CT scans at this facility are performed using dose reduction optimization techniques as appropriate to a performed exam including the following:   automated exposure control, adjustments of the mA and/or kV according to patient size, or use of iterative reconstruction technique. INDICATION: Headache. There is no evidence of hemorrhage. No mass or mass effect is seen. There is no acute ischemia. Ventricles, sulci and cisterns are intact. Normal gray matter/white matter differentiation. Bone windows show no acute abnormalities. The visualized portions of the orbits, paranasal sinuses and mastoid air cells are unremarkable. Review of the reconstructed images show no additional findings. Negative CT of the brain. Cta Chest W Or W Wo Cont    Result Date: 2/28/2021  EXAM: CTA CHEST W OR W WO CONT INDICATION: eval for septic emboli COMPARISON: None. CONTRAST: 80 mL of Isovue-370. TECHNIQUE: Precontrast  images were obtained to localize the volume for acquisition. Multislice helical CT arteriography was performed from the diaphragm to the thoracic inlet during uneventful rapid bolus intravenous contrast administration. Lung and soft tissue windows were generated. Coronal and sagittal images were generated and 3D post processing consisting of coronal maximum intensity images was performed. CT dose reduction was achieved through use of a standardized protocol tailored for this examination and automatic exposure control for dose modulation.  FINDINGS: The lungs are clear of mass, nodule, airspace disease or edema. There is opacification of peripheral lower lobe airways bilaterally. The pulmonary arteries are well enhanced and no pulmonary emboli are identified. There is no mediastinal or hilar adenopathy or mass. The aorta enhances normally without evidence of aneurysm or dissection. The visualized portions of the upper abdominal organs are normal.     No pulmonary process or septic emboli. Lower lobe branch bronchial opacification likely reflecting  secretions or aspiration. Ct Abd Pelv W Cont    Result Date: 3/2/2021  EXAM: CT ABD PELV W CONT INDICATION: diarrhea and severe sepsis COMPARISON: No comparisons CONTRAST: 100 mL of Isovue-370. TECHNIQUE: Following the uneventful intravenous administration of contrast, thin axial images were obtained through the abdomen and pelvis. Coronal and sagittal reconstructions were generated. Oral contrast was not administered. CT dose reduction was achieved through use of a standardized protocol tailored for this examination and automatic exposure control for dose modulation. FINDINGS: Motion artifact limits evaluation LOWER THORAX: Left lower lobe airspace disease with a small left pleural effusion. LIVER: No mass. BILIARY TREE: Gallbladder is within normal limits. CBD is not dilated. SPLEEN: Borderline enlarged spleen PANCREAS: No mass or ductal dilatation. ADRENALS: Unremarkable. KIDNEYS: No mass, calculus, or hydronephrosis. STOMACH: Unremarkable. SMALL BOWEL: No dilatation or wall thickening. COLON: No dilatation or wall thickening. APPENDIX: Unremarkable PERITONEUM: Free fluid in the pelvis RETROPERITONEUM: No lymphadenopathy or aortic aneurysm. REPRODUCTIVE ORGANS: Unremarkable URINARY BLADDER: Distended urinary bladder BONES: No destructive bone lesion. ABDOMINAL WALL: No mass or hernia. ADDITIONAL COMMENTS: N/A     1. Limited by motion. 2.  Left lower lobe airspace disease and left effusion suspicious for pneumonia.  3. Distended urinary bladder. 4.  Free fluid in the pelvis. No evidence of bowel obstruction or free intraperitoneal air    Xr Chest Port    Result Date: 3/3/2021  INDICATION: . post line placement Additional history: COMPARISON: CT of the chest, 2/28/2021. LIMITATIONS: Portable technique. Bula Dayhoff FINDINGS: Single frontal view of the chest. . Lines/tubes/surgical: A right IJ approach catheter projects to terminate in the distal SVC. Heart/mediastinum: Unremarkable. Lungs/pleura: Interstitial prominence. No visible pneumothorax. Additional Comments: None. .    1. A right IJ approach catheter projects to terminate in the distal SVC. 2. Interstitial prominence, nonspecific which may represent pulmonary vascular congestion. Echo Adult Complete    Result Date: 3/3/2021  · LV: Calculated LVEF is 71%. Normal cavity size, wall thickness, systolic function (ejection fraction normal) and diastolic function. Wall motion: normal. Normal left ventricular strain. · Image quality for this study was suboptimal. · MV: Mitral valve non-specific thickening. Mild mitral valve prolapse of the posterior leaflet. Severe mitral valve regurgitation is present. Possible vegetation present on the posterior leaflet of the mitral valve. Posterior mitral leaflet vegetation is moderately sized. Vegetation is localized on the atrial aspect of the posterior mitral leaflet. Mental Status Exam:  General appearance:   Hebert Rome is a 31 y.o. WHITE female who is well groomed, psychomotor activity is WNL  Eye contact: makes good eye contact  Speech: Spontaneous and coherent  Affect : Euthymic  Mood: \"OK\"  Thought Process: Logical, goal directed  Perception: Denies any AH or VH. Thought Content: Denies any SI or Plan  Insight: Partial  Judgement: Poor  Cognition: Intact grossly.              Total Patient Care Time Spent: 35 minutes : (Coordinated treatment team rounds conducted with patient present; discussions with nurses, , pharmacist,  held; counseling time with patient, individual psychotherapy with patient; and discussions with family members; chart reviewed in full including consultant notes, ancillary staff notes, vitals and labs reviewed in full).     Greater than 50% of total patient care time included counseling    Signed:  Lara Cotto NP  3/15/2021

## 2021-03-16 LAB
ATRIAL RATE: 114 BPM
CALCULATED P AXIS, ECG09: 68 DEGREES
CALCULATED R AXIS, ECG10: 97 DEGREES
CALCULATED T AXIS, ECG11: 64 DEGREES
DIAGNOSIS, 93000: NORMAL
P-R INTERVAL, ECG05: 160 MS
Q-T INTERVAL, ECG07: 338 MS
QRS DURATION, ECG06: 84 MS
QTC CALCULATION (BEZET), ECG08: 465 MS
VENTRICULAR RATE, ECG03: 114 BPM

## 2021-03-16 PROCEDURE — 74011250637 HC RX REV CODE- 250/637: Performed by: NURSE PRACTITIONER

## 2021-03-16 PROCEDURE — 74011250636 HC RX REV CODE- 250/636: Performed by: INTERNAL MEDICINE

## 2021-03-16 PROCEDURE — 74011250637 HC RX REV CODE- 250/637: Performed by: INTERNAL MEDICINE

## 2021-03-16 PROCEDURE — 93005 ELECTROCARDIOGRAM TRACING: CPT

## 2021-03-16 PROCEDURE — 74011000250 HC RX REV CODE- 250: Performed by: INTERNAL MEDICINE

## 2021-03-16 PROCEDURE — 99232 SBSQ HOSP IP/OBS MODERATE 35: CPT | Performed by: INTERNAL MEDICINE

## 2021-03-16 PROCEDURE — 65270000029 HC RM PRIVATE

## 2021-03-16 RX ADMIN — CLONAZEPAM 1 MG: 1 TABLET ORAL at 20:20

## 2021-03-16 RX ADMIN — MULTIPLE VITAMINS W/ MINERALS TAB 1 TABLET: TAB at 07:54

## 2021-03-16 RX ADMIN — ACETAMINOPHEN 650 MG: 325 TABLET ORAL at 15:17

## 2021-03-16 RX ADMIN — BUPRENORPHINE HYDROCHLORIDE AND NALOXONE HYDROCHLORIDE DIHYDRATE 1 TABLET: 8; 2 TABLET SUBLINGUAL at 17:38

## 2021-03-16 RX ADMIN — HYDROXYZINE HYDROCHLORIDE 50 MG: 25 TABLET, FILM COATED ORAL at 07:54

## 2021-03-16 RX ADMIN — IBUPROFEN 600 MG: 600 TABLET, FILM COATED ORAL at 12:02

## 2021-03-16 RX ADMIN — Medication 10 ML: at 15:18

## 2021-03-16 RX ADMIN — HYDROXYZINE HYDROCHLORIDE 50 MG: 25 TABLET, FILM COATED ORAL at 21:32

## 2021-03-16 RX ADMIN — WATER 2 G: 1 INJECTION INTRAMUSCULAR; INTRAVENOUS; SUBCUTANEOUS at 15:17

## 2021-03-16 RX ADMIN — WATER 2 G: 1 INJECTION INTRAMUSCULAR; INTRAVENOUS; SUBCUTANEOUS at 05:49

## 2021-03-16 RX ADMIN — WATER 2 G: 1 INJECTION INTRAMUSCULAR; INTRAVENOUS; SUBCUTANEOUS at 21:31

## 2021-03-16 RX ADMIN — MIRTAZAPINE 15 MG: 15 TABLET, FILM COATED ORAL at 21:32

## 2021-03-16 RX ADMIN — BUPRENORPHINE HYDROCHLORIDE AND NALOXONE HYDROCHLORIDE DIHYDRATE 1 TABLET: 8; 2 TABLET SUBLINGUAL at 07:54

## 2021-03-16 RX ADMIN — ACETAMINOPHEN 650 MG: 325 TABLET ORAL at 07:54

## 2021-03-16 RX ADMIN — Medication 10 ML: at 05:50

## 2021-03-16 RX ADMIN — IBUPROFEN 600 MG: 600 TABLET, FILM COATED ORAL at 20:20

## 2021-03-16 RX ADMIN — CLONAZEPAM 1 MG: 1 TABLET ORAL at 07:54

## 2021-03-16 RX ADMIN — HYDROXYZINE HYDROCHLORIDE 50 MG: 25 TABLET, FILM COATED ORAL at 17:38

## 2021-03-16 RX ADMIN — HYDROXYZINE HYDROCHLORIDE 50 MG: 25 TABLET, FILM COATED ORAL at 12:02

## 2021-03-16 RX ADMIN — Medication 10 ML: at 22:00

## 2021-03-16 NOTE — PROGRESS NOTES
Referral sent to [facility] for possible transfer.      Monica Mendez, [address]

## 2021-03-16 NOTE — PROGRESS NOTES
Infectious Disease Progress Note          HPI  Ms Delbert Nuñez seen   Says she has pain all over when she wakes up  Pain improves with walking around and once pain medications kick in  Denied fever, chills, nausea, vomiting diarrhea                Physical Exam:    Vitals:   Patient Vitals for the past 24 hrs:   Temp Pulse Resp BP SpO2   03/16/21 0830 98.7 °F (37.1 °C) (!) 118 16 111/62 99 %   03/16/21 0311 97.6 °F (36.4 °C) 96 16 106/66 97 %   03/15/21 2000 98.5 °F (36.9 °C) (!) 117 16 111/75 97 %   03/15/21 1516 99.1 °F (37.3 °C) (!) 123 16 95/65 99 %     · GEN NAD  · HEENT: no scleral icterus, no thrush, poor dentition  + R IJ  · CV: S1, S2 heard ,  · Lungs:  Clear bilaterally   · Abdomen: soft, non distended, non tender  · Extremities: no edema noted   · Skin no rash  · MSK non tender to palpation of cervical , thoracic, lumbar area , hips, knees   ·   Labs:   Recent Results (from the past 24 hour(s))   EKG, 12 LEAD, INITIAL    Collection Time: 03/16/21  9:56 AM   Result Value Ref Range    Ventricular Rate 114 BPM    Atrial Rate 114 BPM    P-R Interval 160 ms    QRS Duration 84 ms    Q-T Interval 338 ms    QTC Calculation (Bezet) 465 ms    Calculated P Axis 68 degrees    Calculated R Axis 97 degrees    Calculated T Axis 64 degrees    Diagnosis       Sinus tachycardia  Rightward axis  Borderline ECG  When compared with ECG of 15-MAR-2021 07:12,  No significant change was found  Confirmed by Jimmy Perez ([id number]) on 3/16/2021 11:06:55 AM         Microbiology Data:       Blood: 2/28/21  Specimen Information: Blood        Component Value Flag Ref Range Units Status   Special Requests:      Final   No Special Requests    Culture result: Abnormal       Final   Staphylococcus aureus growing in all 4 bottles drawn    Culture result:      Final   Gram pos cocci in clusters   growing in all 4 bottles drawn   [address] [place] [address] on 3.1.21 at 12:13 by tlw    Susceptibility    Staphylococcus aureus    Antibiotic Interpretation Value Method Comment   Clindamycin ($) Resistant Resistant ug/mL ANGELIKA    Ciprofloxacin ($) Susceptible <=0.5 ug/mL ANGELIKA    Daptomycin ($$$$$) Susceptible 0.25 ug/mL ANGELIKA    Doxycycline ($$) Susceptible <=0.5 ug/mL ANGELIKA    Erythromycin ($$$$) Resistant >=8 ug/mL ANGELIKA    Gentamicin ($) Susceptible <=0.5 ug/mL ANGELIKA    Levofloxacin ($) Susceptible 0.25 ug/mL ANGELIKA    Linezolid ($$$$$) Susceptible 2 ug/mL ANGELIKA    Moxifloxacin ($$$$) Susceptible <=0.25 ug/mL ANGELIKA    Oxacillin Susceptible 0.5 ug/mL ANGELIKA    Rifampin ($$$$) Susceptible <=0.5 ug/mL ANGELIKA Rifampin is not to be used for mono-therapy. Tetracycline Susceptible <=1 ug/mL ANGELIKA    Trimeth/Sulfa Susceptible <=10 ug/mL ANGELIKA    Vancomycin ($) Susceptible 1 ug/mL ANGELIKA        Blood 3/4/21  Special Requests:      Preliminary   NO SPECIAL REQUESTS    Culture result: Abnormal       Preliminary   GRAM POSITIVE COCCI IN CLUSTERS GROWING IN 2 OF 4 BOTTLES DRAWN (SITE = L. AC AND R HAND)    Culture result:      Preliminary   REMAINING BOTTLE(S) HAS/HAVE NO GROWTH SO FAR      Blood 3/5/21  Specimen Information: Blood        Component Value Flag Ref Range Units Status   Special Requests:      Preliminary   NO SPECIAL REQUESTS    Culture result: Abnormal       Preliminary   GRAM POSITIVE COCCI IN CLUSTERS GROWING IN 1 OF 4 BOTTLES DRAWN . [employer] LEFT ARM SITE    Culture result:      Preliminary   REMAINING BOTTLE(S) HAS/HAVE NO GROWTH SO FAR           Blood 3/7/21  Component Value Ref Range & Units Status   Special Requests: NO SPECIAL REQUESTS    Preliminary   Culture result: NO GROWTH 3 DAYS    Preliminary   Result History          Urine:  Special Requests:      Final   NO SPECIAL REQUESTS    [place] Count >100,000   COLONIES/mL      Final   Culture result: Abnormal       Final   STAPHYLOCOCCUS AUREUS    Susceptibility    Staphylococcus aureus    Antibiotic Interpretation Value Method Comment   Ciprofloxacin ($) Susceptible <=0.5 ug/mL ANGELIKA    Gentamicin ($) Susceptible <=0.5 ug/mL ANGELIKA    Nitrofurantoin Susceptible <=16 ug/mL ANGELIKA    Oxacillin Susceptible 0.5 ug/mL ANGELIKA    Rifampin ($$$$) Susceptible <=0.5 ug/mL ANGELIKA Rifampin is not to be used for mono-therapy. Tetracycline Susceptible <=1 ug/mL ANGELIKA    Trimeth/Sulfa Susceptible <=10 ug/mL ANGELIKA    Vancomycin ($) Susceptible 1 ug/mL ANGELIKA    Levofloxacin ($) Susceptible 0.5 ug/mL ANGELIKA    Linezolid ($$$$$) Susceptible 2 ug/mL ANGELIKA    Daptomycin ($$$$$) Susceptible 0.25 ug/mL ANGELIKA    Moxifloxacin ($$$$) Susceptible <=0.25 ug/mL ANGELIKA    Doxycycline ($$) Susceptible <=0.5 ug/mL ANGELIKA        Component Value Flag Ref Range Units Status   GDH ANTIGEN Negative   NEG   Final   C. difficile toxin Negative   NEG   Final   INTERPRETATION   NTXCD   Final   NEGATIVE FOR TOXIGENIC C. DIFFICILE                Imaging:   CT chest  FINDINGS:  The lungs are clear of mass, nodule, airspace disease or edema. There is  opacification of peripheral lower lobe airways bilaterally.     The pulmonary arteries are well enhanced and no pulmonary emboli are identified.     There is no mediastinal or hilar adenopathy or mass. The aorta enhances normally  without evidence of aneurysm or dissection.     The visualized portions of the upper abdominal organs are normal.     IMPRESSION  No pulmonary process or septic emboli. Lower lobe branch bronchial  opacification likely reflecting  secretions or aspiration. CT ABD  FINDINGS: Motion artifact limits evaluation  LOWER THORAX: Left lower lobe airspace disease with a small left pleural  effusion. LIVER: No mass. BILIARY TREE: Gallbladder is within normal limits. CBD is not dilated. SPLEEN: Borderline enlarged spleen  PANCREAS: No mass or ductal dilatation. ADRENALS: Unremarkable. KIDNEYS: No mass, calculus, or hydronephrosis. STOMACH: Unremarkable. SMALL BOWEL: No dilatation or wall thickening. COLON: No dilatation or wall thickening.   APPENDIX: Unremarkable  PERITONEUM: Free fluid in the pelvis  RETROPERITONEUM: No lymphadenopathy or aortic aneurysm. REPRODUCTIVE ORGANS: Unremarkable  URINARY BLADDER: Distended urinary bladder  BONES: No destructive bone lesion. ABDOMINAL WALL: No mass or hernia. ADDITIONAL COMMENTS: N/A     IMPRESSION  1. Limited by motion.     2.  Left lower lobe airspace disease and left effusion suspicious for pneumonia.     3. Distended urinary bladder.     4. Free fluid in the pelvis. No evidence of bowel obstruction or free  intraperitoneal air    TTE  · LV: Calculated LVEF is 71%. Normal cavity size, wall thickness, systolic function (ejection fraction normal) and diastolic function. Wall motion: normal. Normal left ventricular strain. · Image quality for this study was suboptimal.  · MV: Mitral valve non-specific thickening. Mild mitral valve prolapse of the posterior leaflet. Severe mitral valve regurgitation is present. Possible vegetation present on the posterior leaflet of the mitral valve. Posterior mitral leaflet vegetation is moderately sized. Vegetation is localized on the atrial aspect of the posterior mitral leaflet. Echo Findings    Left Ventricle Normal cavity size, wall thickness, systolic function (ejection fraction normal) and diastolic function. The muscle mass is normal. The cavity shape is normal. Wall motion: normal. The calculated EF is 71%. End-systolic volume is normal. Normal left ventricular strain. Normal left ventricular diastolic pressure. End-diastolic volume is normal.   Left Atrium Normal cavity size. Right Ventricle Normal cavity size, wall thickness and global systolic function. Right Atrium Normal cavity size. Aortic Valve Aortic valve not well visualized. No stenosis and no regurgitation. Mitral Valve Mitral valve not well visualized. No stenosis. Mitral valve non-specific thickening. Mild mitral valve prolapse of the posterior leaflet. Severe regurgitation. The mitral regurgitant jet is eccentric.  There is possible vegetation on the posterior leaflet of the mitral valve. Posterior leaflet vegetation is moderately sized. The vegetation is localized on the atrial aspect of the posterior leaflet. Tricuspid Valve Normal valve structure, no stenosis and no regurgitation. Pulmonic Valve Normal valve structure, no stenosis and no regurgitation. Aorta Normal aortic root, ascending aortic, and aortic arch. Pericardium Normal pericardium and no evidence of pericardial effusion       MRI     FINDINGS:  There are multiple foci of T2 hyperintensity in the cerebral white matter  predominantly periventricular and some possibly pericallosal in a nonspecific  pattern. There is no definitive associated restricted diffusion in these foci  although there is suggested minimal T2 shine through. There are several punctate foci of restricted diffusion in the right greater  than left posterior frontal and parietal lobes near the gray-white junction. This is in a pattern which would be consistent with emboli in this patient with  known cardiac valve vegetation. A few tiny punctate foci of hypointensity  possible on gradient echo. SWI was not performed. Ventricular size and  configuration are normal. No definite acute intracranial hemorrhage, mass or  abnormal extra-axial fluid collections. Flow voids are present in vertebral  basilar and carotid artery systems. Mild mucosal thickening paranasal sinuses.     IMPRESSION  1. Multiple punctate foci restricted diffusion and some associated T2  hyperintensity gray-white junction greater in the right hemisphere suggesting  emboli. 2. Other multifocal white matter T2 hyperintensity may be more chronic and of  uncertain etiology.         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 Blood cx 3/7 negative. Repeat blood cx if febrile or clinical worsening     Renally dosed Cefazolin with plan for 6 weeks till  4/18/21 in a monitored setting    Seen  by CTS ( KELLY per CTS)   MRI brain with \"  Multiple punctate foci restricted diffusion and some associated T2 hyperintensity gray-white junction greater in the right hemisphere suggesting emboli\"   Rhinovirus and Enterovirus detected on respiratory panel -- supportive care   Resources for addiction medicine per primary team    Dental care ideal to be done while on IV antibiotic therapy   Seen by psychiatry and noted on meds that can prolong QT- monitor Qt         Thank for the opportunity to participate in the care of this patient. Please contact with questions or concerns.            Zena Isbell DO  11:54 AM

## 2021-03-16 NOTE — PROGRESS NOTES
[address] [facility] Adult  Hospitalist Group                                                                                          Hospitalist Progress Note  [address] [facility], [place]  Answering service: [phone] OR [phone] from in house phone        Date of Service:  3/16/2021  NAME:  Abril Epstein  :  1990  MRN:  [id number]      Please note that this dictation was completed with [employer], the computer voice recognition software. Quite often unanticipated grammatical, syntax, homophones, and other interpretive errors are inadvertently transcribed by the computer software. Please disregard these errors. Please excuse any errors that have escaped final proofreading. Admission Summary:   Patient with altered mental status not willing and unable to provide information. Mother reports she is adult but lives at her house. Not sure when she used any type of IV drugs last time. Possibly yesterday. Reports she has been dealing with IV drug abuse for many years. Apparently after an . Mother reports she has been multiple times in, due to narcotic overdose, reversed by nasal Narcan. She also has history of amphetamine use, unknown last use. Last 4 days unable to ambulate. She has been suffering from severe myalgia and fever occasional cough, negative for Covid no exposure. Mother reports she was negative as well. Patient and mother report, never had similar symptoms in the past.  She takes Suboxone and Xanax as needed for anxiety. Not have any other prescriptions.       Interval history / Subjective: Follow up sepsis. Patient seen and examined. Has leg pain. Requests visitors due to [facility] [place] [facility]\"" behavior. \" Qtc improved.       Assessment & Plan:     Severe Sepsis due to MSSA bacteremia POA in setting of IVDU  Mitral valve Infective endocarditis  Cerebral Septic embolic  -ID following  -Continues cefazolin x 6 week from previous cleared blood culture, tentative date 2021  --Recent blood culture from 3/7/2021 NGTD  -MRI brain with multiple punctate foci restricted diffusion in T2 hyperintensity, suggesting emboli  --holding prophylactic lovenox due to concerns for hemorrhagic conversion     Polysubstance with possible benzo/ heroin withdrawal and/or amphetamine overdose  Anxiety, persistent  -continue home suboxone, lidocaine patch as needed  -appreciate psychiatry- initiated on klonopin, remeron, atarax QID scheduled       Rhinovirus:   -detected on viral PCR, droplet precautions    Altered mental status, multifactorial: resolved  -Encephalopathy from sepsis and withdrawal from drugs, heroin withdrawal and benzo withdrawal, and embolic events  -avoid benzodiazepines due to overuse/previous over sedation and addictive effects       Anemia:   -Etiology unclear, monitor closely, patient defers blood transfusion at this time    Hypokalemia  -replete as needed    Thrombocytopenia now with thrombocytosis  -Monitor, likely reactive    Diarrhea:   -work up negative    Hypernatremia   -resolved    Body mass index is 18.64 kg/m². Patient unable to have visitors due to concerns for bringing in controlled substances. Patient has requested visitors. Discussed with nursing and will ask security if     Has CVL, will need PICC line eventually     Code status: FULL  DVT prophylaxis:SCD/ambulatory    Care Plan discussed with: Patient/Family  Anticipated Disposition: Home w/Family  Anticipated Discharge: Greater than 48 hours Antibiotics until 4/18/2021     Hospital Problems  Never Reviewed          Codes Class Noted POA    Cerebrovascular accident (CVA) due to embolism of precerebral artery ([facility] [address] ICD-10-CM: I63.10  ICD-9-CM: 434.11  3/6/2021 Unknown        Sepsis ([facility] [address] ICD-10-CM: A41.9  ICD-9-CM: 038.9, 995.91  2/28/2021 Unknown              Review of Systems:     Negative unless stated above    Vital Signs:    Last 24hrs VS reviewed since prior progress note.  Most recent are:  Visit Vitals  /62 (BP 1 Location: Right upper arm, BP Patient Position: At rest)   Pulse (!) 118   Temp 98.7 °F (37.1 °C)   Resp 16   Ht 5' 6\" (1.676 m)   Wt 52.4 kg (115 lb 8 oz)   SpO2 99%   BMI 18.64 kg/m²         Intake/Output Summary (Last 24 hours) at 3/16/2021 1611  Last data filed at 3/16/2021 0930  Gross per 24 hour   Intake 400 ml   Output    Net 400 ml        Physical Examination:     I had a face to face encounter with this patient and independently examined them on 03/16/21 as outlined below:        Constitutional:  AAOX3, awake, anxious, thin appearing    ENT:  Oral mucosa moist, oropharynx benign. Resp:  CTA bilaterally. No wheezing/rhonchi/rales. No accessory muscle use   CV:  tachycardic, 2/6 systolic murmur heard at the right upper sternal border    GI:  Soft, non distended, non tender. normoactive bowel sounds, no hepatosplenomegaly     Musculoskeletal:  Moving around in the bed    Neurologic: Alert and oriented             Data Review:    Review and/or order of clinical lab test      Labs:     Recent Labs     03/15/21  0232   WBC 6.9   HGB 7.6*   HCT 24.0*   *     Recent Labs     03/15/21  0232   *   K 4.1      CO2 33*   BUN 11   CREA 0.56   *   CA 9.3     No results for input(s): ALT, AP, TBIL, TBILI, TP, ALB, GLOB, GGT, AML, LPSE in the last 72 hours. No lab exists for component: SGOT, GPT, AMYP, HLPSE  No results for input(s): INR, PTP, APTT, INREXT, INREXT in the last 72 hours. No results for input(s): FE, TIBC, PSAT, FERR in the last 72 hours. No results found for: FOL, RBCF   No results for input(s): PH, PCO2, PO2 in the last 72 hours. No results for input(s): CPK, CKNDX, TROIQ in the last 72 hours.     No lab exists for component: CPKMB  Lab Results   Component Value Date/Time    Cholesterol, total 74 03/07/2021 04:42 AM    HDL Cholesterol 21 03/07/2021 04:42 AM    LDL, calculated 38.6 03/07/2021 04:42 AM    Triglyceride 72 03/07/2021 04:42 AM    CHOL/HDL Ratio 3.5 03/07/2021 04:42 AM     Lab Results   Component Value Date/Time    Glucose (POC) 132 (H) 03/03/2021 05:27 AM    Glucose (POC) 118 (H) 03/03/2021 12:18 AM     Lab Results   Component Value Date/Time    Color DARK YELLOW 02/28/2021 06:12 PM    Appearance CLEAR 02/28/2021 06:12 PM    Specific gravity 1.020 02/28/2021 06:12 PM    Specific gravity 1.016 02/28/2021 06:00 AM    pH (UA) 6.5 02/28/2021 06:12 PM    Protein 100 (A) 02/28/2021 06:12 PM    Glucose Negative 02/28/2021 06:12 PM    Ketone 15 (A) 02/28/2021 06:12 PM    Bilirubin Negative 02/28/2021 06:00 AM    Urobilinogen 2.0 (H) 02/28/2021 06:12 PM    Nitrites Positive (A) 02/28/2021 06:12 PM    Leukocyte Esterase MODERATE (A) 02/28/2021 06:12 PM    Epithelial cells FEW 02/28/2021 06:12 PM    Bacteria 1+ (A) 02/28/2021 06:12 PM    WBC 20-50 02/28/2021 06:12 PM    RBC >100 (H) 02/28/2021 06:12 PM         Medications Reviewed:     Current Facility-Administered Medications   Medication Dose Route Frequency   • clonazePAM (KlonoPIN) tablet 1 mg  1 mg Oral BID PRN   • hydrOXYzine HCL (ATARAX) tablet 50 mg  50 mg Oral QID   • mirtazapine (REMERON) tablet 15 mg  15 mg Oral QHS   • acetaminophen (TYLENOL) tablet 650 mg  650 mg Oral Q4H PRN    Or   • acetaminophen (TYLENOL) suppository 650 mg  650 mg Rectal Q4H PRN   • ibuprofen (MOTRIN) tablet 600 mg  600 mg Oral Q6H PRN   • multivitamin, tx-iron-ca-min (THERA-M w/ IRON) tablet 1 Tab  1 Tab Oral DAILY   • ceFAZolin (ANCEF) 2 g in sterile water (preservative free) 20 mL IV syringe  2 g IntraVENous Q8H   • melatonin tablet 3 mg  3 mg Oral QHS PRN   • nicotine (NICODERM CQ) 21 mg/24 hr patch 1 Patch  1 Patch TransDERmal DAILY   • influenza vaccine 2020-21 (6 mos+)(PF) (FLUARIX/FLULAVAL/FLUZONE QUAD) injection 0.5 mL  0.5 mL IntraMUSCular PRIOR TO DISCHARGE   • buprenorphine-naloxone (SUBOXONE) 8-2mg SL tablet  1 Tab SubLINGual BID   • sodium chloride (NS) flush 5-40 mL  5-40 mL IntraVENous Q8H   • sodium chloride (NS) flush 5-40 mL   5-40 mL IntraVENous PRN    polyethylene glycol (MIRALAX) packet 17 g  17 g Oral DAILY PRN    promethazine (PHENERGAN) tablet 12.5 mg  12.5 mg Oral Q6H PRN    Or    ondansetron (ZOFRAN) injection 4 mg  4 mg IntraVENous Q6H PRN     ______________________________________________________________________  EXPECTED LENGTH OF STAY: 4d 19h  ACTUAL LENGTH OF STAY:          [address], DO

## 2021-03-16 NOTE — PROGRESS NOTES
03/16/21 0830   Vital Signs   Temp 98.7 °F (37.1 °C)   Temp Source Oral   Pulse (Heart Rate) (!) 118   Heart Rate Source Monitor   Resp Rate 16   O2 Sat (%) 99 %   Level of Consciousness Alert   /62   MAP (Calculated) 78   BP 1 Method Automatic   BP 1 Location Right upper arm   BP Patient Position At rest   MEWS Score 3   Oxygen Therapy   O2 Device Room air   MD aware

## 2021-03-17 VITALS
HEART RATE: 123 BPM | WEIGHT: 115.5 LBS | RESPIRATION RATE: 18 BRPM | TEMPERATURE: 98.2 F | HEIGHT: 66 IN | DIASTOLIC BLOOD PRESSURE: 64 MMHG | OXYGEN SATURATION: 99 % | BODY MASS INDEX: 18.56 KG/M2 | SYSTOLIC BLOOD PRESSURE: 106 MMHG

## 2021-03-17 PROCEDURE — 74011250636 HC RX REV CODE- 250/636: Performed by: INTERNAL MEDICINE

## 2021-03-17 PROCEDURE — 74011250637 HC RX REV CODE- 250/637: Performed by: NURSE PRACTITIONER

## 2021-03-17 PROCEDURE — 74011250637 HC RX REV CODE- 250/637: Performed by: FAMILY MEDICINE

## 2021-03-17 PROCEDURE — 74011000250 HC RX REV CODE- 250: Performed by: INTERNAL MEDICINE

## 2021-03-17 PROCEDURE — 74011250637 HC RX REV CODE- 250/637: Performed by: INTERNAL MEDICINE

## 2021-03-17 PROCEDURE — 74011000250 HC RX REV CODE- 250

## 2021-03-17 RX ORDER — BACITRACIN 500 UNIT/G
PACKET (EA) TOPICAL
Status: COMPLETED
Start: 2021-03-17 | End: 2021-03-17

## 2021-03-17 RX ADMIN — ACETAMINOPHEN 650 MG: 325 TABLET ORAL at 05:50

## 2021-03-17 RX ADMIN — BACITRACIN: 500 OINTMENT TOPICAL at 11:00

## 2021-03-17 RX ADMIN — PROMETHAZINE HYDROCHLORIDE 12.5 MG: 25 TABLET ORAL at 05:50

## 2021-03-17 RX ADMIN — BUPRENORPHINE HYDROCHLORIDE AND NALOXONE HYDROCHLORIDE DIHYDRATE 1 TABLET: 8; 2 TABLET SUBLINGUAL at 05:49

## 2021-03-17 RX ADMIN — WATER 2 G: 1 INJECTION INTRAMUSCULAR; INTRAVENOUS; SUBCUTANEOUS at 06:00

## 2021-03-17 RX ADMIN — Medication 10 ML: at 04:04

## 2021-03-17 RX ADMIN — HYDROXYZINE HYDROCHLORIDE 50 MG: 25 TABLET, FILM COATED ORAL at 09:14

## 2021-03-17 RX ADMIN — MULTIPLE VITAMINS W/ MINERALS TAB 1 TABLET: TAB at 09:14

## 2021-03-17 NOTE — PROGRESS NOTES
03/17/21 0859   Vital Signs   Temp 98.2 °F (36.8 °C)   Temp Source Oral   Pulse (Heart Rate) (!) 123   Heart Rate Source Monitor   Resp Rate 18   O2 Sat (%) 99 %   Level of Consciousness Alert   /64   MAP (Calculated) 78   BP 1 Method Automatic   BP 1 Location Right arm   BP Patient Position At rest   MEWS Score 3   Pain 1   Pain Scale 1 Numeric (0 - 10)   Pain Intensity 1 0   Patient Stated Pain Goal 0   patient sitting on side of bed, asymptomatic

## 2021-03-17 NOTE — PROGRESS NOTES
Patient had boyfriend come and visit for one hour, PCT remained in room during visit. The boyfriend tried to pass the patient a small bag while they were hugging. Patient tried to place back in her sock. PCT asked  patient to hand over the bag and patient gave bag to PCT. PCT wrapped the bag in gloves and placed in trash. After the visit was over PCT came to get nurse. PCT asked to go back in room and retrieve the item. It was a clear back with a small red and white label on it. A brown substance was in the bag. Nurse notified supervisor, security, and nurse manager. Nurse called  in ED and he came and confiscated bag.

## 2021-03-17 NOTE — PROGRESS NOTES
Patient up ad salvatore, no complaints of pain, states she needs to leave. Reviewed plan of care and patient wishes to leave AMA, line removed. Problem: Falls - Risk of  Goal: *Absence of Falls  Description: Document Lilliana Bentley Fall Risk and appropriate interventions in the flowsheet. Outcome: Resolved/Not Met  Note: Fall Risk Interventions:  Mobility Interventions: Patient to call before getting OOB    Mentation Interventions: Adequate sleep, hydration, pain control, Bed/chair exit alarm    Medication Interventions: Evaluate medications/consider consulting pharmacy, Patient to call before getting OOB, Teach patient to arise slowly    Elimination Interventions: Bed/chair exit alarm              Problem: Patient Education: Go to Patient Education Activity  Goal: Patient/Family Education  Outcome: Resolved/Not Met     Problem: Pressure Injury - Risk of  Goal: *Prevention of pressure injury  Description: Document Terrance Scale and appropriate interventions in the flowsheet. Outcome: Resolved/Not Met  Note: Pressure Injury Interventions:  Sensory Interventions: Keep linens dry and wrinkle-free, Minimize linen layers, Turn and reposition approx.  every two hours (pillows and wedges if needed)    Moisture Interventions: Absorbent underpads    Activity Interventions: Pressure redistribution bed/mattress(bed type)    Mobility Interventions: Pressure redistribution bed/mattress (bed type)    Nutrition Interventions: Offer support with meals,snacks and hydration    Friction and Shear Interventions: HOB 30 degrees or less, Lift sheet, Minimize layers                Problem: Patient Education: Go to Patient Education Activity  Goal: Patient/Family Education  Outcome: Resolved/Not Met     Problem: Pain  Goal: *Control of Pain  Outcome: Resolved/Not Met  Goal: *PALLIATIVE CARE:  Alleviation of Pain  Outcome: Resolved/Not Met     Problem: Patient Education: Go to Patient Education Activity  Goal: Patient/Family Education  Outcome: Resolved/Not Met     Problem: Hypertension  Goal: *Blood pressure within specified parameters  Outcome: Resolved/Not Met  Goal: *Fluid volume balance  Outcome: Resolved/Not Met  Goal: *Labs within defined limits  Outcome: Resolved/Not Met     Problem: Patient Education: Go to Patient Education Activity  Goal: Patient/Family Education  Outcome: Resolved/Not Met     Problem: Non-Violent Restraints  Goal: Removal from restraints as soon as assessed to be safe  Outcome: Resolved/Not Met  Goal: No harm/injury to patient while restraints in use  Outcome: Resolved/Not Met  Goal: Patient's dignity will be maintained  Outcome: Resolved/Not Met  Goal: Patient Interventions  Outcome: Resolved/Not Met     Problem: Risk for Spread of Infection  Goal: Prevent transmission of infectious organism to others  Description: Prevent the transmission of infectious organisms to other patients, staff members, and visitors.   Outcome: Resolved/Not Met     Problem: Patient Education:  Go to Education Activity  Goal: Patient/Family Education  Outcome: Resolved/Not Met     Problem: Nutrition Deficit  Goal: *Optimize nutritional status  Outcome: Resolved/Not Met

## 2021-03-17 NOTE — PROGRESS NOTES
Bedside shift change report given to [address] (oncoming nurse) by Neris De Los Santos (offgoing nurse). Report included the following information SBAR, Kardex, MAR and Recent Results.

## 2021-03-17 NOTE — PROGRESS NOTES
[address] [facility] Adult  Hospitalist Group                                                                                          Hospitalist Progress Note  [address] [facility] [address],   Answering service: [phone] OR [phone] from in house phone        Date of Service:  3/17/2021  NAME:  Ailyn Rodriguez  :  1990  MRN:  [id number]      Please note that this dictation was completed with [employer], the computer voice recognition software. Quite often unanticipated grammatical, syntax, homophones, and other interpretive errors are inadvertently transcribed by the computer software. Please disregard these errors. Please excuse any errors that have escaped final proofreading. Admission Summary:   Patient with altered mental status not willing and unable to provide information. Mother reports she is adult but lives at her house. Not sure when she used any type of IV drugs last time. Possibly yesterday. Reports she has been dealing with IV drug abuse for many years. Apparently after an . Mother reports she has been multiple times in, due to narcotic overdose, reversed by nasal Narcan. She also has history of amphetamine use, unknown last use. Last 4 days unable to ambulate. She has been suffering from severe myalgia and fever occasional cough, negative for Covid no exposure. Mother reports she was negative as well. Patient and mother report, never had similar symptoms in the past.  She takes Suboxone and Xanax as needed for anxiety. Not have any other prescriptions.       Interval history / Subjective: Follow up sepsis. Patient seen and examined. Reports pain all over during the night. Patient with supervised visitation for 1 hour yesterday evening. PCT noted drugs being exchanged. Per report, police were notified and drugs confiscated. During encounter, I discussed with patient that we found drugs and given to .  She adamantly denied that drugs were being exchanged multiple times. Patient was informed that her friends were not allowed to visit. Attempted to discuss ongoing addiction but patient continues to deny that she needs additional support. She was educated on dangers of leaving AMA. Assessment & Plan:     Severe Sepsis due to MSSA bacteremia POA in setting of IVDU  Mitral valve Infective endocarditis  Cerebral Septic embolic  -ID following  -Continues cefazolin x 6 week from previous cleared blood culture, tentative date 4/18/2021  --Recent blood culture from 3/7/2021 NGTD  -MRI brain with multiple punctate foci restricted diffusion in T2 hyperintensity, suggesting emboli  --holding prophylactic lovenox due to concerns for hemorrhagic conversion     Polysubstance with possible benzo/ heroin withdrawal and/or amphetamine overdose  Anxiety, persistent  -continue home suboxone, lidocaine patch as needed  -appreciate psychiatry- initiated on klonopin, remeron, atarax QID scheduled       Rhinovirus:   -detected on viral PCR, droplet precautions    Altered mental status, multifactorial: resolved  -Encephalopathy from sepsis and withdrawal from drugs, heroin withdrawal and benzo withdrawal, and embolic events  -avoid benzodiazepines due to overuse/previous over sedation and addictive effects       Anemia:   -Etiology unclear, monitor closely, patient defers blood transfusion at this time    Hypokalemia  -replete as needed    Thrombocytopenia now with thrombocytosis  -Monitor, likely reactive    Diarrhea:   -work up negative    Hypernatremia   -resolved    Body mass index is 18.64 kg/m². Patient unable to have visitors due to concerns for bringing in controlled substances. Parents at this time are allowed to visit.      Has CVL, will need PICC line eventually but patient appears too anxious to undergo bedside procedure    Code status: FULL  DVT prophylaxis:SCD/ambulatory    Care Plan discussed with: Patient/Family  Anticipated Disposition: Home w/Family  Anticipated Discharge: Greater than 48 hours Antibiotics until 4/18/2021     Hospital Problems  Never Reviewed          Codes Class Noted POA    Cerebrovascular accident (CVA) due to embolism of precerebral artery ([facility] [address] ICD-10-CM: I63.10  ICD-9-CM: 434.11  3/6/2021 Unknown        Sepsis ([facility] [address] ICD-10-CM: A41.9  ICD-9-CM: 038.9, 995.91  2/28/2021 Unknown              Review of Systems:     Negative unless stated above    Vital Signs:    Last 24hrs VS reviewed since prior progress note. Most recent are:  Visit Vitals  /66 (BP 1 Location: Right upper arm, BP Patient Position: At rest)   Pulse (!) 110   Temp 98.5 °F (36.9 °C)   Resp 16   Ht 5' 6\" (1.676 m)   Wt 52.4 kg (115 lb 8 oz)   SpO2 99%   BMI 18.64 kg/m²         Intake/Output Summary (Last 24 hours) at 3/17/2021 0827  Last data filed at 3/16/2021 0930  Gross per 24 hour   Intake 400 ml   Output    Net 400 ml        Physical Examination:     I had a face to face encounter with this patient and independently examined them on 03/17/21 as outlined below:        Constitutional:  AAOX3, awake, anxious, thin appearing    ENT:  Oral mucosa moist, oropharynx benign. Resp:  CTA bilaterally. No wheezing/rhonchi/rales. No accessory muscle use   CV:  tachycardic, 2/6 systolic murmur heard at the right upper sternal border    GI:  Soft, non distended, non tender. normoactive bowel sounds, no hepatosplenomegaly     Musculoskeletal:  Moving around in the bed    Neurologic:  Alert and oriented             Data Review:    Review and/or order of clinical lab test      Labs:     Recent Labs     03/15/21  0232   WBC 6.9   HGB 7.6*   HCT 24.0*   *     Recent Labs     03/15/21  0232   *   K 4.1      CO2 33*   BUN 11   CREA 0.56   *   CA 9.3     No results for input(s): ALT, AP, TBIL, TBILI, TP, ALB, GLOB, GGT, AML, LPSE in the last 72 hours. No lab exists for component: SGOT, GPT, AMYP, HLPSE  No results for input(s): INR, PTP, APTT, INREXT, INREXT in the last 72 hours. No results for input(s): FE, TIBC, PSAT, FERR in the last 72 hours. No results found for: FOL, RBCF   No results for input(s): PH, PCO2, PO2 in the last 72 hours. No results for input(s): CPK, CKNDX, TROIQ in the last 72 hours.     No lab exists for component: CPKMB  Lab Results   Component Value Date/Time    Cholesterol, total 74 03/07/2021 04:42 AM    HDL Cholesterol 21 03/07/2021 04:42 AM    LDL, calculated 38.6 03/07/2021 04:42 AM    Triglyceride 72 03/07/2021 04:42 AM    CHOL/HDL Ratio 3.5 03/07/2021 04:42 AM     Lab Results   Component Value Date/Time    Glucose (POC) 132 (H) 03/03/2021 05:27 AM    Glucose (POC) 118 (H) 03/03/2021 12:18 AM     Lab Results   Component Value Date/Time    Color DARK YELLOW 02/28/2021 06:12 PM    Appearance CLEAR 02/28/2021 06:12 PM    Specific gravity 1.020 02/28/2021 06:12 PM    Specific gravity 1.016 02/28/2021 06:00 AM    pH (UA) 6.5 02/28/2021 06:12 PM    Protein 100 (A) 02/28/2021 06:12 PM    Glucose Negative 02/28/2021 06:12 PM    Ketone 15 (A) 02/28/2021 06:12 PM    Bilirubin Negative 02/28/2021 06:00 AM    Urobilinogen 2.0 (H) 02/28/2021 06:12 PM    Nitrites Positive (A) 02/28/2021 06:12 PM    Leukocyte Esterase MODERATE (A) 02/28/2021 06:12 PM    Epithelial cells FEW 02/28/2021 06:12 PM    Bacteria 1+ (A) 02/28/2021 06:12 PM    WBC 20-50 02/28/2021 06:12 PM    RBC >100 (H) 02/28/2021 06:12 PM         Medications Reviewed:     Current Facility-Administered Medications   Medication Dose Route Frequency    clonazePAM (KlonoPIN) tablet 1 mg  1 mg Oral BID PRN    hydrOXYzine HCL (ATARAX) tablet 50 mg  50 mg Oral QID    mirtazapine (REMERON) tablet 15 mg  15 mg Oral QHS    acetaminophen (TYLENOL) tablet 650 mg  650 mg Oral Q4H PRN    Or    acetaminophen (TYLENOL) suppository 650 mg  650 mg Rectal Q4H PRN    ibuprofen (MOTRIN) tablet 600 mg  600 mg Oral Q6H PRN    multivitamin, tx-iron-ca-min (THERA-M w/ IRON) tablet 1 Tab  1 Tab Oral DAILY    ceFAZolin (ANCEF) 2 g in sterile water (preservative free) 20 mL IV syringe  2 g IntraVENous Q8H    melatonin tablet 3 mg  3 mg Oral QHS PRN    nicotine (NICODERM CQ) 21 mg/24 hr patch 1 Patch  1 Patch TransDERmal DAILY    influenza vaccine 2020-21 (6 mos+)(PF) (FLUARIX/FLULAVAL/FLUZONE QUAD) injection 0.5 mL  0.5 mL IntraMUSCular PRIOR TO DISCHARGE    buprenorphine-naloxone (SUBOXONE) 8-2mg SL tablet  1 Tab SubLINGual BID    sodium chloride (NS) flush 5-40 mL  5-40 mL IntraVENous Q8H    sodium chloride (NS) flush 5-40 mL  5-40 mL IntraVENous PRN    polyethylene glycol (MIRALAX) packet 17 g  17 g Oral DAILY PRN    promethazine (PHENERGAN) tablet 12.5 mg  12.5 mg Oral Q6H PRN    Or    ondansetron (ZOFRAN) injection 4 mg  4 mg IntraVENous Q6H PRN     ______________________________________________________________________  EXPECTED LENGTH OF STAY: 4d 19h  ACTUAL LENGTH OF STAY:          [address] Reji Mason DO

## 2021-03-17 NOTE — DISCHARGE SUMMARY
Discharge Summary       PATIENT ID: Charity Romberg  MRN: [id number]   YOB: 1990    DATE OF ADMISSION: 2/28/2021  8:03 PM    DATE OF DISCHARGE: 3/17/2021   PRIMARY CARE PROVIDER: Unknown, Provider     ATTENDING PHYSICIAN: Deangelo Martinez DO   DISCHARGING PROVIDER: [address], DO    To contact this individual call [phone] and ask the  to page. If unavailable ask to be transferred the Adult Hospitalist Department. CONSULTATIONS: IP CONSULT TO INFECTIOUS DISEASES  IP CONSULT TO NEUROLOGY  IP CONSULT TO PSYCHIATRY  IP CONSULT TO PSYCHIATRY  IP CONSULT TO PSYCHIATRY  IP CONSULT TO CARDIAC SURGERY    PROCEDURES/SURGERIES: Procedure(s): INFUSION CATHETER INSERTION    ADMITTING [facility] COURSE:     31year old female with past medical history substance abuse presenting to [facility] on 2/28/2021 for altered mental status, myalgia, and fever. Patient admitted for further evaluation. Found to have MSSA bacteremia, cerebral septic emboli, and mitral valve endocarditis. She had several positive blood cultures and last negative were 3/5/2021. Patient's care plan was to remain in the hospital until 4/18/2021 to complete inpatient antibiotics. Patient's hospitalization complicated by alerted mental status, agitation, and anxiety. Symptoms thought to be multifactorial due to possible withdrawal. She was also given ativan for agitation and had subsequent lethargy. Psychiatry evaluated and patient placed on scheduled regimen with improvement of symptoms. Patient had restricted visitors due to finding controlled substances in a cigarette box. Later in her hospital stay, she asked for a supervised visit and was allowed a 1 hour visit with her boyfriend. The encounter was supervised by a patient care technician. It was noted that the boyfriend gave the patient a bag that contained drugs. When confronted regarding encounter, patient denied event and became defensive.  She decided to leave against medical advice. She was counseled extensively on risks of leaving AMA including death. She was informed that current diagnoses were only treatable with IV antibiotics. Patient elected to leave. MRI brain:  IMPRESSION  1. Multiple punctate foci restricted diffusion and some associated T2  hyperintensity gray-white junction greater in the right hemisphere suggesting  emboli. 2. Other multifocal white matter T2 hyperintensity may be more chronic and of  uncertain etiology. CT abd/pelvis with contrast:  IMPRESSION  1. Limited by motion. 2.  Left lower lobe airspace disease and left effusion suspicious for pneumonia. 3.   Distended urinary bladder. 4.  Free fluid in the pelvis. No evidence of bowel obstruction or free  intraperitoneal air    CTA chest with contrast:  IMPRESSION  No pulmonary process or septic emboli. Lower lobe branch bronchial  opacification likely reflecting  secretions or aspiration. Echo:    · LV: Calculated LVEF is 71%. Normal cavity size, wall thickness, systolic function (ejection fraction normal) and diastolic function. Wall motion: normal. Normal left ventricular strain. · Image quality for this study was suboptimal.  · MV: Mitral valve non-specific thickening. Mild mitral valve prolapse of the posterior leaflet. Severe mitral valve regurgitation is present. Possible vegetation present on the posterior leaflet of the mitral valve. Posterior mitral leaflet vegetation is moderately sized. Vegetation is localized on the atrial aspect of the posterior mitral leaflet.     DISCHARGE DIAGNOSES / PLAN:      Severe Sepsis due to MSSA bacteremia POA in setting of IVDU  Mitral valve Infective endocarditis  Cerebral Septic embolic  -ID following  -Continues cefazolin x 6 week from previous cleared blood culture, tentative date 4/18/2021  --Recent blood culture from 3/7/2021 [facility]  -MRI brain with multiple punctate foci restricted diffusion in T2 hyperintensity, suggesting emboli  --holding prophylactic lovenox due to concerns for hemorrhagic conversion      Polysubstance with possible benzo/ heroin withdrawal and/or amphetamine overdose  Anxiety, persistent  -continue home suboxone, lidocaine patch as needed  -appreciate psychiatry- initiated on klonopin BID, remeron qhs, atarax QID scheduled       Rhinovirus:   -detected on viral PCR, droplet precautions     Altered mental status, multifactorial: resolved  -Encephalopathy from sepsis and withdrawal from drugs, heroin withdrawal and benzo withdrawal, and embolic events  -avoid benzodiazepines due to overuse/previous over sedation and addictive effects        Anemia:   -Etiology unclear, monitor closely, patient defers blood transfusion at this time     Hypokalemia  -replete as needed     Thrombocytopenia now with thrombocytosis  -Monitor, likely reactive     Diarrhea:   -work up negative     Hypernatremia   -resolved    Body mass index is 18.64 kg/m².          PENDING TEST RESULTS:   At the time of discharge the following test results are still pending: none    DISPOSITION:  x  Home With:   OT  PT  HH  RN       Long term SNF/Inpatient Rehab    Independent/assisted living    Hospice    Other:       PATIENT CONDITION AT DISCHARGE:     Functional status    Poor     Deconditioned    x Independent      Cognition   x  Lucid     Forgetful     Dementia      Catheters/lines (plus indication)    Quintana     PICC     PEG    x None      Code status    xx Full code     DNR      PHYSICAL EXAMINATION AT DISCHARGE: From earlier in the day   Constitutional:  AAOX3, awake, anxious, thin appearing    ENT:  Oral mucosa moist, oropharynx benign. Resp:  CTA bilaterally. No wheezing/rhonchi/rales. No accessory muscle use   CV:  tachycardic, 2/6 systolic murmur heard at the right upper sternal border    GI:  Soft, non distended, non tender.  normoactive bowel sounds, no hepatosplenomegaly     Musculoskeletal:  Moving around in the bed Neurologic:  Alert and oriented                                         CHRONIC MEDICAL DIAGNOSES:  Problem List as of 3/17/2021 Never Reviewed          Codes Class Noted - Resolved    Cerebrovascular accident (CVA) due to embolism of precerebral artery ([facility] ICD-10-CM: I63.10  ICD-9-CM: 434.11  3/6/2021 - Present        Acute encephalopathy ICD-10-CM: G93.40  ICD-9-CM: 348.30  2/28/2021 - Present        Sepsis ([facility] ICD-10-CM: A41.9  ICD-9-CM: 038.9, 995.91  2/28/2021 - Present        Acute psychosis ([facility] ICD-10-CM: F23  ICD-9-CM: 298.9  10/10/2020 - Present        Schizoaffective disorder ([facility] ICD-10-CM: F25.9  ICD-9-CM: 295.70  10/10/2020 - Present              Greater than 30 minutes were spent with the patient on counseling and coordination of care    Signed:   [address] [facility], DO  3/17/2021  2:19 PM

## 2021-03-17 NOTE — PROGRESS NOTES
Patient adamant that she is leaving AMA today, has asked that central line be removed and that she would leave after one hour with transport by her boyfriend. Explained reasons why she should stay and her risk of dying if she were to leave. Patient states she accepts responsibility and understands the risks. Patient does not believe that she is at risk for death. Patient states she will find an infusion center and seek treatment at a later time. Notified MD.     [phone] patient belongings packed and patient left AMA. MD notified.

## 2021-06-28 ENCOUNTER — TRANSCRIBE ORDER (OUTPATIENT)
Dept: SCHEDULING | Age: 31
End: 2021-06-28

## 2021-06-28 DIAGNOSIS — I39 ENDOCARDITIS AND HEART VALVE DISORDERS IN DISEASES CLASSIFIED ELSEWHERE: Primary | ICD-10-CM

## 2021-08-09 ENCOUNTER — HOSPITAL ENCOUNTER (OUTPATIENT)
Dept: NON INVASIVE DIAGNOSTICS | Age: 31
Discharge: HOME | End: 2021-08-09
Attending: INTERNAL MEDICINE
Payer: MEDICAID

## 2021-08-09 VITALS
SYSTOLIC BLOOD PRESSURE: 130 MMHG | WEIGHT: 107 LBS | BODY MASS INDEX: 17.19 KG/M2 | DIASTOLIC BLOOD PRESSURE: 90 MMHG | HEIGHT: 66 IN

## 2021-08-09 DIAGNOSIS — I39 ENDOCARDITIS AND HEART VALVE DISORDERS IN DISEASES CLASSIFIED ELSEWHERE: ICD-10-CM

## 2021-08-09 LAB
ECHO AO ROOT DIAM: 2.9 CM
ECHO AV PEAK GRADIENT: 8 MMHG
ECHO AV PEAK VELOCITY: 137 CM/S
ECHO EST RA PRESSURE: 3 MMHG
ECHO LA AREA 4C: 17.71 CM2
ECHO LA MAJOR AXIS: 3.6 CM
ECHO LA MINOR AXIS: 2.35 CM
ECHO LV E' SEPTAL VELOCITY: 3.7 CM/S
ECHO LV EDV A2C: 54.9 CM3
ECHO LV EJECTION FRACTION BIPLANE: 70.2 % (ref 55–100)
ECHO LV ESV A2C: 12.5 CM3
ECHO LV INTERNAL DIMENSION DIASTOLIC: 3.8 CM (ref 3.9–5.3)
ECHO LV INTERNAL DIMENSION SYSTOLIC: 2.32 CM
ECHO LV IVSD: 0.94 CM (ref 0.6–0.9)
ECHO LV MASS 2D: 114.8 G (ref 67–162)
ECHO LV MASS INDEX 2D: 75 G/M2 (ref 43–95)
ECHO LV POSTERIOR WALL DIASTOLIC: 1.03 CM (ref 0.6–0.9)
ECHO LVOT PEAK GRADIENT: 5 MMHG
ECHO LVOT PEAK VELOCITY: 111 CM/S
ECHO MV AREA PHT: 4.68 CM2
ECHO MV PRESSURE HALF TIME (PHT): 47 MS
ECHO PV MAX VELOCITY: 106 CM/S
ECHO PV PEAK INSTANTANEOUS GRADIENT SYSTOLIC: 4 MMHG
ECHO RA AREA 4C: 11.33 CM2
ECHO RIGHT VENTRICULAR SYSTOLIC PRESSURE (RVSP): 30 MMHG
ECHO RV INTERNAL DIMENSION: 2.48 CM
ECHO TV MAX VELOCITY: 262 CM/S
ECHO TV REGURGITANT PEAK GRADIENT: 27 MMHG
MV DEC SLOPE: 9520 MM/S2
MV DEC SLOPE: 9520 MM/S2

## 2021-08-09 PROCEDURE — 93306 TTE W/DOPPLER COMPLETE: CPT

## 2021-10-21 ENCOUNTER — TRANSCRIBE ORDER (OUTPATIENT)
Dept: SCHEDULING | Age: 31
End: 2021-10-21

## 2021-10-26 ENCOUNTER — TRANSCRIBE ORDER (OUTPATIENT)
Dept: SCHEDULING | Age: 31
End: 2021-10-26

## 2021-10-26 DIAGNOSIS — I34.0 MITRAL VALVE REGURGITATION: Primary | ICD-10-CM

## 2021-10-28 VITALS — BODY MASS INDEX: 17.68 KG/M2 | HEIGHT: 66 IN | WEIGHT: 110 LBS

## 2021-10-28 RX ORDER — CLONIDINE HYDROCHLORIDE 0.1 MG/1
0.1 TABLET ORAL 3 TIMES DAILY
COMMUNITY

## 2021-10-28 RX ORDER — ALPRAZOLAM 1 MG/1
1 TABLET ORAL 2 TIMES DAILY
COMMUNITY

## 2021-10-29 PROCEDURE — U0005 INFEC AGEN DETEC AMPLI PROBE: HCPCS

## 2021-10-31 LAB — SARS-COV-2, COV2: NORMAL

## 2021-11-01 LAB
SARS-COV-2, XPLCVT: NOT DETECTED
SOURCE, COVRS: NORMAL

## 2021-11-02 ENCOUNTER — HOSPITAL ENCOUNTER (OUTPATIENT)
Dept: ULTRASOUND IMAGING | Age: 31
Discharge: HOME | End: 2021-11-02
Attending: INTERNAL MEDICINE
Payer: MEDICAID

## 2021-11-02 DIAGNOSIS — B18.2 CHRONIC HEPATITIS C WITH HEPATIC COMA (HCC): ICD-10-CM

## 2021-11-02 PROCEDURE — 76705 ECHO EXAM OF ABDOMEN: CPT

## 2021-11-02 RX ORDER — SODIUM CHLORIDE 9 MG/ML
30 INJECTION, SOLUTION INTRAVENOUS CONTINUOUS
Status: CANCELLED | OUTPATIENT
Start: 2021-11-02

## 2021-11-04 ENCOUNTER — ANESTHESIA EVENT (OUTPATIENT)
Dept: NON INVASIVE DIAGNOSTICS | Age: 31
End: 2021-11-04

## 2021-11-04 ENCOUNTER — HOSPITAL ENCOUNTER (OUTPATIENT)
Dept: NON INVASIVE DIAGNOSTICS | Age: 31
Discharge: HOME | End: 2021-11-04
Attending: INTERNAL MEDICINE
Payer: MEDICAID

## 2021-11-04 ENCOUNTER — ANESTHESIA (OUTPATIENT)
Dept: NON INVASIVE DIAGNOSTICS | Age: 31
End: 2021-11-04

## 2021-11-04 DIAGNOSIS — I34.0 MITRAL VALVE INSUFFICIENCY, UNSPECIFIED ETIOLOGY: ICD-10-CM

## 2021-11-04 NOTE — PROGRESS NOTES
Patient had checked in for procedure. Nurse went to bring patient in from waiting room, patient was not in waiting room. Called patient's cell phone, no answer. Waited 5 mins to check in waiting room and patient still not present. Called patient's cell phone again and still no answer. Notified [facility] and they stated they would notify Dr. Philip Hays. [place]

## 2021-11-12 NOTE — BH NOTES
This is a 31yo SWF admitted to the services of Dr Barbie Mccord with a dx of acute Psychosis. Pt was ECO'd after she was seen going thru the trash and screaming and yelling as if he was talking to someone, though nobody was there with her. Pt was presenting with A/V hallucinations. Pt has a hx of inpatient psych admissions. Pt presented to the unit escorted by ED staff and [place] law enforcement. Pt disheveled and unkempt. Pt was cooperative with most of admission and some questions she declined to answer. Pt stated she was not sure of meds that she was taking at home. Pt denied hallucinations and delusions when asked. Pt also denied SI/HI. Pt requested to leave the office to use the bathroom. Pt declined tour of unit. Will continue to monitor pt on close observation and follow treatment plan. Quantity Per Injection Site (Units): 4

## 2021-11-18 ENCOUNTER — TRANSCRIBE ORDER (OUTPATIENT)
Dept: SCHEDULING | Age: 31
End: 2021-11-18

## 2021-11-19 ENCOUNTER — HOSPITAL ENCOUNTER (OUTPATIENT)
Dept: NON INVASIVE DIAGNOSTICS | Age: 31
Discharge: HOME | End: 2021-11-19
Attending: INTERNAL MEDICINE
Payer: MEDICAID

## 2021-11-19 DIAGNOSIS — I34.0 MITRAL VALVE REGURGITATION: ICD-10-CM

## 2021-11-19 LAB
ECHO AO ROOT DIAM: 3 CM
ECHO AV PEAK GRADIENT: 11 MMHG
ECHO AV PEAK VELOCITY: 169 CM/S
ECHO EST RA PRESSURE: 3 MMHG
ECHO LA AREA 4C: 11.67 CM2
ECHO LA MAJOR AXIS: 3.4 CM
ECHO LV E' SEPTAL VELOCITY: 5.26 CM/S
ECHO LV EDV A2C: 92.3 CM3
ECHO LV EJECTION FRACTION BIPLANE: 71.1 % (ref 55–100)
ECHO LV ESV A2C: 19.7 CM3
ECHO LV INTERNAL DIMENSION DIASTOLIC: 4.52 CM (ref 3.9–5.3)
ECHO LV INTERNAL DIMENSION SYSTOLIC: 2.7 CM
ECHO LV IVSD: 0.74 CM (ref 0.6–0.9)
ECHO LV MASS 2D: 102.6 G (ref 67–162)
ECHO LV POSTERIOR WALL DIASTOLIC: 0.73 CM (ref 0.6–0.9)
ECHO LVOT PEAK GRADIENT: 3 MMHG
ECHO LVOT PEAK VELOCITY: 85.8 CM/S
ECHO MV A VELOCITY: 91.3 CM/S
ECHO MV AREA PHT: 5.37 CM2
ECHO MV E DECELERATION TIME (DT): 137 MS
ECHO MV E VELOCITY: 75.3 CM/S
ECHO MV E/A RATIO: 0.82
ECHO MV E/E' SEPTAL: 14.32
ECHO MV PRESSURE HALF TIME (PHT): 41 MS
ECHO PV MAX VELOCITY: 103 CM/S
ECHO PV PEAK INSTANTANEOUS GRADIENT SYSTOLIC: 4 MMHG
ECHO RA AREA 4C: 7.05 CM2
ECHO RIGHT VENTRICULAR SYSTOLIC PRESSURE (RVSP): 23 MMHG
ECHO RV INTERNAL DIMENSION: 2.02 CM
ECHO RV TAPSE: 1.6 CM (ref 1.5–2)
ECHO TV MAX VELOCITY: 226 CM/S
ECHO TV REGURGITANT PEAK GRADIENT: 20 MMHG
MV DEC SLOPE: 5970 MM/S2
MV DEC SLOPE: 5970 MM/S2

## 2021-11-19 PROCEDURE — 93306 TTE W/DOPPLER COMPLETE: CPT

## 2022-05-25 ENCOUNTER — APPOINTMENT (OUTPATIENT)
Dept: GENERAL RADIOLOGY | Age: 32
End: 2022-05-25
Attending: EMERGENCY MEDICINE
Payer: MEDICAID

## 2022-05-25 ENCOUNTER — APPOINTMENT (OUTPATIENT)
Dept: NON INVASIVE DIAGNOSTICS | Age: 32
End: 2022-05-25
Attending: EMERGENCY MEDICINE
Payer: MEDICAID

## 2022-05-25 ENCOUNTER — HOSPITAL ENCOUNTER (EMERGENCY)
Age: 32
Discharge: ANOTHER HOSPITAL | End: 2022-05-25
Attending: EMERGENCY MEDICINE
Payer: MEDICAID

## 2022-05-25 ENCOUNTER — APPOINTMENT (OUTPATIENT)
Dept: CT IMAGING | Age: 32
End: 2022-05-25
Attending: EMERGENCY MEDICINE
Payer: MEDICAID

## 2022-05-25 VITALS
BODY MASS INDEX: 15.7 KG/M2 | DIASTOLIC BLOOD PRESSURE: 90 MMHG | OXYGEN SATURATION: 100 % | HEART RATE: 140 BPM | HEIGHT: 67 IN | WEIGHT: 100 LBS | RESPIRATION RATE: 30 BRPM | SYSTOLIC BLOOD PRESSURE: 146 MMHG | TEMPERATURE: 102.7 F

## 2022-05-25 DIAGNOSIS — E87.6 HYPOKALEMIA: ICD-10-CM

## 2022-05-25 DIAGNOSIS — E87.3 RESPIRATORY ALKALOSIS: ICD-10-CM

## 2022-05-25 DIAGNOSIS — I63.10 CEREBROVASCULAR ACCIDENT (CVA) DUE TO EMBOLISM OF PRECEREBRAL ARTERY (HCC): ICD-10-CM

## 2022-05-25 DIAGNOSIS — A41.9 SEPSIS, DUE TO UNSPECIFIED ORGANISM, UNSPECIFIED WHETHER ACUTE ORGAN DYSFUNCTION PRESENT (HCC): ICD-10-CM

## 2022-05-25 DIAGNOSIS — D69.6 THROMBOCYTOPENIA (HCC): ICD-10-CM

## 2022-05-25 DIAGNOSIS — I33.0 INFECTIVE ENDOCARDITIS, DUE TO UNSPECIFIED ORGANISM, UNSPECIFIED CHRONICITY: Primary | ICD-10-CM

## 2022-05-25 DIAGNOSIS — I70.90 ARTERIAL OCCLUSION: ICD-10-CM

## 2022-05-25 DIAGNOSIS — R06.03 RESPIRATORY DISTRESS: ICD-10-CM

## 2022-05-25 LAB
ALBUMIN SERPL-MCNC: 2.6 G/DL (ref 3.5–5)
ALBUMIN/GLOB SERPL: 0.6 {RATIO} (ref 1.1–2.2)
ALP SERPL-CCNC: 328 U/L (ref 45–117)
ALT SERPL-CCNC: 96 U/L (ref 12–78)
AMPHET UR QL SCN: POSITIVE
ANION GAP SERPL CALC-SCNC: 22 MMOL/L (ref 5–15)
APAP SERPL-MCNC: <10 UG/ML (ref 10–30)
APPEARANCE UR: ABNORMAL
APTT PPP: 33.1 SEC (ref 21.2–34.1)
ARTERIAL PATENCY WRIST A: ABNORMAL
AST SERPL W P-5'-P-CCNC: 139 U/L (ref 15–37)
ATRIAL RATE: 137 BPM
BACTERIA URNS QL MICRO: NEGATIVE /HPF
BACTERIA URNS QL MICRO: NEGATIVE /HPF
BARBITURATES UR QL SCN: NEGATIVE
BASE DEFICIT BLDA-SCNC: 4.5 MMOL/L (ref 0–2)
BASOPHILS # BLD: 0 K/UL (ref 0–0.1)
BASOPHILS NFR BLD: 0 % (ref 0–1)
BDY SITE: ABNORMAL
BENZODIAZ UR QL: NEGATIVE
BILIRUB SERPL-MCNC: 1.6 MG/DL (ref 0.2–1)
BILIRUB UR QL: NEGATIVE
BUN SERPL-MCNC: 31 MG/DL (ref 6–20)
BUN/CREAT SERPL: 20 (ref 12–20)
CA-I BLD-MCNC: 10.1 MG/DL (ref 8.5–10.1)
CALCULATED P AXIS, ECG09: 81 DEGREES
CALCULATED R AXIS, ECG10: 129 DEGREES
CALCULATED T AXIS, ECG11: 22 DEGREES
CANNABINOIDS UR QL SCN: NEGATIVE
CHLORIDE SERPL-SCNC: 98 MMOL/L (ref 97–108)
CK SERPL-CCNC: 463 U/L (ref 26–192)
CO2 SERPL-SCNC: 15 MMOL/L (ref 21–32)
COCAINE UR QL SCN: NEGATIVE
COLOR UR: ABNORMAL
COVID-19 RAPID TEST, COVR: NOT DETECTED
CREAT SERPL-MCNC: 1.57 MG/DL (ref 0.55–1.02)
D DIMER PPP FEU-MCNC: 10.42 UG/ML(FEU)
DATE LAST DOSE: ABNORMAL
DATE LAST DOSE: ABNORMAL
DIAGNOSIS, 93000: NORMAL
DIFFERENTIAL METHOD BLD: ABNORMAL
DRUG SCRN COMMENT,DRGCM: ABNORMAL
EOSINOPHIL # BLD: 0 K/UL (ref 0–0.4)
EOSINOPHIL NFR BLD: 0 % (ref 0–7)
ERYTHROCYTE [DISTWIDTH] IN BLOOD BY AUTOMATED COUNT: 12.5 % (ref 11.5–14.5)
ETHANOL SERPL-MCNC: <10 MG/DL
FIBRINOGEN PPP-MCNC: 304 MG/DL (ref 220–535)
FIO2 ON VENT: 21 %
GLOBULIN SER CALC-MCNC: 4.4 G/DL (ref 2–4)
GLUCOSE SERPL-MCNC: 255 MG/DL (ref 65–100)
GLUCOSE UR STRIP.AUTO-MCNC: 50 MG/DL
HCG SERPL QL: NEGATIVE
HCO3 BLDA-SCNC: 21 MMOL/L (ref 22–26)
HCT VFR BLD AUTO: 41.7 % (ref 35–47)
HGB BLD-MCNC: 14.7 G/DL (ref 11.5–16)
HGB UR QL STRIP: ABNORMAL
IMM GRANULOCYTES # BLD AUTO: 0 K/UL
IMM GRANULOCYTES NFR BLD AUTO: 0 %
INR PPP: 1.3 (ref 0.9–1.1)
INR PPP: 1.7 (ref 0.9–1.1)
KETONES UR QL STRIP.AUTO: NEGATIVE MG/DL
LACTATE SERPL-SCNC: 13.8 MMOL/L (ref 0.4–2)
LEUKOCYTE ESTERASE UR QL STRIP.AUTO: ABNORMAL
LYMPHOCYTES # BLD: 0.4 K/UL (ref 0.8–3.5)
LYMPHOCYTES NFR BLD: 2 % (ref 12–49)
MCH RBC QN AUTO: 29.6 PG (ref 26–34)
MCHC RBC AUTO-ENTMCNC: 35.3 G/DL (ref 30–36.5)
MCV RBC AUTO: 84.1 FL (ref 80–99)
METAMYELOCYTES NFR BLD MANUAL: 7 %
METHADONE UR QL: NEGATIVE
MONOCYTES # BLD: 0.2 K/UL (ref 0–1)
MONOCYTES NFR BLD: 1 % (ref 5–13)
MYELOCYTES NFR BLD MANUAL: 1 %
NEUTS BAND NFR BLD MANUAL: 14 % (ref 0–6)
NEUTS SEG # BLD: 15.7 K/UL (ref 1.8–8)
NEUTS SEG NFR BLD: 75 % (ref 32–75)
NITRITE UR QL STRIP.AUTO: NEGATIVE
NRBC # BLD: 0 K/UL (ref 0–0.01)
NRBC BLD-RTO: 0 PER 100 WBC
OPIATES UR QL: NEGATIVE
P-R INTERVAL, ECG05: 146 MS
PCO2 BLDA: 21 MMHG (ref 35–45)
PCP UR QL: NEGATIVE
PH BLDA: 7.5 [PH] (ref 7.35–7.45)
PH UR STRIP: 7 [PH] (ref 5–8)
PHOSPHATE SERPL-MCNC: 1.3 MG/DL (ref 2.6–4.7)
PLATELET # BLD AUTO: 20 K/UL (ref 150–400)
PO2 BLDA: 126 MMHG (ref 75–100)
POTASSIUM SERPL-SCNC: 2.3 MMOL/L (ref 3.5–5.1)
PROT SERPL-MCNC: 7 G/DL (ref 6.4–8.2)
PROT UR STRIP-MCNC: 100 MG/DL
PROTHROMBIN TIME: 15.7 SEC (ref 11.9–14.6)
PROTHROMBIN TIME: 19.3 SEC (ref 11.9–14.6)
Q-T INTERVAL, ECG07: 286 MS
QRS DURATION, ECG06: 104 MS
QTC CALCULATION (BEZET), ECG08: 431 MS
RBC # BLD AUTO: 4.96 M/UL (ref 3.8–5.2)
RBC #/AREA URNS HPF: >100 /HPF (ref 0–5)
RBC #/AREA URNS HPF: >100 /HPF (ref 0–5)
RBC MORPH BLD: ABNORMAL
REPORTED DOSE,DOSE: ABNORMAL UNITS
REPORTED DOSE,DOSE: ABNORMAL UNITS
SALICYLATES SERPL-MCNC: <1.7 MG/DL (ref 2.8–20)
SAO2 % BLD: 99 %
SAO2% DEVICE SAO2% SENSOR NAME: ABNORMAL
SODIUM SERPL-SCNC: 135 MMOL/L (ref 136–145)
SP GR UR REFRACTOMETRY: >1.03 (ref 1–1.03)
THERAPEUTIC RANGE,PTTT: NORMAL SEC (ref 82–109)
UROBILINOGEN UR QL STRIP.AUTO: 0.1 EU/DL (ref 0.1–1)
VENTRICULAR RATE, ECG03: 137 BPM
WBC # BLD AUTO: 17.6 K/UL (ref 3.6–11)
WBC MORPH BLD: ABNORMAL
WBC URNS QL MICRO: >100 /HPF (ref 0–4)
WBC URNS QL MICRO: >100 /HPF (ref 0–4)

## 2022-05-25 PROCEDURE — 74011000258 HC RX REV CODE- 258: Performed by: STUDENT IN AN ORGANIZED HEALTH CARE EDUCATION/TRAINING PROGRAM

## 2022-05-25 PROCEDURE — 74011250636 HC RX REV CODE- 250/636

## 2022-05-25 PROCEDURE — 80179 DRUG ASSAY SALICYLATE: CPT

## 2022-05-25 PROCEDURE — 87635 SARS-COV-2 COVID-19 AMP PRB: CPT

## 2022-05-25 PROCEDURE — 84100 ASSAY OF PHOSPHORUS: CPT

## 2022-05-25 PROCEDURE — 70496 CT ANGIOGRAPHY HEAD: CPT

## 2022-05-25 PROCEDURE — 80307 DRUG TEST PRSMV CHEM ANLYZR: CPT

## 2022-05-25 PROCEDURE — 85379 FIBRIN DEGRADATION QUANT: CPT

## 2022-05-25 PROCEDURE — 85610 PROTHROMBIN TIME: CPT

## 2022-05-25 PROCEDURE — 94762 N-INVAS EAR/PLS OXIMTRY CONT: CPT

## 2022-05-25 PROCEDURE — 71045 X-RAY EXAM CHEST 1 VIEW: CPT

## 2022-05-25 PROCEDURE — 82077 ASSAY SPEC XCP UR&BREATH IA: CPT

## 2022-05-25 PROCEDURE — 93926 LOWER EXTREMITY STUDY: CPT

## 2022-05-25 PROCEDURE — 99285 EMERGENCY DEPT VISIT HI MDM: CPT

## 2022-05-25 PROCEDURE — 83605 ASSAY OF LACTIC ACID: CPT

## 2022-05-25 PROCEDURE — 94002 VENT MGMT INPAT INIT DAY: CPT

## 2022-05-25 PROCEDURE — 36415 COLL VENOUS BLD VENIPUNCTURE: CPT

## 2022-05-25 PROCEDURE — 84703 CHORIONIC GONADOTROPIN ASSAY: CPT

## 2022-05-25 PROCEDURE — 74011000636 HC RX REV CODE- 636: Performed by: EMERGENCY MEDICINE

## 2022-05-25 PROCEDURE — 85730 THROMBOPLASTIN TIME PARTIAL: CPT

## 2022-05-25 PROCEDURE — 82803 BLOOD GASES ANY COMBINATION: CPT

## 2022-05-25 PROCEDURE — 87040 BLOOD CULTURE FOR BACTERIA: CPT

## 2022-05-25 PROCEDURE — 74011250637 HC RX REV CODE- 250/637: Performed by: EMERGENCY MEDICINE

## 2022-05-25 PROCEDURE — 74011250636 HC RX REV CODE- 250/636: Performed by: STUDENT IN AN ORGANIZED HEALTH CARE EDUCATION/TRAINING PROGRAM

## 2022-05-25 PROCEDURE — 85384 FIBRINOGEN ACTIVITY: CPT

## 2022-05-25 PROCEDURE — 81001 URINALYSIS AUTO W/SCOPE: CPT

## 2022-05-25 PROCEDURE — 74011250636 HC RX REV CODE- 250/636: Performed by: EMERGENCY MEDICINE

## 2022-05-25 PROCEDURE — 70450 CT HEAD/BRAIN W/O DYE: CPT

## 2022-05-25 PROCEDURE — 82550 ASSAY OF CK (CPK): CPT

## 2022-05-25 PROCEDURE — 96374 THER/PROPH/DIAG INJ IV PUSH: CPT

## 2022-05-25 PROCEDURE — 87077 CULTURE AEROBIC IDENTIFY: CPT

## 2022-05-25 PROCEDURE — 73562 X-RAY EXAM OF KNEE 3: CPT

## 2022-05-25 PROCEDURE — 31500 INSERT EMERGENCY AIRWAY: CPT

## 2022-05-25 PROCEDURE — 36600 WITHDRAWAL OF ARTERIAL BLOOD: CPT

## 2022-05-25 PROCEDURE — 85025 COMPLETE CBC W/AUTO DIFF WBC: CPT

## 2022-05-25 PROCEDURE — 96375 TX/PRO/DX INJ NEW DRUG ADDON: CPT

## 2022-05-25 PROCEDURE — 74011000250 HC RX REV CODE- 250: Performed by: EMERGENCY MEDICINE

## 2022-05-25 PROCEDURE — 83036 HEMOGLOBIN GLYCOSYLATED A1C: CPT

## 2022-05-25 PROCEDURE — 93005 ELECTROCARDIOGRAM TRACING: CPT

## 2022-05-25 PROCEDURE — 80143 DRUG ASSAY ACETAMINOPHEN: CPT

## 2022-05-25 PROCEDURE — 71275 CT ANGIOGRAPHY CHEST: CPT

## 2022-05-25 PROCEDURE — 87186 SC STD MICRODIL/AGAR DIL: CPT

## 2022-05-25 PROCEDURE — 80053 COMPREHEN METABOLIC PANEL: CPT

## 2022-05-25 RX ORDER — SUCCINYLCHOLINE CHLORIDE 20 MG/ML
INJECTION INTRAMUSCULAR; INTRAVENOUS
Status: DISCONTINUED
Start: 2022-05-25 | End: 2022-05-25 | Stop reason: HOSPADM

## 2022-05-25 RX ORDER — POTASSIUM CHLORIDE 7.45 MG/ML
10 INJECTION INTRAVENOUS ONCE
Status: DISCONTINUED | OUTPATIENT
Start: 2022-05-25 | End: 2022-05-25 | Stop reason: HOSPADM

## 2022-05-25 RX ORDER — MAGNESIUM SULFATE 100 %
4 CRYSTALS MISCELLANEOUS AS NEEDED
Status: DISCONTINUED | OUTPATIENT
Start: 2022-05-25 | End: 2022-05-25

## 2022-05-25 RX ORDER — SUCCINYLCHOLINE CHLORIDE 20 MG/ML INJECTION SOLUTION
120 SOLUTION
Status: COMPLETED | OUTPATIENT
Start: 2022-05-25 | End: 2022-05-25

## 2022-05-25 RX ORDER — DEXTROSE, SODIUM CHLORIDE, AND POTASSIUM CHLORIDE 5; .45; .3 G/100ML; G/100ML; G/100ML
INJECTION INTRAVENOUS CONTINUOUS
Status: DISCONTINUED | OUTPATIENT
Start: 2022-05-25 | End: 2022-05-25 | Stop reason: HOSPADM

## 2022-05-25 RX ORDER — ACETAMINOPHEN 650 MG/1
975 SUPPOSITORY RECTAL
Status: COMPLETED | OUTPATIENT
Start: 2022-05-25 | End: 2022-05-25

## 2022-05-25 RX ORDER — POTASSIUM CHLORIDE 7.45 MG/ML
10 INJECTION INTRAVENOUS ONCE
Status: COMPLETED | OUTPATIENT
Start: 2022-05-25 | End: 2022-05-25

## 2022-05-25 RX ORDER — ETOMIDATE 2 MG/ML
INJECTION INTRAVENOUS
Status: DISCONTINUED
Start: 2022-05-25 | End: 2022-05-25 | Stop reason: HOSPADM

## 2022-05-25 RX ORDER — LORAZEPAM 2 MG/ML
2 INJECTION INTRAMUSCULAR ONCE
Status: COMPLETED | OUTPATIENT
Start: 2022-05-25 | End: 2022-05-25

## 2022-05-25 RX ORDER — DEXTROSE MONOHYDRATE 100 MG/ML
250 INJECTION, SOLUTION INTRAVENOUS ONCE
Status: COMPLETED | OUTPATIENT
Start: 2022-05-25 | End: 2022-05-25

## 2022-05-25 RX ORDER — ETOMIDATE 2 MG/ML
20 INJECTION INTRAVENOUS ONCE
Status: COMPLETED | OUTPATIENT
Start: 2022-05-25 | End: 2022-05-25

## 2022-05-25 RX ORDER — PROPOFOL 10 MG/ML
5 VIAL (ML) INTRAVENOUS
Status: DISCONTINUED | OUTPATIENT
Start: 2022-05-25 | End: 2022-05-25 | Stop reason: HOSPADM

## 2022-05-25 RX ORDER — LORAZEPAM 2 MG/ML
2 INJECTION INTRAMUSCULAR
Status: COMPLETED | OUTPATIENT
Start: 2022-05-25 | End: 2022-05-25

## 2022-05-25 RX ORDER — PROPOFOL 10 MG/ML
INJECTION, EMULSION INTRAVENOUS
Status: COMPLETED
Start: 2022-05-25 | End: 2022-05-25

## 2022-05-25 RX ADMIN — LORAZEPAM 2 MG: 2 INJECTION INTRAMUSCULAR at 12:53

## 2022-05-25 RX ADMIN — PROPOFOL 5 MCG/KG/MIN: 10 INJECTION, EMULSION INTRAVENOUS at 13:30

## 2022-05-25 RX ADMIN — SODIUM CHLORIDE 1000 ML: 9 INJECTION, SOLUTION INTRAVENOUS at 11:47

## 2022-05-25 RX ADMIN — ETOMIDATE 20 MG: 2 INJECTION INTRAVENOUS at 13:20

## 2022-05-25 RX ADMIN — PROPOFOL 10 MCG/KG/MIN: 10 INJECTION, EMULSION INTRAVENOUS at 14:02

## 2022-05-25 RX ADMIN — ACETAMINOPHEN 975 MG: 650 SUPPOSITORY RECTAL at 12:17

## 2022-05-25 RX ADMIN — LORAZEPAM 2 MG: 2 INJECTION INTRAMUSCULAR at 11:44

## 2022-05-25 RX ADMIN — SUCCINYLCHOLINE CHLORIDE 120 MG: 20 INJECTION, SOLUTION INTRAMUSCULAR; INTRAVENOUS at 13:20

## 2022-05-25 RX ADMIN — SODIUM CHLORIDE 1000 ML: 9 INJECTION, SOLUTION INTRAVENOUS at 13:03

## 2022-05-25 RX ADMIN — IOPAMIDOL 100 ML: 755 INJECTION, SOLUTION INTRAVENOUS at 12:18

## 2022-05-25 RX ADMIN — POTASSIUM CHLORIDE, DEXTROSE MONOHYDRATE AND SODIUM CHLORIDE: 300; 5; 450 INJECTION, SOLUTION INTRAVENOUS at 12:56

## 2022-05-25 RX ADMIN — PROPOFOL 20 MCG/KG/MIN: 10 INJECTION, EMULSION INTRAVENOUS at 14:06

## 2022-05-25 RX ADMIN — POTASSIUM CHLORIDE 10 MEQ: 7.46 INJECTION, SOLUTION INTRAVENOUS at 14:00

## 2022-05-25 RX ADMIN — POTASSIUM CHLORIDE 10 MEQ: 7.46 INJECTION, SOLUTION INTRAVENOUS at 12:57

## 2022-05-25 RX ADMIN — DEXTROSE MONOHYDRATE 250 ML: 100 INJECTION, SOLUTION INTRAVENOUS at 13:00

## 2022-05-25 RX ADMIN — SODIUM CHLORIDE 1000 ML: 9 INJECTION, SOLUTION INTRAVENOUS at 12:51

## 2022-05-25 RX ADMIN — PIPERACILLIN AND TAZOBACTAM 3.38 G: 3; .375 INJECTION, POWDER, LYOPHILIZED, FOR SOLUTION INTRAVENOUS at 14:07

## 2022-05-25 RX ADMIN — VANCOMYCIN HYDROCHLORIDE 1000 MG: 1 INJECTION, POWDER, LYOPHILIZED, FOR SOLUTION INTRAVENOUS at 11:54

## 2022-05-25 NOTE — ED PROVIDER NOTES
EMERGENCY DEPARTMENT HISTORY AND PHYSICAL EXAM      Date: 5/25/2022  Patient Name: Adriano Patel    History of Presenting Illness     Chief Complaint   Patient presents with    Altered mental status       History Provided By: EMS    HPI: Adriano Patel, 27 y.o. female with a past medical history significant substance abuse presents to the ED with chief complaint of Altered mental status  . 2year-old female known history of substance abuse. 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 Patient was found down in a ditch today. Altered confused with neglect of the right side. Of facial droop very confused not following any commands. Upon arrival she is also found to have a fever. Pupils were dilated for EMS. No meds given in route. There are no other complaints, changes, or physical findings at this time.     PCP: J Luis Gunn MD    Current Facility-Administered Medications   Medication Dose Route Frequency Provider Last Rate Last Admin    potassium chloride 10 mEq in 100 ml IVPB  10 mEq IntraVENous ONCE Tyesha Quiñonez MD        potassium chloride 10 mEq in 100 ml IVPB  10 mEq IntraVENous ONCE Tyesha Quiñonez MD        potassium chloride 10 mEq in 100 ml IVPB  10 mEq IntraVENous ONCE Tyesha Quiñonez MD        potassium chloride 10 mEq in 100 ml IVPB  10 mEq IntraVENous ONCE Stacia Melara  mL/hr at 05/25/22 1257 10 mEq at 05/25/22 1257    dextrose 5% - 0.45% NaCl with KCl 40 mEq/L infusion   IntraVENous CONTINUOUS Stacia Melara  mL/hr at 05/25/22 1256 New Bag at 05/25/22 1256    propofoL (DIPRIVAN) 10 mg/mL injection             propofol (DIPRIVAN) 10 mg/mL infusion  5 mcg/kg/min IntraVENous TITRATE Tyesha Quiñonez MD        etomidate (AMIDATE) 2 mg/mL injection             succinylcholine (ANECTINE) 20 mg/mL injection              Current Outpatient Medications   Medication Sig Dispense Refill    ALPRAZolam (Xanax) 1 mg tablet Take 1 mg by mouth two (2) times a day.  cloNIDine HCL (CATAPRES) 0.1 mg tablet Take 0.1 mg by mouth three (3) times daily.  cephALEXin (Keflex) 250 mg capsule Take 1 Cap by mouth three (3) times daily. 21 Cap 0       Past History     Past Medical History:  Past Medical History:   Diagnosis Date    Aggressive outburst     Anxiety disorder     Bacteremia     Hepatitis C     IV drug abuse ([facility] [address]        Past Surgical History:  Past Surgical History:   Procedure Laterality Date    HX DILATION AND EVACUATION      HX HERNIA REPAIR         Family History:  Family History   Problem Relation Age of Onset   Donnelly Cancer Mother     COPD Mother     Cancer Father        Social History:  Social History     Tobacco Use    Smoking status: Current Every Day Smoker     Packs/day: 0.50     Types: Cigarettes    Smokeless tobacco: Never Used    Tobacco comment: pt refused   Substance Use Topics    Alcohol use: Yes     Comment: occassionally    Drug use: Yes     Types: Marijuana, Heroin     Comment: positive for amphetamines in ED       Allergies:  No Known Allergies      Review of Systems   Review of Systems   Unable to perform ROS: Acuity of condition       Physical Exam   Physical Exam  Vitals and nursing note reviewed. Exam conducted with a chaperone present. Constitutional:       General: She is in acute distress. Appearance: Normal appearance. She is ill-appearing, toxic-appearing and diaphoretic. HENT:      Head: Normocephalic and atraumatic. Nose: Nose normal.      Mouth/Throat:      Mouth: Mucous membranes are moist.      Pharynx: Oropharynx is clear. Eyes:      Extraocular Movements: Extraocular movements intact. Conjunctiva/sclera: Conjunctivae normal.      Pupils: Pupils are equal, round, and reactive to light. Comments: 6 mm bilaterally. Cardiovascular:      Rate and Rhythm: Regular rhythm. Tachycardia present.       Pulses: Normal pulses. Heart sounds: Normal heart sounds. Pulmonary:      Effort: Pulmonary effort is normal. No respiratory distress. Breath sounds: Normal breath sounds. Abdominal:      General: Abdomen is flat. Bowel sounds are normal. There is no distension. Palpations: Abdomen is soft. Tenderness: There is no abdominal tenderness. There is no guarding. Musculoskeletal:         General: No swelling, tenderness, deformity or signs of injury. Normal range of motion. Cervical back: Normal range of motion and neck supple. Right lower leg: No edema. Left lower leg: No edema. Comments: Scabbed lesions all over the lower extremity. Cold right foot no pulse palpable. Skin:     General: Skin is warm. Capillary Refill: Capillary refill takes less than 2 seconds. Findings: No lesion or rash. Neurological:      General: No focal deficit present. Mental Status: Mental status is at baseline. She is disoriented and confused. GCS: GCS eye subscore is 4. GCS verbal subscore is 2. GCS motor subscore is 5. Cranial Nerves: No cranial nerve deficit. Comments: deglectt right side. Psychiatric:         Mood and Affect: Mood normal.         Behavior: Behavior normal.         Thought Content:  Thought content normal.         Judgment: Judgment normal.         Diagnostic Study Results     Labs -     Recent Results (from the past 12 hour(s))   CBC WITH AUTOMATED DIFF    Collection Time: 05/25/22 11:39 AM   Result Value Ref Range    WBC 17.6 (H) 3.6 - 11.0 K/uL    RBC 4.96 3.80 - 5.20 M/uL    HGB 14.7 11.5 - 16.0 g/dL    HCT 41.7 35.0 - 47.0 %    MCV 84.1 80.0 - 99.0 FL    MCH 29.6 26.0 - 34.0 PG    MCHC 35.3 30.0 - 36.5 g/dL    RDW 12.5 11.5 - 14.5 %    PLATELET 20 (LL) 079 - 400 K/uL    NRBC 0.0 0.0  WBC    ABSOLUTE NRBC 0.00 0.00 - 0.01 K/uL    NEUTROPHILS 75 32 - 75 %    BAND NEUTROPHILS 14 (H) 0 - 6 %    LYMPHOCYTES 2 (L) 12 - 49 %    MONOCYTES 1 (L) 5 - 13 %    EOSINOPHILS 0 0 - 7 %    BASOPHILS 0 0 - 1 %    METAMYELOCYTES 7 (H) 0 %    MYELOCYTES 1 (H) 0 %    IMMATURE GRANULOCYTES 0 %    ABS. NEUTROPHILS 15.7 (H) 1.8 - 8.0 K/UL    ABS. LYMPHOCYTES 0.4 (L) 0.8 - 3.5 K/UL    ABS. MONOCYTES 0.2 0.0 - 1.0 K/UL    ABS. EOSINOPHILS 0.0 0.0 - 0.4 K/UL    ABS. BASOPHILS 0.0 0.0 - 0.1 K/UL    ABS. IMM. GRANS. 0.0 K/UL    DF Manual      RBC COMMENTS Cecy cells  1+        WBC COMMENTS Toxic Granulation     METABOLIC PANEL, COMPREHENSIVE    Collection Time: 05/25/22 11:39 AM   Result Value Ref Range    Sodium 135 (L) 136 - 145 mmol/L    Potassium 2.3 (LL) 3.5 - 5.1 mmol/L    Chloride 98 97 - 108 mmol/L    CO2 15 (LL) 21 - 32 mmol/L    Anion gap 22 (H) 5 - 15 mmol/L    Glucose 255 (H) 65 - 100 mg/dL    BUN 31 (H) 6 - 20 mg/dL    Creatinine 1.57 (H) 0.55 - 1.02 mg/dL    BUN/Creatinine ratio 20 12 - 20      GFR est AA 46 (L) >60 ml/min/1.73m2    GFR est non-AA 38 (L) >60 ml/min/1.73m2    Calcium 10.1 8.5 - 10.1 mg/dL    Bilirubin, total 1.6 (H) 0.2 - 1.0 mg/dL    AST (SGOT) 139 (H) 15 - 37 U/L    ALT (SGPT) 96 (H) 12 - 78 U/L    Alk.  phosphatase 328 (H) 45 - 117 U/L    Protein, total 7.0 6.4 - 8.2 g/dL    Albumin 2.6 (L) 3.5 - 5.0 g/dL    Globulin 4.4 (H) 2.0 - 4.0 g/dL    A-G Ratio 0.6 (L) 1.1 - 2.2     PROTHROMBIN TIME + INR    Collection Time: 05/25/22 11:39 AM   Result Value Ref Range    Prothrombin time 15.7 (H) 11.9 - 14.6 sec    INR 1.3 (H) 0.9 - 1.1     ETHYL ALCOHOL    Collection Time: 05/25/22 11:39 AM   Result Value Ref Range    ALCOHOL(ETHYL),SERUM <10 <10 mg/dL   HCG QL SERUM    Collection Time: 05/25/22 11:39 AM   Result Value Ref Range    HCG, Ql. Negative Negative     SALICYLATE    Collection Time: 05/25/22 11:39 AM   Result Value Ref Range    Salicylate level <1.0 (L) 2.8 - 20.0 mg/dL    Reported dose date Dose Dependent      Reported dose: Dose Dependent Units   ACETAMINOPHEN    Collection Time: 05/25/22 11:39 AM   Result Value Ref Range    Acetaminophen level <10 (L) 10 - 30 ug/mL    Reported dose date Dose Dependent      Reported dose: Dose Dependent Units   LACTIC ACID    Collection Time: 05/25/22 11:39 AM   Result Value Ref Range    Lactic acid 13.8 (HH) 0.4 - 2.0 mmol/L   CK    Collection Time: 05/25/22 11:50 AM   Result Value Ref Range     (H) 26 - 192 U/L   COVID-19 RAPID TEST    Collection Time: 05/25/22 11:58 AM   Result Value Ref Range    COVID-19 rapid test Not Detected Not Detected     BLOOD GAS, ARTERIAL    Collection Time: 05/25/22 12:45 PM   Result Value Ref Range    pH 7.50 (H) 7.35 - 7.45      PCO2 21 (L) 35 - 45 mmHg    PO2 126 (H) 75 - 100 mmHg    O2 SAT 99 >95 %    BICARBONATE 21 (L) 22 - 26 mmol/L    BASE DEFICIT 4.5 (H) 0 - 2 mmol/L    O2 METHOD Room air      FIO2 21.0 %    SITE Right Radial      JABARI'S TEST PASS     DUPLEX LOWER EXT ARTERY RIGHT    Collection Time: 05/25/22  1:15 PM   Result Value Ref Range    Right popliteal dist sys PSV 28.8 cm/s    Right CFA dist sys PSV 62.4 cm/s   URINALYSIS W/ RFLX MICROSCOPIC    Collection Time: 05/25/22  1:23 PM   Result Value Ref Range    Color Red      Appearance Turbid (A) Clear      Specific gravity >1.030 (H) 1.003 - 1.030    pH (UA) 7.0 5.0 - 8.0      Protein 100 (A) Negative mg/dL    Glucose 50 (A) Negative mg/dL    Ketone Negative Negative mg/dL    Bilirubin Negative Negative      Blood Large (A) Negative      Urobilinogen 0.1 0.1 - 1.0 EU/dL    Nitrites Negative Negative      Leukocyte Esterase Trace (A) Negative      WBC >100 (H) 0 - 4 /hpf    RBC >100 (H) 0 - 5 /hpf    Bacteria Negative Negative /hpf         Radiologic Studies -   DUPLEX LOWER EXT ARTERY RIGHT         CT CODE NEURO HEAD WO CONTRAST   Final Result   Small site subarachnoid and cortical/intraparenchymal hemorrhages   scattered throughout the brain. Findings concerning for embolic phenomenon. Is the patient on anticoagulation or is there a coagulopathy? Study findings called to Dr. Rossana Aguilar 12:39 pm 5/25/2022.       CTA CHEST W OR W WO CONT   Final Result   No CT evidence for PE. Mixed enhancement liver and spleen through imaged upper abdomen; consider   ultrasound correlation. Splenic infarct not excluded. CTA CODE NEURO HEAD AND NECK W CONT    (Results Pending)   XR CHEST PORT    (Results Pending)   XR KNEE RT 3 V    (Results Pending)     CT Results  (Last 48 hours)               05/25/22 1217  CT CODE NEURO HEAD WO CONTRAST Final result    Impression:  Small site subarachnoid and cortical/intraparenchymal hemorrhages   scattered throughout the brain. Findings concerning for embolic phenomenon. Is the patient on anticoagulation or is there a coagulopathy? Study findings called to Dr. Savannah Hebert 12:39 pm 5/25/2022. Narrative:  CT head       Axial images are reviewed along with reformatted sagittal/coronal images. Dose reduction: All CT scans at this facility are performed using dose reduction   optimization techniques as appropriate to a performed exam including the   following-   automated exposure control, adjustments of mA and/or Kv according to patient   size, or use of iterative reconstructive technique. Bone windows demonstrate normal aeration imaged sinuses and mastoid air cells. Review of intracranial content reveals small volume scattered   subarachnoid/sulcal and cortical intraparenchymal hemorrhages. These appear   randomly through cerebral cortex. No midline shift. No hydrocephalus. 05/25/22 1217  CTA [facility] CONT Final result    Impression:  No CT evidence for PE. Mixed enhancement liver and spleen through imaged upper abdomen; consider   ultrasound correlation. Splenic infarct not excluded. Narrative:  CTA chest.       Axial images are reviewed along with reformatted sagittal/coronal/MIP images. 100 mL Isovue 370 administered.     Dose reduction: All CT scans at this facility are performed using dose reduction   optimization techniques as appropriate to a performed exam including the   following-   automated exposure control, adjustments of mA and/or Kv according to patient   size, or use of iterative reconstructive technique. Lungs clear. No pleural effusion. Nonenhanced images demonstrate normal contrast opacification thoracic aorta and   great vessels traversing the superior mediastinum. Normal caliber pulmonary   arterial trunk. Normal contrast opacification pulmonary arterial trunk and its   branch vessels; no CT evidence for PE. No pericardial effusion. Nonspecific mixed enhancement imaged liver and spleen. Consider ultrasound   correlation. CXR Results  (Last 48 hours)    None          Medical Decision Making and ED Course   I am the first provider for this patient. I reviewed the vital signs, available nursing notes, past medical history, past surgical history, family history and social history. Vital Signs-Reviewed the patient's vital signs. Patient Vitals for the past 12 hrs:   Temp Pulse Resp BP SpO2   05/25/22 1334 (!) 102.7 °F (39.3 °C) (!) 140 30 (!) 146/90 100 %   05/25/22 1323 -- (!) 133 12 -- 100 %   05/25/22 1224 -- (!) 141 (!) 32 (!) 135/106 99 %   05/25/22 1157 (!) 102.2 °F (39 °C) -- -- -- --   05/25/22 1136 -- (!) 151 (!) 35 (!) 138/94 98 %       EKG interpretation:         Records Reviewed: Previous Hospital chart. EMS run report      ED Course:   Initial assessment performed. The patients presenting problems have been discussed, and they are in agreement with the care plan formulated and outlined with them. I have encouraged them to ask questions as they arise throughout their visit. Orders Placed This Encounter    CULTURE, BLOOD, PAIRED     Standing Status:   Standing     Number of Occurrences:   1    COVID-19 RAPID TEST     Standing Status:   Standing     Number of Occurrences:   1     Order Specific Question:   Is this test for diagnosis or screening?      Answer:   Diagnosis of ill patient     Order Specific Question:   Symptomatic for COVID-19 as defined by CDC? Answer:   Yes     Order Specific Question:   Date of Symptom Onset     Answer:   5/24/2022     Order Specific Question:   Hospitalized for COVID-19? Answer:   No     Order Specific Question:   Admitted to ICU for COVID-19? Answer:   No     Order Specific Question:   Employed in healthcare setting? Answer:   No     Order Specific Question:   Resident in a congregate (group) care setting? Answer:   No     Order Specific Question:   Pregnant? Answer:   No     Order Specific Question:   Previously tested for COVID-19? Answer: Yes    CT CODE NEURO HEAD WO CONTRAST     Standing Status:   Standing     Number of Occurrences:   1     Order Specific Question:   Reason for Exam     Answer:   Code Stroke     Order Specific Question:   Transport     Answer:   Stretcher [5]     Order Specific Question:   Is Patient Pregnant? Answer:   Unknown     Order Specific Question:   Decision Support Exception     Answer:   Emergency Medical Condition (MA) [1]    CTA CODE NEURO HEAD AND NECK W CONT     Standing Status:   Standing     Number of Occurrences:   1     Order Specific Question:   Does patient have history of Renal Disease? Answer:   No     Order Specific Question:   Reason for Exam     Answer:   Code Stroke     Order Specific Question:   Transport     Answer:   Stretcher [5]     Order Specific Question:   Is Patient Pregnant? Answer:   Unknown     Order Specific Question:   Decision Support Exception     Answer:   Emergency Medical Condition (MA) [1]    XR CHEST PORT     Standing Status:   Standing     Number of Occurrences:   1     Order Specific Question:   Reason for Exam     Answer:   ams     Order Specific Question:   Is Patient Pregnant?      Answer:   Unknown    CTA CHEST W OR W WO CONT     Standing Status:   Standing     Number of Occurrences:   1     Order Specific Question:   Transport Answer:   Sarthak Sanchez [5]     Order Specific Question:   Is Patient Pregnant? Answer:   Unknown     Order Specific Question:   Reason for Exam     Answer:   tachypneic, ams, tachycardic, labored breathing     Order Specific Question:   Does patient have history of Renal Disease? Answer:   No     Order Specific Question:   Decision Support Exception     Answer:   Emergency Medical Condition (MA) [1]    XR KNEE RT 3 V     Standing Status:   Standing     Number of Occurrences:   1     Order Specific Question:   Transport     Answer:   Stretcher [5]     Order Specific Question:   Reason for Exam     Answer:   uri butler     Order Specific Question:   Is Patient Pregnant?      Answer:   No    CBC WITH AUTOMATED DIFF     Standing Status:   Standing     Number of Occurrences:   1    METABOLIC PANEL, COMPREHENSIVE     Standing Status:   Standing     Number of Occurrences:   1    PROTHROMBIN TIME + INR     Standing Status:   Standing     Number of Occurrences:   1    DRUG SCREEN, URINE     Standing Status:   Standing     Number of Occurrences:   1    ETHYL ALCOHOL     Standing Status:   Standing     Number of Occurrences:   1    HCG QL SERUM     Standing Status:   Standing     Number of Occurrences:   1    URINALYSIS W/ RFLX MICROSCOPIC     Standing Status:   Standing     Number of Occurrences:   1    SALICYLATE     Standing Status:   Standing     Number of Occurrences:   1    ACETAMINOPHEN     Standing Status:   Standing     Number of Occurrences:   1    LACTIC ACID     Standing Status:   Standing     Number of Occurrences:   1    CK     Standing Status:   Standing     Number of Occurrences:   1    BLOOD GAS, ARTERIAL     Standing Status:   Standing     Number of Occurrences:   1    HEMOGLOBIN A1C     Standing Status:   Standing     Number of Occurrences:   1    PHOSPHORUS     Standing Status:   Standing     Number of Occurrences:   1    FIBRINOGEN     Standing Status:   Standing     Number of Occurrences:   1    PROTHROMBIN TIME + INR     Standing Status:   Standing     Number of Occurrences:   1    PTT     Standing Status:   Standing     Number of Occurrences:   1    D DIMER     Standing Status:   Standing     Number of Occurrences:   1    URINE MICROSCOPIC     Standing Status:   Standing     Number of Occurrences:   1    CARDIAC MONITOR - ED ONLY     Standing Status:   Standing     Number of Occurrences:   1     Order Specific Question:   Type: Answer:   Bedside    PULSE OXIMETRY CONTINUOUS     Standing Status:   Standing     Number of Occurrences:   1    VITAL SIGNS     Standing Status:   Standing     Number of Occurrences:   1    INITIATE NURSING DYSPHAGIA SCREENING     Standing Status:   Standing     Number of Occurrences:   1    MEASURE HEIGHT     Standing Status:   Standing     Number of Occurrences:   1    WEIGH PATIENT     Standing Status:   Standing     Number of Occurrences:   1    NEUROLOGIC STATUS ASSESSMENT     Standing Status:   Standing     Number of Occurrences:   1    NIH STROKE SCALE ASSESSMENT     Standing Status:   Standing     Number of Occurrences:   1    ELEVATE HEAD OF BED     Standing Status:   Standing     Number of Occurrences:   1     Order Specific Question:   Degrees of Elevation     Answer:   30    OXYGEN CANNULA     Titrate up to 4l/minute to maintain oxygen saturations at 90% or greater     Standing Status:   Standing     Number of Occurrences:   1     Order Specific Question:   Liters per minute:      Answer:   2     Order Specific Question:   Indications for O2 therapy     Answer:   OTHER    POC GLUCOSE     Standing Status:   Standing     Number of Occurrences:   1    POC GLUCOSE     Standing Status:   Standing     Number of Occurrences:   1    POC GLUCOSE     Standing Status:   Standing     Number of Occurrences:   1    RT--MECHANICAL VENTILATOR     Standing Status:   Standing     Number of Occurrences:   1     Order Specific Question:   Mode: Answer:   ASSIST CONTROL     Order Specific Question:   PEEP     Answer:   5.0 CM/H2O     Order Specific Question:   Rate: Answer:   15     Order Specific Question:   Tidal Volume     Answer:   350     Order Specific Question:   FIO2     Answer:   50%    EKG, 12 LEAD, INITIAL     Standing Status:   Standing     Number of Occurrences:   1     Order Specific Question:   Reason for Exam:     Answer:   CVA    SALINE LOCK IV ONE TIME STAT     Standing Status:   Standing     Number of Occurrences:   1    LORazepam (ATIVAN) injection 2 mg    sodium chloride 0.9 % bolus infusion 1,000 mL    vancomycin (VANCOCIN) 1,000 mg in 0.9% sodium chloride 250 mL ([id number])     Order Specific Question:   Antibiotic Indications     Answer:   Sepsis of Unknown Etiology    acetaminophen (TYLENOL) suppository 975 mg    iopamidoL (ISOVUE-370) 76 % injection 100 mL    DISCONTD: sodium chloride 0.9 % bolus infusion 2,000 mL    FOLLOWED BY Linked Order Group     sodium chloride 0.9 % bolus infusion 1,000 mL     sodium chloride 0.9 % bolus infusion 1,000 mL    LORazepam (ATIVAN) injection 2 mg    potassium chloride 10 mEq in 100 ml IVPB    potassium chloride 10 mEq in 100 ml IVPB    potassium chloride 10 mEq in 100 ml IVPB    potassium chloride 10 mEq in 100 ml IVPB    dextrose 5% - 0.45% NaCl with KCl 40 mEq/L infusion    DISCONTD: insulin regular (NOVOLIN R, HUMULIN R) 100 Units in 0.9% sodium chloride 100 mL infusion    DISCONTD: glucose chewable tablet 16 g    dextrose 10% infusion 250 mL    DISCONTD: glucagon (GLUCAGEN) injection 1 mg    etomidate (AMIDATE) 2 mg/mL injection 20 mg    succinylcholine chloride 200 mg/10 ml syringe 120 mg    propofoL (DIPRIVAN) 10 mg/mL injection     Mario Landrum: cabinet override    propofol (DIPRIVAN) 10 mg/mL infusion     Order Specific Question:   Titrate Infusion? Answer:   Yes     Order Specific Question:   Initial Infusion Rate:      Answer:   20 mcg/kg/min     Order Specific Question:   Titrate Every 5 Minutes in Increments of: Answer:   5 mcg/kg/min     Order Specific Question:   Goal RASS:     Answer:   0 to -1     Order Specific Question:   Contact Physician for: Answer:   HR <50 bpm and/or SBP <90 mmHg     Order Specific Question:   Hold For:     Answer:   Spontaneous Breathing Trial     Order Specific Question:   Hold For:     Answer:   Oversedation    etomidate (AMIDATE) 2 mg/mL injection     Velma Horacio: cabinet override    succinylcholine (ANECTINE) 20 mg/mL injection     Velma Horacio: cabinet override                 Provider Notes (Medical Decision Making):   2year-old female presents with altered mental status found down in a ditch. With right-sided neglect. Patient is a stroke alert level 2 last known well is unknown. CT does show micro emboli possibly bleeding. CT of the chest lower part does show a splenic infarct. Some surrounding swelling although no evidence of trauma externally. Also a pulseless right foot confirmed with Doppler. Platelet count of 20 concern for DIC. With a history of endocarditis fever concern about recurrence of septic emboli. With a trauma history will transfer to [facility] also with the concern for neurosurgery evaluation. Case with ER physician who will accept. Dr Sybil Landau        ED Course as of 05/25/22 1350   Wed May 25, 2022   1248 EKG at 1237 sinus tachycardia rate of 137. No ST changes. Reason rule out dysrhythmia. Interpreted by ER physician.  [HP]      ED Course User Index  [HP] Jason Mayers MD         Transferred to Another Facility    Procedures           CRITICAL CARE NOTE :  1:54 PM  Amount of Critical Care Time: 95 minutes    IMPENDING DETERIORATION -Airway, Respiratory, Cardiovascular and CNS  ASSOCIATED RISK FACTORS - Hypotension, Bleeding, Trauma, Dysrhythmia and Metabolic changes  MANAGEMENT- Bedside Assessment, Supervision of Care and Transfer  INTERPRETATION - Xrays, CT Scan, Blood Gases, ECG, Blood Pressure and Cardiac Output Measures   INTERVENTIONS - hemodynamic mngmt  CASE REVIEW - Hospitalist/Intensivist, Medical Sub-Specialist and Nursing  TREATMENT RESPONSE -Stable  PERFORMED BY - Self    NOTES   :  I have spent critical care time involved in lab review, consultations with specialist, family decision- making, bedside attention and documentation. This time excludes time spent in any separate billed procedures. During this entire length of time I was immediately available to the patient . Tang Velásquez MD          INTUBATION:  Patient intubated for airway protection. RSI utilized 20 mg of etomidate 150 mg of succinylcholine. Preoxygenated. 7.5 ET tube easily passed through the cords with direct visualization using the glide scope. Bilateral breath sounds present. Placed on the vent. ABG ordered post intubation. Chest x-ray as well. Patient tolerated procedure well lowest O2 sats 100%. Trauma evaluation according to ATLS protocols. Disposition       Emergency Department Disposition:  Transferred to Another Facility      Diagnosis     Clinical Impression:   1. Infective endocarditis, due to unspecified organism, unspecified chronicity    2. Sepsis, due to unspecified organism, unspecified whether acute organ dysfunction present ([address]    3. Respiratory distress    4. Thrombocytopenia ([address]    5. Hypokalemia    6. Respiratory alkalosis    7. Cerebrovascular accident (CVA) due to embolism of precerebral artery ([address]    8. Arterial occlusion        Attestations:    Tang Velásquez MD    Please note that this dictation was completed with [employer], the computer voice recognition software. Quite often unanticipated grammatical, syntax, homophones, and other interpretive errors are inadvertently transcribed by the computer software. Please disregard these errors. Please excuse any errors that have escaped final proofreading. Thank you.

## 2022-05-25 NOTE — ED TRIAGE NOTES
Pt found in ditch, hx of drug use, possible stroke hx, pt lkw yesterday, pt awake but not talking or following commands, jerky movements, multiple abscesses.  Moving all extremities but not responding on right sided of face

## 2022-05-26 LAB
EST. AVERAGE GLUCOSE BLD GHB EST-MCNC: 103 MG/DL
HBA1C MFR BLD: 5.2 % (ref 4–5.6)
RIGHT CFA DIST SYS PSV: 62.4 CM/S
RIGHT POPLITEAL DIST SYS PSV: 28.8 CM/S

## 2022-05-26 PROCEDURE — 93926 LOWER EXTREMITY STUDY: CPT | Performed by: SURGERY

## 2022-05-28 LAB
BACTERIA SPEC CULT: ABNORMAL
SPECIAL REQUESTS,SREQ: ABNORMAL

## 2023-07-26 NOTE — PROGRESS NOTES
[address] [facility] Adult  Hospitalist Group                                                                                          Hospitalist Progress Note  Zofia Holloway DO  Answering service: [phone] OR [phone] from in house phone        Date of Service:  3/12/2021  NAME:  Puja Casiano  :  1990  MRN:  [id number]      Please note that this dictation was completed with [employer], the computer voice recognition software. Quite often unanticipated grammatical, syntax, homophones, and other interpretive errors are inadvertently transcribed by the computer software. Please disregard these errors. Please excuse any errors that have escaped final proofreading. Admission Summary:   . Patient with altered mental status not willing and unable to provide information. Mother reports she is adult but lives at her house. Not sure when she used any type of IV drugs last time. Possibly yesterday. Reports she has been dealing with IV drug abuse for many years. Apparently after an . Mother reports she has been multiple times in, due to narcotic overdose, reversed by nasal Narcan. She also has history of amphetamine use, unknown last use. Last 4 days unable to ambulate. She has been suffering from severe myalgia and fever occasional cough, negative for Covid no exposure. Mother reports she was negative as well. Patient and mother report, never had similar symptoms in the past.  She takes Suboxone and Xanax as needed for anxiety. Not have any other prescriptions.       Interval history / Subjective: Follow up sepsis. Patient seen and examined. No acute events. Haldol works for agitation but would prefer oral dose. Requesting home xanax. Discussed end date of antibiotics.       Assessment & Plan:     Severe Sepsis due to MSSA bacteremia POA in setting of IVDU  Mitral valve Infective endocarditis  Septic embolic  -ID following  -Continues cefazolin x 6 week from previous cleared blood culture, tentative date 4/18/2021  --Recent blood culture from 3/7/2021 NGTD  -MRI brain with multiple punctate foci restricted diffusion in T2 hyperintensity, suggesting emboli  --holding lovenox due to concerns for hemorrhagic conversion     Rhinovirus:   -detected on viral PCR, droplet precautions    Altered mental status, multifactorial now alert and awake  -Encephalopathy from sepsis and withdrawal from drugs, heroin withdrawal and benzo withdrawal, and embolic events  -avoid benzodiazepines due to overuse and previous over sedation      Anemia:   -Etiology unclear, monitor closely, patient defers blood transfusion at this time    Polysubstance with possible benzo/heroin  heroin withdrawal and/or amphetamine overdose  -continue home suboxone  -hold xanax  -haldol and atarax for agitation  -discussed initiating SSRI for anxiety and patient declined     Hypokalemia  -replete as needed    Thrombocytopenia now with thrombocytosis  -Monitor, likely reactive    Diarrhea:   -work up negative    Hypernatremia   -resolved    Body mass index is 17.25 kg/m².    History of hepatitis C-negative viral load HIV NR, VL pending      Patient unable to have visitors due to concerns for bringing in controlled substances    Code status: FULL  DVT prophylaxis:SCD    Care Plan discussed with: Patient/Family  Anticipated Disposition: Home w/Family  Anticipated Discharge: Greater than 48 hours Antibiotics until 4/18/2021     Hospital Problems  Never Reviewed          Codes Class Noted POA    Cerebrovascular accident (CVA) due to embolism of precerebral artery (HCC) ICD-10-CM: I63.10  ICD-9-CM: 434.11  3/6/2021 Unknown        Sepsis (HCC) ICD-10-CM: A41.9  ICD-9-CM: 038.9, 995.91  2/28/2021 Unknown              Review of Systems:     Negative unless stated above    Vital Signs:    Last 24hrs VS reviewed since prior progress note. Most recent are:  Visit Vitals  /68   Pulse (!) 105   Temp 98.7 °F (37.1 °C)   Resp 18   Ht 5' 6\"  (1.676 m)   Wt 48.5 kg (106 lb 14.4 oz)   SpO2 97%   BMI 17.25 kg/m²         Intake/Output Summary (Last 24 hours) at 3/12/2021 1508  Last data filed at 3/12/2021 0400  Gross per 24 hour   Intake 20 ml   Output —   Net 20 ml        Physical Examination:     I had a face to face encounter with this patient and independently examined them on 03/12/21 as outlined below:        Constitutional:  AAOX3, awake, thin appearing    ENT:  Oral mucosa moist, oropharynx benign.    Resp:  CTA bilaterally. No wheezing/rhonchi/rales. No accessory muscle use   CV:  Regular rhythm, normal rate, 2 /6 systolic murmur heard at the right upper sternal border    GI:  Soft, non distended, non tender. normoactive bowel sounds, no hepatosplenomegaly     Musculoskeletal:  Moving around in the bed    Neurologic: Alert and oriented             Data Review:    Review and/or order of clinical lab test      Labs:     Recent Labs     03/12/21  0805 03/12/21  0454 03/11/21  0355   WBC  --  8.5 7.9   HGB 7.0* 6.0* 7.6*   HCT 22.0* 18.7* 23.8*   PLT  --  580* 493*     Recent Labs     03/12/21  0454 03/11/21  0355   * 132*   K 4.0 4.1    99   CO2 29 30   BUN 10 9   CREA 0.49* 0.48*   GLU 97 105*   CA 9.1 9.2     Recent Labs     03/12/21  0454   ALT 24   *   TBILI 0.3   TP 8.1   ALB 2.2*   GLOB 5.9*     No results for input(s): INR, PTP, APTT, INREXT, INREXT in the last 72 hours.   No results for input(s): FE, TIBC, PSAT, FERR in the last 72 hours.   No results found for: FOL, RBCF   No results for input(s): PH, PCO2, PO2 in the last 72 hours.  No results for input(s): CPK, CKNDX, TROIQ in the last 72 hours.    No lab exists for component: CPKMB  Lab Results   Component Value Date/Time    Cholesterol, total 74 03/07/2021 04:42 AM    HDL Cholesterol 21 03/07/2021 04:42 AM    LDL, calculated 38.6 03/07/2021 04:42 AM    Triglyceride 72 03/07/2021 04:42 AM    CHOL/HDL Ratio 3.5 03/07/2021 04:42 AM     Lab Results   Component Value  Date/Time    Glucose (POC) 132 (H) 03/03/2021 05:27 AM    Glucose (POC) 118 (H) 03/03/2021 12:18 AM     Lab Results   Component Value Date/Time    Color DARK YELLOW 02/28/2021 06:12 PM    Appearance CLEAR 02/28/2021 06:12 PM    Specific gravity 1.020 02/28/2021 06:12 PM    Specific gravity 1.016 02/28/2021 06:00 AM    pH (UA) 6.5 02/28/2021 06:12 PM    Protein 100 (A) 02/28/2021 06:12 PM    Glucose Negative 02/28/2021 06:12 PM    Ketone 15 (A) 02/28/2021 06:12 PM    Bilirubin Negative 02/28/2021 06:00 AM    Urobilinogen 2.0 (H) 02/28/2021 06:12 PM    Nitrites Positive (A) 02/28/2021 06:12 PM    Leukocyte Esterase MODERATE (A) 02/28/2021 06:12 PM    Epithelial cells FEW 02/28/2021 06:12 PM    Bacteria 1+ (A) 02/28/2021 06:12 PM    WBC 20-50 02/28/2021 06:12 PM    RBC >100 (H) 02/28/2021 06:12 PM         Medications Reviewed:     Current Facility-Administered Medications   Medication Dose Route Frequency    0.9% sodium chloride infusion 250 mL  250 mL IntraVENous PRN    haloperidoL (HALDOL) tablet 5 mg  5 mg Oral Q6H PRN    acetaminophen (TYLENOL) tablet 650 mg  650 mg Oral Q4H PRN    Or    acetaminophen (TYLENOL) suppository 650 mg  650 mg Rectal Q4H PRN    ibuprofen (MOTRIN) tablet 600 mg  600 mg Oral Q6H PRN    multivitamin, tx-iron-ca-min (THERA-M w/ IRON) tablet 1 Tab  1 Tab Oral DAILY    ceFAZolin (ANCEF) 2 g in sterile water (preservative free) 20 mL IV syringe  2 g IntraVENous Q8H    melatonin tablet 3 mg  3 mg Oral QHS PRN    nicotine (NICODERM CQ) 21 mg/24 hr patch 1 Patch  1 Patch TransDERmal DAILY    influenza vaccine 2020-21 (6 mos+)(PF) (FLUARIX/FLULAVAL/FLUZONE QUAD) injection 0.5 mL  0.5 mL IntraMUSCular PRIOR TO DISCHARGE    buprenorphine-naloxone (SUBOXONE) 8-2mg SL tablet  1 Tab SubLINGual BID    sodium chloride (NS) flush 5-40 mL  5-40 mL IntraVENous Q8H    sodium chloride (NS) flush 5-40 mL  5-40 mL IntraVENous PRN    polyethylene glycol (MIRALAX) packet 17 g  17 g Oral DAILY PRN    promethazine (PHENERGAN) tablet 12.5 mg  12.5 mg Oral Q6H PRN    Or    ondansetron (ZOFRAN) injection 4 mg  4 mg IntraVENous Q6H PRN     ______________________________________________________________________  EXPECTED LENGTH OF STAY: 4d 19h  ACTUAL LENGTH OF STAY:          Anabelle Camp [address], DO FAMILY HISTORY:  Sibling  Still living? Unknown  Family history of cystic fibrosis, Age at diagnosis: Age Unknown

## 2023-12-01 NOTE — PROGRESS NOTES
[facility] [facility] Adult  Hospitalist Group                                                                                          Hospitalist Progress Note  [address] [facility],   Answering service: [phone] OR [phone] from in house phone        Date of Service:  3/13/2021  NAME:  Estephania Diaz  :  1990  MRN:  [id number]      Please note that this dictation was completed with [employer], the computer voice recognition software. Quite often unanticipated grammatical, syntax, homophones, and other interpretive errors are inadvertently transcribed by the computer software. Please disregard these errors. Please excuse any errors that have escaped final proofreading. Admission Summary:   . Patient with altered mental status not willing and unable to provide information. Mother reports she is adult but lives at her house. Not sure when she used any type of IV drugs last time. Possibly yesterday. Reports she has been dealing with IV drug abuse for many years. Apparently after an . Mother reports she has been multiple times in, due to narcotic overdose, reversed by nasal Narcan. She also has history of amphetamine use, unknown last use. Last 4 days unable to ambulate. She has been suffering from severe myalgia and fever occasional cough, negative for Covid no exposure. Mother reports she was negative as well. Patient and mother report, never had similar symptoms in the past.  She takes Suboxone and Xanax as needed for anxiety. Not have any other prescriptions.       Interval history / Subjective: Follow up sepsis. Patient seen and examined. Anxious this AM. Awaiting morning suboxone. Reviewed diagnoses with patient.       Assessment & Plan:     Severe Sepsis due to MSSA bacteremia POA in setting of IVDU  Mitral valve Infective endocarditis  Cerebral Septic embolic  -ID following  -Continues cefazolin x 6 week from previous cleared blood culture, tentative date 2021  --Recent Addended by: TEDDY MINA on: 12/1/2023 10:54 AM     Modules accepted: Orders     blood culture from 3/7/2021 NGTD  -MRI brain with multiple punctate foci restricted diffusion in T2 hyperintensity, suggesting emboli  --holding lovenox due to concerns for hemorrhagic conversion     Rhinovirus:   -detected on viral PCR, droplet precautions    Altered mental status, multifactorial now alert and awake  -Encephalopathy from sepsis and withdrawal from drugs, heroin withdrawal and benzo withdrawal, and embolic events  -avoid benzodiazepines due to overuse/previous over sedation and addictive effects       Anemia:   -Etiology unclear, monitor closely, patient defers blood transfusion at this time    Polysubstance with possible benzo/heroin  heroin withdrawal and/or amphetamine overdose  -continue home suboxone  -hold xanax  -haldol and atarax for agitation  -discussed initiating SSRI for anxiety and patient declined  -psychiatry consult this coming week      Hypokalemia  -replete as needed    Thrombocytopenia now with thrombocytosis  -Monitor, likely reactive    Diarrhea:   -work up negative    Hypernatremia   -resolved    Body mass index is 16.95 kg/m². History of hepatitis C-negative viral load HIV NR, VL pending      Patient unable to have visitors due to concerns for bringing in controlled substances    Code status: FULL  DVT prophylaxis:SCD/ambulatory    Care Plan discussed with: Patient/Family  Anticipated Disposition: Home w/Family  Anticipated Discharge: Greater than 48 hours Antibiotics until 4/18/2021     Hospital Problems  Never Reviewed          Codes Class Noted POA    Cerebrovascular accident (CVA) due to embolism of precerebral artery ([facility] ICD-10-CM: I63.10  ICD-9-CM: 434.11  3/6/2021 Unknown        Sepsis ([facility] ICD-10-CM: A41.9  ICD-9-CM: 038.9, 995.91  2/28/2021 Unknown              Review of Systems:     Negative unless stated above    Vital Signs:    Last 24hrs VS reviewed since prior progress note.  Most recent are:  Visit Vitals  /72 (BP 1 Location: Right upper arm, BP Patient Position: Supine)   Pulse (!) 113   Temp 99.2 °F (37.3 °C)   Resp 20   Ht 5' 6\" (1.676 m)   Wt 47.6 kg (105 lb)   SpO2 97%   BMI 16.95 kg/m²       No intake or output data in the 24 hours ending 03/13/21 0914     Physical Examination:     I had a face to face encounter with this patient and independently examined them on 03/13/21 as outlined below:        Constitutional:  AAOX3, awake, thin appearing    ENT:  Oral mucosa moist, oropharynx benign. Resp:  CTA bilaterally. No wheezing/rhonchi/rales. No accessory muscle use   CV:  tachycardic, 2 /6 systolic murmur heard at the right upper sternal border    GI:  Soft, non distended, non tender. normoactive bowel sounds, no hepatosplenomegaly     Musculoskeletal:  Moving around in the bed    Neurologic: Alert and oriented             Data Review:    Review and/or order of clinical lab test      Labs:     Recent Labs     03/12/21  0805 03/12/21  0454 03/11/21  0355   WBC  --  8.5 7.9   HGB 7.0* 6.0* 7.6*   HCT 22.0* 18.7* 23.8*   PLT  --  580* 493*     Recent Labs     03/12/21  0454 03/11/21  0355   * 132*   K 4.0 4.1    99   CO2 29 30   BUN 10 9   CREA 0.49* 0.48*   GLU 97 105*   CA 9.1 9.2     Recent Labs     03/12/21  0454   ALT 24   *   TBILI 0.3   TP 8.1   ALB 2.2*   GLOB 5.9*     No results for input(s): INR, PTP, APTT, INREXT, INREXT in the last 72 hours. No results for input(s): FE, TIBC, PSAT, FERR in the last 72 hours. No results found for: FOL, RBCF   No results for input(s): PH, PCO2, PO2 in the last 72 hours. No results for input(s): CPK, CKNDX, TROIQ in the last 72 hours.     No lab exists for component: CPKMB  Lab Results   Component Value Date/Time    Cholesterol, total 74 03/07/2021 04:42 AM    HDL Cholesterol 21 03/07/2021 04:42 AM    LDL, calculated 38.6 03/07/2021 04:42 AM    Triglyceride 72 03/07/2021 04:42 AM    CHOL/HDL Ratio 3.5 03/07/2021 04:42 AM     Lab Results   Component Value Date/Time    Glucose (POC) 132 (H) 03/03/2021 05:27 AM    Glucose (POC) 118 (H) 03/03/2021 12:18 AM     Lab Results   Component Value Date/Time    Color DARK YELLOW 02/28/2021 06:12 PM    Appearance CLEAR 02/28/2021 06:12 PM    Specific gravity 1.020 02/28/2021 06:12 PM    Specific gravity 1.016 02/28/2021 06:00 AM    pH (UA) 6.5 02/28/2021 06:12 PM    Protein 100 (A) 02/28/2021 06:12 PM    Glucose Negative 02/28/2021 06:12 PM    Ketone 15 (A) 02/28/2021 06:12 PM    Bilirubin Negative 02/28/2021 06:00 AM    Urobilinogen 2.0 (H) 02/28/2021 06:12 PM    Nitrites Positive (A) 02/28/2021 06:12 PM    Leukocyte Esterase MODERATE (A) 02/28/2021 06:12 PM    Epithelial cells FEW 02/28/2021 06:12 PM    Bacteria 1+ (A) 02/28/2021 06:12 PM    WBC 20-50 02/28/2021 06:12 PM    RBC >100 (H) 02/28/2021 06:12 PM         Medications Reviewed:     Current Facility-Administered Medications   Medication Dose Route Frequency    haloperidoL (HALDOL) tablet 5 mg  5 mg Oral Q6H PRN    hydrOXYzine HCL (ATARAX) tablet 50 mg  50 mg Oral TID PRN    acetaminophen (TYLENOL) tablet 650 mg  650 mg Oral Q4H PRN    Or    acetaminophen (TYLENOL) suppository 650 mg  650 mg Rectal Q4H PRN    ibuprofen (MOTRIN) tablet 600 mg  600 mg Oral Q6H PRN    multivitamin, tx-iron-ca-min (THERA-M w/ IRON) tablet 1 Tab  1 Tab Oral DAILY    ceFAZolin (ANCEF) 2 g in sterile water (preservative free) 20 mL IV syringe  2 g IntraVENous Q8H    melatonin tablet 3 mg  3 mg Oral QHS PRN    nicotine (NICODERM CQ) 21 mg/24 hr patch 1 Patch  1 Patch TransDERmal DAILY    influenza vaccine 2020-21 (6 mos+)(PF) (FLUARIX/FLULAVAL/FLUZONE QUAD) injection 0.5 mL  0.5 mL IntraMUSCular PRIOR TO DISCHARGE    buprenorphine-naloxone (SUBOXONE) 8-2mg SL tablet  1 Tab SubLINGual BID    sodium chloride (NS) flush 5-40 mL  5-40 mL IntraVENous Q8H    sodium chloride (NS) flush 5-40 mL  5-40 mL IntraVENous PRN    polyethylene glycol (MIRALAX) packet 17 g  17 g Oral DAILY PRN    promethazine (PHENERGAN) tablet 12.5 mg  12.5 mg Oral Q6H PRN    Or    ondansetron (ZOFRAN) injection 4 mg  4 mg IntraVENous Q6H PRN     ______________________________________________________________________  EXPECTED LENGTH OF STAY: 4d 19h  ACTUAL LENGTH OF STAY:          [address] [facility],